# Patient Record
Sex: FEMALE | Race: WHITE | Employment: OTHER | URBAN - NONMETROPOLITAN AREA
[De-identification: names, ages, dates, MRNs, and addresses within clinical notes are randomized per-mention and may not be internally consistent; named-entity substitution may affect disease eponyms.]

---

## 2017-01-25 DIAGNOSIS — I10 ESSENTIAL HYPERTENSION: ICD-10-CM

## 2017-01-25 RX ORDER — METOPROLOL SUCCINATE 100 MG/1
TABLET, EXTENDED RELEASE ORAL
Qty: 90 TABLET | Refills: 3 | Status: ON HOLD | OUTPATIENT
Start: 2017-01-25 | End: 2019-05-28 | Stop reason: HOSPADM

## 2017-02-07 ENCOUNTER — TELEPHONE (OUTPATIENT)
Dept: NEUROLOGY | Age: 82
End: 2017-02-07

## 2017-02-09 ENCOUNTER — OFFICE VISIT (OUTPATIENT)
Dept: GASTROENTEROLOGY | Facility: CLINIC | Age: 82
End: 2017-02-09

## 2017-02-09 VITALS
DIASTOLIC BLOOD PRESSURE: 62 MMHG | OXYGEN SATURATION: 99 % | WEIGHT: 92.6 LBS | SYSTOLIC BLOOD PRESSURE: 122 MMHG | BODY MASS INDEX: 19.44 KG/M2 | HEART RATE: 84 BPM | HEIGHT: 58 IN | TEMPERATURE: 96.1 F

## 2017-02-09 DIAGNOSIS — K63.5 COLON POLYPS: ICD-10-CM

## 2017-02-09 DIAGNOSIS — D64.9 ANEMIA, UNSPECIFIED TYPE: Primary | ICD-10-CM

## 2017-02-09 PROCEDURE — 99212 OFFICE O/P EST SF 10 MIN: CPT | Performed by: NURSE PRACTITIONER

## 2017-02-09 RX ORDER — LEVOTHYROXINE SODIUM 0.1 MG/1
100 TABLET ORAL DAILY
COMMUNITY
Start: 2017-01-18

## 2017-02-09 RX ORDER — AMLODIPINE BESYLATE 10 MG/1
10 TABLET ORAL DAILY
COMMUNITY
Start: 2016-11-29

## 2017-02-09 RX ORDER — DONEPEZIL HYDROCHLORIDE 5 MG/1
5 TABLET, FILM COATED ORAL NIGHTLY
COMMUNITY
Start: 2017-01-29

## 2017-02-09 RX ORDER — METOPROLOL SUCCINATE 100 MG/1
100 TABLET, EXTENDED RELEASE ORAL DAILY
COMMUNITY
Start: 2017-01-25

## 2017-02-09 RX ORDER — CLOPIDOGREL BISULFATE 75 MG/1
75 TABLET ORAL DAILY
COMMUNITY
Start: 2016-11-17

## 2017-02-09 RX ORDER — LEVALBUTEROL TARTRATE 45 MCG
1 HFA AEROSOL WITH ADAPTER (GRAM) INHALATION EVERY 4 HOURS PRN
COMMUNITY
Start: 2017-01-25 | End: 2018-11-30

## 2017-02-09 RX ORDER — LOSARTAN POTASSIUM 50 MG/1
50 TABLET ORAL DAILY
COMMUNITY
Start: 2016-11-29

## 2017-02-09 RX ORDER — TIOTROPIUM BROMIDE 18 UG/1
1 CAPSULE ORAL; RESPIRATORY (INHALATION)
COMMUNITY
Start: 2016-11-17 | End: 2018-11-30

## 2017-02-09 RX ORDER — ATORVASTATIN CALCIUM 10 MG/1
10 TABLET, FILM COATED ORAL DAILY
COMMUNITY
Start: 2016-12-21

## 2017-02-09 NOTE — PROGRESS NOTES
Chief Complaint:   Chief Complaint   Patient presents with   • Anemia     Patients last colonoscopy was in 2012.  discussed colonoscopy with patients son as her memory had worsened 3 months and he was concerned that some of the cause could be blood loss.          Patient ID: Kate Carpenter is a 81 y.o. female     History of Present Illness:    This is a very pleasant 81-year-old female who was referred to our office by Dr. Holland with findings of anemia and memory difficulty.  After reviewing Dr. Sanchez notes and discussing with the patient's son the patient and have memory problems approximately 3 months ago.  She was seen in Dr. Sanchez's office on 12/ 5/16 and he did blood work up that revealed a globin of 8.71 red blood cells 3.5 and hematocrit 28.1 the patient was placed on iron and she is to have a follow-up with lab repeat within this month.  The patient's last colonoscopy was noted 10/16/12 with findings of diverticulosis.    Since son states that Dr. Sanchez felt that some of her memory problems could be due to being anemic.  He has referred her to our office for further evaluation.  I have discussed with the patient and her son for colonoscopy.  The patient states that she does not one to have one unless absolutely necessary and would like to wait at this time. The patient and her son both agree that her memory has gotten some better.    The patient denies any nausea, vomiting, epigastric pain or hematemesis.  She denies any bright red blood per rectum or black tarry stools.  She denies any diarrhea or constipation.  She denies any fever or chills.  She denies any unintentional weight loss or loss of appetite.    Past Medical History   Diagnosis Date   • Colon polyp    • Coronary artery disease    • CVD (cardiovascular disease)    • GERD (gastroesophageal reflux disease)    • MS (multiple sclerosis)    • Spastic colon        Past Surgical History   Procedure Laterality Date   • Thyroidectomy     •  "Colonoscopy  10/16/2012         Current Outpatient Prescriptions:   •  amLODIPine (NORVASC) 10 MG tablet, Take 10 mg by mouth Daily., Disp: , Rfl:   •  atorvastatin (LIPITOR) 10 MG tablet, Take 10 mg by mouth Daily., Disp: , Rfl:   •  clopidogrel (PLAVIX) 75 MG tablet, Take 75 mg by mouth Daily., Disp: , Rfl:   •  donepezil (ARICEPT) 5 MG tablet, Take 5 mg by mouth Every Night., Disp: , Rfl:   •  levothyroxine (SYNTHROID, LEVOTHROID) 100 MCG tablet, Take 100 mcg by mouth Daily., Disp: , Rfl:   •  losartan (COZAAR) 50 MG tablet, Take 50 mg by mouth Daily., Disp: , Rfl:   •  LYRICA 75 MG capsule, Take 75 mg by mouth 2 (Two) Times a Day., Disp: , Rfl:   •  metoprolol succinate XL (TOPROL-XL) 100 MG 24 hr tablet, Take 100 mg by mouth Daily., Disp: , Rfl:   •  NEXIUM 40 MG capsule, Take 40 mg by mouth Every Morning Before Breakfast., Disp: , Rfl:   •  SPIRIVA HANDIHALER 18 MCG per inhalation capsule, Place 1 capsule into inhaler and inhale Daily., Disp: , Rfl:   •  XOPENEX HFA 45 MCG/ACT inhaler, Inhale 1 puff Every 4 (Four) Hours As Needed., Disp: , Rfl:     No Known Allergies    Social History     Social History   • Marital status: Unknown     Spouse name: N/A   • Number of children: N/A   • Years of education: N/A     Occupational History   • Not on file.     Social History Main Topics   • Smoking status: Never Smoker   • Smokeless tobacco: Not on file   • Alcohol use No   • Drug use: Not on file   • Sexual activity: Not on file     Other Topics Concern   • Not on file     Social History Narrative       History reviewed. No pertinent family history.    Vitals:    02/09/17 1408   BP: 122/62   Pulse: 84   Temp: 96.1 °F (35.6 °C)   SpO2: 99%   Weight: 92 lb 9.6 oz (42 kg)   Height: 58\" (147.3 cm)       Review of Systems:    General:    Present -feeling well   Skin:    Not Present-Rash   HEENT:     Not Present-Acute visual changes or Acute hearing changes   Neck :    Not Present- swollen glands   Genitourinary:      Not " Present- burning, frequency, urgency hematuria, dysuria,   Cardiovascular:   Not Present-chest pain, palpitations, or pressure   Respiratory:   Not Present- shortness of breath or cough   Gastrointestinal:  Musculoskeletal:  Neurological:  Psychiatric:   Present as mentioned in the HP    Not Present. Recent gait disturbances.    Not Present-Seizures and weakness in extremities.    Not Present- Anxiety or Depression.       Physical Exam:    General Appearance:    Alert, cooperative, in no acute distress   Psych:    Mood appropriate    Eyes:          conjunctivae and sclerae normal, no   icterus, no pallor   ENMT:    Ears appear intact with no abnormalities noted oral mucosa moist   Neck:   No adenopathy, supple, trachea midline, no thyromegaly, no   carotid bruit, no JVD    Cardiovascular:    Regular rhythm and normal rate, normal S1 and S2, no            murmur, no gallop, no rub, no click   Gastrointestinal:     Inspection normal.  Normal bowel sounds, no masses, no organomegaly, soft round non-tender, non-distended, no guarding, no rebound or tenderness. No hepatosplenomegaly.   Skin:   No bleeding, bruising or rash   Neurologic:   nonfocal       No results found for: WBC, HGB, HCT, PLT     No results found for: NA, K, CL, CO2, BUN, CREATININE, BILITOT, ALKPHOS, ALT, AST, GLUCOSE    No results found for: INR    Assessment and Plan:    Kate was seen today for anemia.    Diagnoses and all orders for this visit:    Anemia, unspecified type    Colon polyps        There are no Patient Instructions on file for this visit.    Next follow-up appointment      The risks, benefits, and alternatives of colonoscopy were reviewed with the patient today.  Risks including perforation of the colon possibly requiring surgery or colostomy.  Additional risks include risk of bleeding from biopsies or removal of colon tissue.  There is also the risk of a drug reaction or problems with anesthesia.  This will be discussed with the  further by the anesthesia team on the day of the procedure.  Lastly there is a possibility of missing a colon polyp or cancer.  The benefits include the diagnosis and management of disease of the colon and rectum.  Alternatives to colonoscopy include barium enema, laboratory testing, radiographic evaluation, or no intervention.  At this time the patient states that she does not want to have a colonoscopy.      EMR Dragon/Transcription disclaimer:  Much of this encounter note is an electronic transcription/translation of spoken language to printed text. The electronic translation of spoken language may permit erroneous, or at times, nonsensical words or phrases to be inadvertently transcribed; although I have reviewed the note for such errors, some may still exist.

## 2017-03-13 ENCOUNTER — OFFICE VISIT (OUTPATIENT)
Dept: CARDIOLOGY | Age: 82
End: 2017-03-13
Payer: MEDICARE

## 2017-03-13 VITALS
WEIGHT: 91 LBS | DIASTOLIC BLOOD PRESSURE: 72 MMHG | SYSTOLIC BLOOD PRESSURE: 120 MMHG | BODY MASS INDEX: 15.16 KG/M2 | HEIGHT: 65 IN | HEART RATE: 76 BPM

## 2017-03-13 DIAGNOSIS — F17.200 TOBACCO USE DISORDER: ICD-10-CM

## 2017-03-13 DIAGNOSIS — I10 ESSENTIAL HYPERTENSION: ICD-10-CM

## 2017-03-13 DIAGNOSIS — I25.10 ASHD (ARTERIOSCLEROTIC HEART DISEASE): Primary | ICD-10-CM

## 2017-03-13 PROCEDURE — 1090F PRES/ABSN URINE INCON ASSESS: CPT | Performed by: INTERNAL MEDICINE

## 2017-03-13 PROCEDURE — G8484 FLU IMMUNIZE NO ADMIN: HCPCS | Performed by: INTERNAL MEDICINE

## 2017-03-13 PROCEDURE — G8400 PT W/DXA NO RESULTS DOC: HCPCS | Performed by: INTERNAL MEDICINE

## 2017-03-13 PROCEDURE — 4040F PNEUMOC VAC/ADMIN/RCVD: CPT | Performed by: INTERNAL MEDICINE

## 2017-03-13 PROCEDURE — G8419 CALC BMI OUT NRM PARAM NOF/U: HCPCS | Performed by: INTERNAL MEDICINE

## 2017-03-13 PROCEDURE — 99213 OFFICE O/P EST LOW 20 MIN: CPT | Performed by: INTERNAL MEDICINE

## 2017-03-13 PROCEDURE — 4004F PT TOBACCO SCREEN RCVD TLK: CPT | Performed by: INTERNAL MEDICINE

## 2017-03-13 PROCEDURE — G8427 DOCREV CUR MEDS BY ELIG CLIN: HCPCS | Performed by: INTERNAL MEDICINE

## 2017-03-13 PROCEDURE — G8598 ASA/ANTIPLAT THER USED: HCPCS | Performed by: INTERNAL MEDICINE

## 2017-03-13 PROCEDURE — 1123F ACP DISCUSS/DSCN MKR DOCD: CPT | Performed by: INTERNAL MEDICINE

## 2017-03-16 ENCOUNTER — OFFICE VISIT (OUTPATIENT)
Dept: NEUROLOGY | Age: 82
End: 2017-03-16
Payer: MEDICARE

## 2017-03-16 VITALS
DIASTOLIC BLOOD PRESSURE: 69 MMHG | SYSTOLIC BLOOD PRESSURE: 133 MMHG | RESPIRATION RATE: 16 BRPM | BODY MASS INDEX: 18.06 KG/M2 | WEIGHT: 92 LBS | HEIGHT: 60 IN | HEART RATE: 63 BPM

## 2017-03-16 DIAGNOSIS — G35 MULTIPLE SCLEROSIS (HCC): Primary | ICD-10-CM

## 2017-03-16 PROCEDURE — 4040F PNEUMOC VAC/ADMIN/RCVD: CPT | Performed by: PSYCHIATRY & NEUROLOGY

## 2017-03-16 PROCEDURE — 4004F PT TOBACCO SCREEN RCVD TLK: CPT | Performed by: PSYCHIATRY & NEUROLOGY

## 2017-03-16 PROCEDURE — 1090F PRES/ABSN URINE INCON ASSESS: CPT | Performed by: PSYCHIATRY & NEUROLOGY

## 2017-03-16 PROCEDURE — 99213 OFFICE O/P EST LOW 20 MIN: CPT | Performed by: PSYCHIATRY & NEUROLOGY

## 2017-03-16 PROCEDURE — G8419 CALC BMI OUT NRM PARAM NOF/U: HCPCS | Performed by: PSYCHIATRY & NEUROLOGY

## 2017-03-16 PROCEDURE — G8598 ASA/ANTIPLAT THER USED: HCPCS | Performed by: PSYCHIATRY & NEUROLOGY

## 2017-03-16 PROCEDURE — G8427 DOCREV CUR MEDS BY ELIG CLIN: HCPCS | Performed by: PSYCHIATRY & NEUROLOGY

## 2017-03-16 PROCEDURE — G8484 FLU IMMUNIZE NO ADMIN: HCPCS | Performed by: PSYCHIATRY & NEUROLOGY

## 2017-03-16 PROCEDURE — 1123F ACP DISCUSS/DSCN MKR DOCD: CPT | Performed by: PSYCHIATRY & NEUROLOGY

## 2017-03-16 PROCEDURE — G8400 PT W/DXA NO RESULTS DOC: HCPCS | Performed by: PSYCHIATRY & NEUROLOGY

## 2017-03-16 RX ORDER — LEVALBUTEROL TARTRATE 45 UG/1
1-2 AEROSOL, METERED ORAL EVERY 4 HOURS PRN
Status: ON HOLD | COMMUNITY
End: 2019-08-26 | Stop reason: HOSPADM

## 2017-03-16 RX ORDER — FEXOFENADINE HCL 180 MG/1
180 TABLET ORAL DAILY
COMMUNITY
End: 2017-09-13 | Stop reason: ALTCHOICE

## 2017-03-16 RX ORDER — DONEPEZIL HYDROCHLORIDE 5 MG/1
5 TABLET, FILM COATED ORAL NIGHTLY
COMMUNITY
End: 2018-04-05 | Stop reason: DRUGHIGH

## 2017-03-23 ENCOUNTER — TELEPHONE (OUTPATIENT)
Dept: NEUROLOGY | Age: 82
End: 2017-03-23

## 2017-04-04 ENCOUNTER — HOSPITAL ENCOUNTER (OUTPATIENT)
Dept: MRI IMAGING | Age: 82
Discharge: HOME | End: 2017-04-04
Payer: MEDICARE

## 2017-04-04 DIAGNOSIS — G35 MULTIPLE SCLEROSIS (HCC): ICD-10-CM

## 2017-04-04 PROCEDURE — 70553 MRI BRAIN STEM W/O & W/DYE: CPT

## 2017-04-04 PROCEDURE — 6360000004 HC RX CONTRAST MEDICATION: Performed by: PSYCHIATRY & NEUROLOGY

## 2017-04-04 PROCEDURE — A9579 GAD-BASE MR CONTRAST NOS,1ML: HCPCS | Performed by: PSYCHIATRY & NEUROLOGY

## 2017-04-04 RX ADMIN — GADOPENTETATE DIMEGLUMINE 10 ML: 469.01 INJECTION INTRAVENOUS at 12:44

## 2017-04-12 ENCOUNTER — TELEPHONE (OUTPATIENT)
Dept: NEUROLOGY | Age: 82
End: 2017-04-12

## 2017-04-13 ENCOUNTER — TELEPHONE (OUTPATIENT)
Dept: NEUROLOGY | Age: 82
End: 2017-04-13

## 2017-05-26 DIAGNOSIS — I10 ESSENTIAL HYPERTENSION: ICD-10-CM

## 2017-05-26 RX ORDER — LOSARTAN POTASSIUM 50 MG/1
TABLET ORAL
Qty: 90 TABLET | Refills: 1 | Status: ON HOLD | OUTPATIENT
Start: 2017-05-26 | End: 2019-08-17

## 2017-05-26 RX ORDER — AMLODIPINE BESYLATE 10 MG/1
TABLET ORAL
Qty: 90 TABLET | Refills: 1 | Status: ON HOLD | OUTPATIENT
Start: 2017-05-26 | End: 2019-05-28 | Stop reason: SDUPTHER

## 2017-09-13 ENCOUNTER — OFFICE VISIT (OUTPATIENT)
Dept: CARDIOLOGY | Age: 82
End: 2017-09-13
Payer: MEDICARE

## 2017-09-13 VITALS
WEIGHT: 98 LBS | HEART RATE: 56 BPM | DIASTOLIC BLOOD PRESSURE: 80 MMHG | HEIGHT: 64 IN | SYSTOLIC BLOOD PRESSURE: 146 MMHG | BODY MASS INDEX: 16.73 KG/M2

## 2017-09-13 DIAGNOSIS — Z95.5 HISTORY OF CORONARY ARTERY STENT PLACEMENT: ICD-10-CM

## 2017-09-13 DIAGNOSIS — I25.10 CORONARY ARTERY DISEASE INVOLVING NATIVE CORONARY ARTERY OF NATIVE HEART WITHOUT ANGINA PECTORIS: Primary | ICD-10-CM

## 2017-09-13 DIAGNOSIS — I10 ESSENTIAL HYPERTENSION: ICD-10-CM

## 2017-09-13 DIAGNOSIS — E78.2 MIXED HYPERLIPIDEMIA: ICD-10-CM

## 2017-09-13 PROCEDURE — G8418 CALC BMI BLW LOW PARAM F/U: HCPCS | Performed by: NURSE PRACTITIONER

## 2017-09-13 PROCEDURE — G8427 DOCREV CUR MEDS BY ELIG CLIN: HCPCS | Performed by: NURSE PRACTITIONER

## 2017-09-13 PROCEDURE — 93000 ELECTROCARDIOGRAM COMPLETE: CPT | Performed by: NURSE PRACTITIONER

## 2017-09-13 PROCEDURE — 1123F ACP DISCUSS/DSCN MKR DOCD: CPT | Performed by: NURSE PRACTITIONER

## 2017-09-13 PROCEDURE — 1090F PRES/ABSN URINE INCON ASSESS: CPT | Performed by: NURSE PRACTITIONER

## 2017-09-13 PROCEDURE — 4004F PT TOBACCO SCREEN RCVD TLK: CPT | Performed by: NURSE PRACTITIONER

## 2017-09-13 PROCEDURE — G8598 ASA/ANTIPLAT THER USED: HCPCS | Performed by: NURSE PRACTITIONER

## 2017-09-13 PROCEDURE — 4040F PNEUMOC VAC/ADMIN/RCVD: CPT | Performed by: NURSE PRACTITIONER

## 2017-09-13 PROCEDURE — 99213 OFFICE O/P EST LOW 20 MIN: CPT | Performed by: NURSE PRACTITIONER

## 2017-09-13 PROCEDURE — G8400 PT W/DXA NO RESULTS DOC: HCPCS | Performed by: NURSE PRACTITIONER

## 2017-09-21 ENCOUNTER — OFFICE VISIT (OUTPATIENT)
Dept: NEUROLOGY | Age: 82
End: 2017-09-21
Payer: MEDICARE

## 2017-09-21 VITALS
BODY MASS INDEX: 19.24 KG/M2 | HEIGHT: 60 IN | HEART RATE: 58 BPM | DIASTOLIC BLOOD PRESSURE: 55 MMHG | WEIGHT: 98 LBS | SYSTOLIC BLOOD PRESSURE: 114 MMHG | OXYGEN SATURATION: 96 %

## 2017-09-21 DIAGNOSIS — G35 MULTIPLE SCLEROSIS (HCC): Primary | ICD-10-CM

## 2017-09-21 PROCEDURE — G8420 CALC BMI NORM PARAMETERS: HCPCS | Performed by: PSYCHIATRY & NEUROLOGY

## 2017-09-21 PROCEDURE — G8598 ASA/ANTIPLAT THER USED: HCPCS | Performed by: PSYCHIATRY & NEUROLOGY

## 2017-09-21 PROCEDURE — G8427 DOCREV CUR MEDS BY ELIG CLIN: HCPCS | Performed by: PSYCHIATRY & NEUROLOGY

## 2017-09-21 PROCEDURE — G8400 PT W/DXA NO RESULTS DOC: HCPCS | Performed by: PSYCHIATRY & NEUROLOGY

## 2017-09-21 PROCEDURE — 4040F PNEUMOC VAC/ADMIN/RCVD: CPT | Performed by: PSYCHIATRY & NEUROLOGY

## 2017-09-21 PROCEDURE — 4004F PT TOBACCO SCREEN RCVD TLK: CPT | Performed by: PSYCHIATRY & NEUROLOGY

## 2017-09-21 PROCEDURE — 1123F ACP DISCUSS/DSCN MKR DOCD: CPT | Performed by: PSYCHIATRY & NEUROLOGY

## 2017-09-21 PROCEDURE — 99213 OFFICE O/P EST LOW 20 MIN: CPT | Performed by: PSYCHIATRY & NEUROLOGY

## 2017-09-21 PROCEDURE — 1090F PRES/ABSN URINE INCON ASSESS: CPT | Performed by: PSYCHIATRY & NEUROLOGY

## 2017-09-21 RX ORDER — IBUPROFEN 200 MG
200 TABLET ORAL PRN
COMMUNITY
End: 2018-04-05 | Stop reason: DRUGHIGH

## 2017-09-21 NOTE — PROGRESS NOTES
[facility] Neurology Follow Up Encounter  2017 3:46 PM    Information:   Patient Name: Michaelle White  :   1935  Age:   80 y.o. MRN:   [id number]  Account #:  [de-identified]  Today:  17    Provider: Kimmy Coffey M.D. Chief Complaint:   Chief Complaint   Patient presents with    Multiple Sclerosis     Pt is having swelling in her feet        Subjective:   Michaelle White is a 80 y.o. woman with a history of multiple scerosis who is following up. She feels she is gradually worsening. She has some balance issues and ambulates with a cane. She has had no falls. She tolerates the [place]. She has had no stroke symptoms. She feels generally weak. Her balance seems worse. She has had no outright weakness or numbness. Her eyesight is good. She denies bladder symptoms. She is fatigued. She sleeps well and denies drowsiness. She has been clinically stable. Objective:     Past Medical History:  Past Medical History:   Diagnosis Date    AAA (abdominal aortic aneurysm) without rupture ([address] 3/25/2013    Arteriosclerotic heart disease     Coronary artery disease with LAD x2 drug-eluting stents.      Arthritis     Atherosclerosis of native arteries of the extremities with intermittent claudication     Atherosclerosis of native arteries of the extremities with intermittent claudication     CAD (coronary artery disease) 2009    Carotid artery occlusion 2009    Cerebrovascular disease     Heart attack ([address] 2015    History of blood transfusion     Hyperlipidemia 2009    Cholesterol management per Burgess Antonio BEDOLLA   Ángela March Hypertension 2009    Hypothyroidism     Multiple sclerosis ([address]     Seizure disorder ([address]     Possible    Thyroid disease 2009    Tobacco abuse, continuous 2009       Past Surgical History:   Procedure Laterality Date    ABDOMINAL AORTIC ANEURYSM REPAIR  TJR/09    5.2 CM AAA; 14MM INTERPOSITIONAL DACRON GRAFT    APPENDECTOMY      CARDIAC CATHETERIZATION PO) Take by mouth      losartan (COZAAR) 50 MG tablet TAKE 1 TABLET DAILY 90 tablet 1    amLODIPine (NORVASC) 10 MG tablet TAKE 1 TABLET DAILY 90 tablet 1    levalbuterol (XOPENEX HFA) 45 MCG/ACT inhaler Inhale 1-2 puffs into the lungs every 4 hours as needed for Wheezing      tiotropium (SPIRIVA) 18 MCG inhalation capsule Inhale 18 mcg into the lungs daily      donepezil (ARICEPT) 5 MG tablet Take 5 mg by mouth nightly      metoprolol succinate (TOPROL XL) 100 MG extended release tablet TAKE 1 TABLET DAILY 90 tablet 3    clopidogrel (PLAVIX) 75 MG tablet Take 1 tablet by mouth daily Indications: Cadmium Poisoning 30 tablet 5    furosemide (LASIX) 20 MG tablet Take 1 tablet by mouth daily Indications: Edema 30 tablet 5    nitroGLYCERIN (NITROSTAT) 0.4 MG SL tablet Place 1 tablet under the tongue every 5 minutes as needed for Chest pain. 25 tablet 3    esomeprazole (NEXIUM) 40 MG capsule Take 40 mg by mouth every morning (before breakfast).  atorvastatin (LIPITOR) 10 MG tablet Take 10 mg by mouth daily.  Levothyroxine Sodium (SYNTHROID PO) Take 0.1 mcg by mouth.  pregabalin (LYRICA) 75 MG capsule Take 75 mg by mouth nightly. No current facility-administered medications for this visit. Allergies: Allergies as of 09/21/2017 - Review Complete 09/21/2017   Allergen Reaction Noted    Morphine  05/04/2011       Examination:  Vitals:  BP (!) 114/55  Pulse 58  Ht 5' (1.524 m)  Wt 98 lb (44.5 kg)  SpO2 96%  BMI 19.14 kg/m2  General appearance: alert and cooperative with exam  HEENT: Sclera clear, anicteric, Oropharynx clear, no lesions, Neck supple with midline trachea, Thyroid without masses and Trachea midline  Heart[de-identified] RRR with systolic murmer that radiates to the left greater than right carotid. Lungs: clear to auscultation bilaterally  Extremities: extremities normal, atraumatic, no cyanosis or edema  Neurologic: Extraocular movements are intact without nystagmus.  Visual

## 2017-09-21 NOTE — MR AVS SNAPSHOT
After Visit Summary             Papa Bryan   2017 2:45 PM   Office Visit    Description:  Female : 1935   Provider:  Marianne Roa MD   Department:  [facility] Neuro & Sleep              Your Follow-Up and Future Appointments         Below is a list of your follow-up and future appointments. This may not be a complete list as you may have made appointments directly with providers that we are not aware of or your providers may have made some for you. Please call your providers to confirm appointments. It is important to keep your appointments. Please bring your current insurance card, photo ID, co-pay, and all medication bottles to your appointment. If self-pay, payment is expected at the time of service. Your To-Do List     Future Appointments Provider Department Dept Phone    3/19/2018 9:45 AM PARVEZ Barillas MD [facility] [phone]    Please arrive 15 minutes prior to appointment time, bring insurance card and photo ID.     3/26/2018 10:45 AM Marianne Roa MD [facility] [phone]    Please arrive 15 minutes prior to appointment, bring photo ID and insurance card. Follow-Up    Return in about 6 months (around 3/21/2018). Information from Your Visit        Department     Name Address Phone Fax    [address] [place] [address]  [address] [phone]      You Were Seen for:         Comments    Multiple sclerosis ([address]   [340. ICD-9-CM]         Vital Signs     Blood Pressure Pulse Height Weight Oxygen Saturation Body Mass Index    114/55 58 5' (1.524 m) 98 lb (44.5 kg) 96% 19.14 kg/m2    Smoking Status                   Current Every Day Smoker              Medications and Orders      Your Current Medications Are              ibuprofen (ADVIL;MOTRIN) 200 MG tablet Take 200 mg by mouth as needed for Pain    IRON PO Take by mouth CAD (coronary artery disease)    Hyperlipidemia    Hypertension    Hypothyroidism    Atherosclerosis of native artery of extremity with intermittent claudication [facility])      Preventive Care        Date Due    Tetanus Combination Vaccine (1 - Tdap) 4/30/1954    Zoster Vaccine 4/30/1995    Osteoporosis screening or a bone density scan (Dexa) is recommended once at age 72. Based upon the results and risk factors for bone loss, your provider will recommend whether this needs to be repeated. 4/30/2000    Pneumococcal Vaccines (two) for all adults aged 72 and over (1 of 2 - PCV13) 4/30/2000    Yearly Flu Vaccine (1) 9/1/2017            "[employer]hart Signup           Our records indicate that you have an active Ad[employer] account. You can view your After Visit Summary by going to [email or web address] and logging in with your Adyen username and password. If you don't have a Adyen username and password but a parent or guardian has access to your record, the parent or guardian should login with their own Adyen username and password and access your record to view the After Visit Summary. Additional Information  If you have questions, please contact the physician practice where you receive care. Remember, [employer]en is NOT to be used for urgent needs. For medical emergencies, dial 911. For questions regarding your Adyen account call [phone]. If you have a clinical question, please call your doctor's office.

## 2017-11-09 ENCOUNTER — TELEPHONE (OUTPATIENT)
Dept: GASTROENTEROLOGY | Facility: CLINIC | Age: 82
End: 2017-11-09

## 2017-11-09 NOTE — TELEPHONE ENCOUNTER
SPOKE WITH PT'S SON ABOUT PT BEING DUE FOR A REPEAT SCOPE. HE SAID THAT HER PCP, DR MCDANIEL, DOES NOT RECOMMEND THAT SHE HAVE A SCOPE AND THE PT DOES NOT WISH TO HAVE ONE. HE WILL TALK TO HIS MOTHER AND IF THINGS SHOULD CHANGE THEY WILL CALL TO MAKE AN APPT.

## 2018-03-26 ENCOUNTER — TELEPHONE (OUTPATIENT)
Dept: NEUROLOGY | Age: 83
End: 2018-03-26

## 2018-04-05 ENCOUNTER — OFFICE VISIT (OUTPATIENT)
Dept: CARDIOLOGY | Age: 83
End: 2018-04-05
Payer: MEDICARE

## 2018-04-05 VITALS
BODY MASS INDEX: 21.01 KG/M2 | HEART RATE: 56 BPM | DIASTOLIC BLOOD PRESSURE: 58 MMHG | SYSTOLIC BLOOD PRESSURE: 122 MMHG | WEIGHT: 107 LBS | HEIGHT: 60 IN

## 2018-04-05 DIAGNOSIS — R09.89 RIGHT CAROTID BRUIT: Primary | ICD-10-CM

## 2018-04-05 DIAGNOSIS — Z95.5 HISTORY OF CORONARY ARTERY STENT PLACEMENT: ICD-10-CM

## 2018-04-05 DIAGNOSIS — I25.10 CORONARY ARTERY DISEASE INVOLVING NATIVE CORONARY ARTERY OF NATIVE HEART WITHOUT ANGINA PECTORIS: ICD-10-CM

## 2018-04-05 DIAGNOSIS — E78.2 MIXED HYPERLIPIDEMIA: ICD-10-CM

## 2018-04-05 DIAGNOSIS — I10 ESSENTIAL HYPERTENSION: ICD-10-CM

## 2018-04-05 PROCEDURE — G8420 CALC BMI NORM PARAMETERS: HCPCS | Performed by: NURSE PRACTITIONER

## 2018-04-05 PROCEDURE — 4004F PT TOBACCO SCREEN RCVD TLK: CPT | Performed by: NURSE PRACTITIONER

## 2018-04-05 PROCEDURE — G8400 PT W/DXA NO RESULTS DOC: HCPCS | Performed by: NURSE PRACTITIONER

## 2018-04-05 PROCEDURE — 1090F PRES/ABSN URINE INCON ASSESS: CPT | Performed by: NURSE PRACTITIONER

## 2018-04-05 PROCEDURE — 99213 OFFICE O/P EST LOW 20 MIN: CPT | Performed by: NURSE PRACTITIONER

## 2018-04-05 PROCEDURE — G8427 DOCREV CUR MEDS BY ELIG CLIN: HCPCS | Performed by: NURSE PRACTITIONER

## 2018-04-05 PROCEDURE — 1123F ACP DISCUSS/DSCN MKR DOCD: CPT | Performed by: NURSE PRACTITIONER

## 2018-04-05 PROCEDURE — G8599 NO ASA/ANTIPLAT THER USE RNG: HCPCS | Performed by: NURSE PRACTITIONER

## 2018-04-05 PROCEDURE — 4040F PNEUMOC VAC/ADMIN/RCVD: CPT | Performed by: NURSE PRACTITIONER

## 2018-04-05 RX ORDER — DONEPEZIL HYDROCHLORIDE 10 MG/1
10 TABLET, ORALLY DISINTEGRATING ORAL NIGHTLY
COMMUNITY

## 2018-04-05 RX ORDER — IBUPROFEN 600 MG/1
1200 TABLET ORAL 2 TIMES DAILY
Status: ON HOLD | COMMUNITY
End: 2018-06-25

## 2018-04-05 RX ORDER — FUROSEMIDE 40 MG/1
40 TABLET ORAL DAILY
COMMUNITY

## 2018-04-11 ENCOUNTER — HOSPITAL ENCOUNTER (OUTPATIENT)
Dept: VASCULAR LAB | Age: 83
Discharge: HOME | End: 2018-04-11
Payer: MEDICARE

## 2018-04-11 DIAGNOSIS — R09.89 RIGHT CAROTID BRUIT: ICD-10-CM

## 2018-04-11 PROCEDURE — 93880 EXTRACRANIAL BILAT STUDY: CPT

## 2018-05-14 ENCOUNTER — OFFICE VISIT (OUTPATIENT)
Dept: NEUROLOGY | Age: 83
End: 2018-05-14
Payer: MEDICARE

## 2018-05-14 VITALS
HEART RATE: 52 BPM | WEIGHT: 107 LBS | HEIGHT: 59 IN | BODY MASS INDEX: 21.57 KG/M2 | DIASTOLIC BLOOD PRESSURE: 54 MMHG | SYSTOLIC BLOOD PRESSURE: 103 MMHG

## 2018-05-14 DIAGNOSIS — G35 MULTIPLE SCLEROSIS (HCC): Primary | ICD-10-CM

## 2018-05-14 PROCEDURE — 1123F ACP DISCUSS/DSCN MKR DOCD: CPT | Performed by: PSYCHIATRY & NEUROLOGY

## 2018-05-14 PROCEDURE — G8599 NO ASA/ANTIPLAT THER USE RNG: HCPCS | Performed by: PSYCHIATRY & NEUROLOGY

## 2018-05-14 PROCEDURE — 1090F PRES/ABSN URINE INCON ASSESS: CPT | Performed by: PSYCHIATRY & NEUROLOGY

## 2018-05-14 PROCEDURE — G8400 PT W/DXA NO RESULTS DOC: HCPCS | Performed by: PSYCHIATRY & NEUROLOGY

## 2018-05-14 PROCEDURE — 4040F PNEUMOC VAC/ADMIN/RCVD: CPT | Performed by: PSYCHIATRY & NEUROLOGY

## 2018-05-14 PROCEDURE — 99213 OFFICE O/P EST LOW 20 MIN: CPT | Performed by: PSYCHIATRY & NEUROLOGY

## 2018-05-14 PROCEDURE — 4004F PT TOBACCO SCREEN RCVD TLK: CPT | Performed by: PSYCHIATRY & NEUROLOGY

## 2018-05-14 PROCEDURE — G8427 DOCREV CUR MEDS BY ELIG CLIN: HCPCS | Performed by: PSYCHIATRY & NEUROLOGY

## 2018-05-14 PROCEDURE — G8420 CALC BMI NORM PARAMETERS: HCPCS | Performed by: PSYCHIATRY & NEUROLOGY

## 2018-06-22 ENCOUNTER — HOSPITAL ENCOUNTER (INPATIENT)
Age: 83
LOS: 2 days | Discharge: HOME HEALTH | DRG: 690 | End: 2018-06-25
Attending: EMERGENCY MEDICINE | Admitting: FAMILY MEDICINE
Payer: MEDICARE

## 2018-06-22 ENCOUNTER — APPOINTMENT (OUTPATIENT)
Dept: CT IMAGING | Age: 83
DRG: 690 | End: 2018-06-22
Payer: MEDICARE

## 2018-06-22 ENCOUNTER — APPOINTMENT (OUTPATIENT)
Dept: GENERAL RADIOLOGY | Age: 83
DRG: 690 | End: 2018-06-22
Payer: MEDICARE

## 2018-06-22 DIAGNOSIS — R53.83 FATIGUE, UNSPECIFIED TYPE: ICD-10-CM

## 2018-06-22 DIAGNOSIS — R25.1 TREMOR: ICD-10-CM

## 2018-06-22 DIAGNOSIS — R53.1 GENERAL WEAKNESS: ICD-10-CM

## 2018-06-22 DIAGNOSIS — N30.01 ACUTE CYSTITIS WITH HEMATURIA: Primary | ICD-10-CM

## 2018-06-22 PROBLEM — N39.0 UTI (URINARY TRACT INFECTION): Status: ACTIVE | Noted: 2018-06-22

## 2018-06-22 LAB
ALBUMIN SERPL-MCNC: 3.3 G/DL (ref 3.5–5.2)
ALP BLD-CCNC: 76 U/L (ref 35–104)
ALT SERPL-CCNC: 8 U/L (ref 5–33)
ANION GAP SERPL CALCULATED.3IONS-SCNC: 13 MMOL/L (ref 7–19)
APTT: 30.4 SEC (ref 26–36.2)
AST SERPL-CCNC: 14 U/L (ref 5–32)
BACTERIA: ABNORMAL /HPF
BASOPHILS ABSOLUTE: 0.1 K/UL (ref 0–0.2)
BASOPHILS RELATIVE PERCENT: 0.3 % (ref 0–1)
BILIRUB SERPL-MCNC: 0.3 MG/DL (ref 0.2–1.2)
BILIRUBIN URINE: NEGATIVE
BLOOD, URINE: ABNORMAL
BUN BLDV-MCNC: 24 MG/DL (ref 8–23)
CALCIUM SERPL-MCNC: 8.6 MG/DL (ref 8.8–10.2)
CHLORIDE BLD-SCNC: 97 MMOL/L (ref 98–111)
CLARITY: ABNORMAL
CO2: 26 MMOL/L (ref 22–29)
COLOR: YELLOW
CREAT SERPL-MCNC: 1.1 MG/DL (ref 0.5–0.9)
EOSINOPHILS ABSOLUTE: 0 K/UL (ref 0–0.6)
EOSINOPHILS RELATIVE PERCENT: 0 % (ref 0–5)
EPITHELIAL CELLS, UA: 0 /HPF (ref 0–5)
GFR NON-AFRICAN AMERICAN: 47
GLUCOSE BLD-MCNC: 138 MG/DL (ref 74–109)
GLUCOSE URINE: NEGATIVE MG/DL
HCT VFR BLD CALC: 35.1 % (ref 37–47)
HEMOGLOBIN: 11.5 G/DL (ref 12–16)
HYALINE CASTS: 2 /HPF (ref 0–8)
INR BLD: 1.27 (ref 0.88–1.18)
KETONES, URINE: NEGATIVE MG/DL
LEUKOCYTE ESTERASE, URINE: ABNORMAL
LYMPHOCYTES ABSOLUTE: 0.9 K/UL (ref 1.1–4.5)
LYMPHOCYTES RELATIVE PERCENT: 4.3 % (ref 20–40)
MCH RBC QN AUTO: 29.9 PG (ref 27–31)
MCHC RBC AUTO-ENTMCNC: 32.8 G/DL (ref 33–37)
MCV RBC AUTO: 91.2 FL (ref 81–99)
MONOCYTES ABSOLUTE: 1.5 K/UL (ref 0–0.9)
MONOCYTES RELATIVE PERCENT: 7.2 % (ref 0–10)
NEUTROPHILS ABSOLUTE: 18.4 K/UL (ref 1.5–7.5)
NEUTROPHILS RELATIVE PERCENT: 86.1 % (ref 50–65)
NITRITE, URINE: NEGATIVE
PDW BLD-RTO: 15.2 % (ref 11.5–14.5)
PH UA: 6
PLATELET # BLD: 184 K/UL (ref 130–400)
PMV BLD AUTO: 10.6 FL (ref 9.4–12.3)
POTASSIUM SERPL-SCNC: 4.2 MMOL/L (ref 3.5–5)
PROTEIN UA: 30 MG/DL
PROTHROMBIN TIME: 15.8 SEC (ref 12–14.6)
RBC # BLD: 3.85 M/UL (ref 4.2–5.4)
RBC UA: 5 /HPF (ref 0–4)
SODIUM BLD-SCNC: 136 MMOL/L (ref 136–145)
SPECIFIC GRAVITY UA: 1.01
TOTAL PROTEIN: 6.5 G/DL (ref 6.6–8.7)
TROPONIN: <0.01 NG/ML (ref 0–0.03)
URINE REFLEX TO CULTURE: YES
UROBILINOGEN, URINE: 0.2 E.U./DL
WBC # BLD: 21.3 K/UL (ref 4.8–10.8)
WBC UA: 298 /HPF (ref 0–5)

## 2018-06-22 PROCEDURE — 96375 TX/PRO/DX INJ NEW DRUG ADDON: CPT

## 2018-06-22 PROCEDURE — 96374 THER/PROPH/DIAG INJ IV PUSH: CPT

## 2018-06-22 PROCEDURE — 93005 ELECTROCARDIOGRAM TRACING: CPT

## 2018-06-22 PROCEDURE — G0378 HOSPITAL OBSERVATION PER HR: HCPCS

## 2018-06-22 PROCEDURE — 81001 URINALYSIS AUTO W/SCOPE: CPT

## 2018-06-22 PROCEDURE — 2580000003 HC RX 258: Performed by: EMERGENCY MEDICINE

## 2018-06-22 PROCEDURE — 99285 EMERGENCY DEPT VISIT HI MDM: CPT | Performed by: EMERGENCY MEDICINE

## 2018-06-22 PROCEDURE — 71045 X-RAY EXAM CHEST 1 VIEW: CPT

## 2018-06-22 PROCEDURE — 87040 BLOOD CULTURE FOR BACTERIA: CPT

## 2018-06-22 PROCEDURE — 84484 ASSAY OF TROPONIN QUANT: CPT

## 2018-06-22 PROCEDURE — 99285 EMERGENCY DEPT VISIT HI MDM: CPT

## 2018-06-22 PROCEDURE — 87186 SC STD MICRODIL/AGAR DIL: CPT

## 2018-06-22 PROCEDURE — 80053 COMPREHEN METABOLIC PANEL: CPT

## 2018-06-22 PROCEDURE — 85025 COMPLETE CBC W/AUTO DIFF WBC: CPT

## 2018-06-22 PROCEDURE — 85730 THROMBOPLASTIN TIME PARTIAL: CPT

## 2018-06-22 PROCEDURE — 70450 CT HEAD/BRAIN W/O DYE: CPT

## 2018-06-22 PROCEDURE — 6360000002 HC RX W HCPCS: Performed by: EMERGENCY MEDICINE

## 2018-06-22 PROCEDURE — 85610 PROTHROMBIN TIME: CPT

## 2018-06-22 PROCEDURE — 36415 COLL VENOUS BLD VENIPUNCTURE: CPT

## 2018-06-22 PROCEDURE — 87086 URINE CULTURE/COLONY COUNT: CPT

## 2018-06-22 RX ORDER — ACETAMINOPHEN 325 MG/1
650 TABLET ORAL EVERY 4 HOURS PRN
Status: DISCONTINUED | OUTPATIENT
Start: 2018-06-22 | End: 2018-06-25 | Stop reason: HOSPADM

## 2018-06-22 RX ORDER — 0.9 % SODIUM CHLORIDE 0.9 %
1000 INTRAVENOUS SOLUTION INTRAVENOUS ONCE
Status: DISCONTINUED | OUTPATIENT
Start: 2018-06-22 | End: 2018-06-22

## 2018-06-22 RX ORDER — FENTANYL CITRATE 50 UG/ML
25 INJECTION, SOLUTION INTRAMUSCULAR; INTRAVENOUS
Status: DISCONTINUED | OUTPATIENT
Start: 2018-06-22 | End: 2018-06-25 | Stop reason: HOSPADM

## 2018-06-22 RX ORDER — SODIUM CHLORIDE 9 MG/ML
INJECTION, SOLUTION INTRAVENOUS ONCE
Status: COMPLETED | OUTPATIENT
Start: 2018-06-22 | End: 2018-06-22

## 2018-06-22 RX ORDER — SODIUM CHLORIDE 0.9 % (FLUSH) 0.9 %
10 SYRINGE (ML) INJECTION EVERY 12 HOURS SCHEDULED
Status: DISCONTINUED | OUTPATIENT
Start: 2018-06-22 | End: 2018-06-25 | Stop reason: HOSPADM

## 2018-06-22 RX ORDER — SODIUM CHLORIDE 0.9 % (FLUSH) 0.9 %
10 SYRINGE (ML) INJECTION PRN
Status: DISCONTINUED | OUTPATIENT
Start: 2018-06-22 | End: 2018-06-25 | Stop reason: HOSPADM

## 2018-06-22 RX ORDER — LORAZEPAM 2 MG/ML
1 INJECTION INTRAMUSCULAR ONCE
Status: COMPLETED | OUTPATIENT
Start: 2018-06-22 | End: 2018-06-22

## 2018-06-22 RX ORDER — 0.9 % SODIUM CHLORIDE 0.9 %
1000 INTRAVENOUS SOLUTION INTRAVENOUS ONCE
Status: COMPLETED | OUTPATIENT
Start: 2018-06-22 | End: 2018-06-22

## 2018-06-22 RX ORDER — CEFUROXIME AXETIL 250 MG/1
500 TABLET ORAL ONCE
Status: DISCONTINUED | OUTPATIENT
Start: 2018-06-22 | End: 2018-06-22

## 2018-06-22 RX ORDER — METHYLPREDNISOLONE SODIUM SUCCINATE 125 MG/2ML
125 INJECTION, POWDER, LYOPHILIZED, FOR SOLUTION INTRAMUSCULAR; INTRAVENOUS ONCE
Status: COMPLETED | OUTPATIENT
Start: 2018-06-22 | End: 2018-06-22

## 2018-06-22 RX ADMIN — LORAZEPAM 1 MG: 2 INJECTION INTRAMUSCULAR; INTRAVENOUS at 18:41

## 2018-06-22 RX ADMIN — METHYLPREDNISOLONE SODIUM SUCCINATE 125 MG: 125 INJECTION, POWDER, FOR SOLUTION INTRAMUSCULAR; INTRAVENOUS at 18:41

## 2018-06-22 RX ADMIN — WATER 1 G: 1 INJECTION INTRAMUSCULAR; INTRAVENOUS; SUBCUTANEOUS at 20:35

## 2018-06-22 RX ADMIN — SODIUM CHLORIDE 999 ML/HR: 9 INJECTION, SOLUTION INTRAVENOUS at 20:37

## 2018-06-22 RX ADMIN — SODIUM CHLORIDE 1000 ML: 9 INJECTION, SOLUTION INTRAVENOUS at 18:40

## 2018-06-22 ASSESSMENT — ENCOUNTER SYMPTOMS
ABDOMINAL PAIN: 0
PHOTOPHOBIA: 0
RHINORRHEA: 0
VOMITING: 0
NAUSEA: 0
BACK PAIN: 0
SHORTNESS OF BREATH: 0
SORE THROAT: 0
COUGH: 0
CHEST TIGHTNESS: 0
DIARRHEA: 0
EYE PAIN: 0
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
## 2018-06-23 PROBLEM — R41.82 ALTERED MENTAL STATE: Status: ACTIVE | Noted: 2018-06-23

## 2018-06-23 PROBLEM — R25.1 TREMOR: Status: ACTIVE | Noted: 2018-06-23

## 2018-06-23 PROCEDURE — 2580000003 HC RX 258: Performed by: FAMILY MEDICINE

## 2018-06-23 PROCEDURE — 6360000002 HC RX W HCPCS: Performed by: FAMILY MEDICINE

## 2018-06-23 PROCEDURE — 99223 1ST HOSP IP/OBS HIGH 75: CPT | Performed by: PSYCHIATRY & NEUROLOGY

## 2018-06-23 PROCEDURE — 1210000000 HC MED SURG R&B

## 2018-06-23 PROCEDURE — 6370000000 HC RX 637 (ALT 250 FOR IP): Performed by: FAMILY MEDICINE

## 2018-06-23 RX ORDER — SODIUM CHLORIDE 9 MG/ML
INJECTION, SOLUTION INTRAVENOUS CONTINUOUS
Status: DISCONTINUED | OUTPATIENT
Start: 2018-06-23 | End: 2018-06-25 | Stop reason: HOSPADM

## 2018-06-23 RX ORDER — LEVALBUTEROL TARTRATE 45 UG/1
2 AEROSOL, METERED ORAL EVERY 4 HOURS PRN
Status: DISCONTINUED | OUTPATIENT
Start: 2018-06-23 | End: 2018-06-25 | Stop reason: HOSPADM

## 2018-06-23 RX ORDER — METOPROLOL SUCCINATE 50 MG/1
100 TABLET, EXTENDED RELEASE ORAL DAILY
Status: DISCONTINUED | OUTPATIENT
Start: 2018-06-23 | End: 2018-06-25 | Stop reason: HOSPADM

## 2018-06-23 RX ORDER — LEVOTHYROXINE SODIUM 0.1 MG/1
100 TABLET ORAL DAILY
Status: DISCONTINUED | OUTPATIENT
Start: 2018-06-23 | End: 2018-06-25 | Stop reason: HOSPADM

## 2018-06-23 RX ORDER — PREGABALIN 75 MG/1
75 CAPSULE ORAL NIGHTLY
Status: DISCONTINUED | OUTPATIENT
Start: 2018-06-23 | End: 2018-06-25 | Stop reason: HOSPADM

## 2018-06-23 RX ORDER — NITROGLYCERIN 0.4 MG/1
0.4 TABLET SUBLINGUAL EVERY 5 MIN PRN
Status: DISCONTINUED | OUTPATIENT
Start: 2018-06-23 | End: 2018-06-25 | Stop reason: HOSPADM

## 2018-06-23 RX ORDER — VITAMIN C
1 TAB ORAL DAILY
Status: DISCONTINUED | OUTPATIENT
Start: 2018-06-23 | End: 2018-06-25 | Stop reason: HOSPADM

## 2018-06-23 RX ORDER — IBUPROFEN 600 MG/1
600 TABLET ORAL
Status: DISCONTINUED | OUTPATIENT
Start: 2018-06-23 | End: 2018-06-25 | Stop reason: HOSPADM

## 2018-06-23 RX ORDER — AMLODIPINE BESYLATE 10 MG/1
10 TABLET ORAL DAILY
Status: DISCONTINUED | OUTPATIENT
Start: 2018-06-23 | End: 2018-06-25 | Stop reason: HOSPADM

## 2018-06-23 RX ORDER — PANTOPRAZOLE SODIUM 40 MG/1
40 TABLET, DELAYED RELEASE ORAL
Status: DISCONTINUED | OUTPATIENT
Start: 2018-06-24 | End: 2018-06-25 | Stop reason: HOSPADM

## 2018-06-23 RX ORDER — DONEPEZIL HYDROCHLORIDE 10 MG/1
10 TABLET, ORALLY DISINTEGRATING ORAL NIGHTLY
Status: DISCONTINUED | OUTPATIENT
Start: 2018-06-23 | End: 2018-06-25 | Stop reason: HOSPADM

## 2018-06-23 RX ORDER — LOSARTAN POTASSIUM 50 MG/1
50 TABLET ORAL DAILY
Status: DISCONTINUED | OUTPATIENT
Start: 2018-06-23 | End: 2018-06-25 | Stop reason: HOSPADM

## 2018-06-23 RX ORDER — CLOPIDOGREL BISULFATE 75 MG/1
75 TABLET ORAL DAILY
Status: DISCONTINUED | OUTPATIENT
Start: 2018-06-23 | End: 2018-06-25 | Stop reason: HOSPADM

## 2018-06-23 RX ORDER — ATORVASTATIN CALCIUM 10 MG/1
10 TABLET, FILM COATED ORAL NIGHTLY
Status: DISCONTINUED | OUTPATIENT
Start: 2018-06-23 | End: 2018-06-25 | Stop reason: HOSPADM

## 2018-06-23 RX ADMIN — CLOPIDOGREL BISULFATE 75 MG: 75 TABLET ORAL at 09:47

## 2018-06-23 RX ADMIN — LEVOTHYROXINE SODIUM 100 MCG: 100 TABLET ORAL at 09:48

## 2018-06-23 RX ADMIN — ATORVASTATIN CALCIUM 10 MG: 10 TABLET, FILM COATED ORAL at 20:45

## 2018-06-23 RX ADMIN — ENOXAPARIN SODIUM 40 MG: 40 INJECTION SUBCUTANEOUS at 09:47

## 2018-06-23 RX ADMIN — Medication 10 ML: at 09:48

## 2018-06-23 RX ADMIN — VITAMIN C 1 TABLET: TAB at 09:48

## 2018-06-23 RX ADMIN — METOPROLOL SUCCINATE 100 MG: 50 TABLET, EXTENDED RELEASE ORAL at 09:48

## 2018-06-23 RX ADMIN — PREGABALIN 75 MG: 75 CAPSULE ORAL at 20:45

## 2018-06-23 RX ADMIN — AMLODIPINE BESYLATE 10 MG: 10 TABLET ORAL at 09:47

## 2018-06-23 RX ADMIN — LOSARTAN POTASSIUM 50 MG: 50 TABLET, FILM COATED ORAL at 09:47

## 2018-06-23 RX ADMIN — DONEPEZIL HYDROCHLORIDE 10 MG: 10 TABLET, ORALLY DISINTEGRATING ORAL at 20:45

## 2018-06-23 RX ADMIN — Medication 10 ML: at 20:44

## 2018-06-23 RX ADMIN — SODIUM CHLORIDE: 9 INJECTION, SOLUTION INTRAVENOUS at 09:53

## 2018-06-23 RX ADMIN — IBUPROFEN 600 MG: 600 TABLET, FILM COATED ORAL at 18:36

## 2018-06-23 RX ADMIN — Medication 1 G: at 20:44

## 2018-06-23 ASSESSMENT — PAIN SCALES - GENERAL: PAINLEVEL_OUTOF10: 0

## 2018-06-24 LAB
ANION GAP SERPL CALCULATED.3IONS-SCNC: 11 MMOL/L (ref 7–19)
ANISOCYTOSIS: ABNORMAL
BANDED NEUTROPHILS RELATIVE PERCENT: 7 % (ref 0–5)
BASOPHILS ABSOLUTE: 0 K/UL (ref 0–0.2)
BASOPHILS MANUAL: 0 %
BASOPHILS RELATIVE PERCENT: 0 % (ref 0–1)
BUN BLDV-MCNC: 23 MG/DL (ref 8–23)
BURR CELLS: ABNORMAL
CALCIUM SERPL-MCNC: 8.3 MG/DL (ref 8.8–10.2)
CHLORIDE BLD-SCNC: 106 MMOL/L (ref 98–111)
CO2: 23 MMOL/L (ref 22–29)
CREAT SERPL-MCNC: 0.6 MG/DL (ref 0.5–0.9)
EOSINOPHILS ABSOLUTE: 0 K/UL (ref 0–0.6)
EOSINOPHILS RELATIVE PERCENT: 0 % (ref 0–5)
GFR NON-AFRICAN AMERICAN: >60
GLUCOSE BLD-MCNC: 124 MG/DL (ref 74–109)
HCT VFR BLD CALC: 31.7 % (ref 37–47)
HEMOGLOBIN: 9.7 G/DL (ref 12–16)
LYMPHOCYTES ABSOLUTE: 0.9 K/UL (ref 1.1–4.5)
LYMPHOCYTES RELATIVE PERCENT: 4 % (ref 20–40)
MCH RBC QN AUTO: 29.4 PG (ref 27–31)
MCHC RBC AUTO-ENTMCNC: 30.6 G/DL (ref 33–37)
MCV RBC AUTO: 96.1 FL (ref 81–99)
MONOCYTES ABSOLUTE: 0.2 K/UL (ref 0–0.9)
MONOCYTES RELATIVE PERCENT: 1 % (ref 0–10)
NEUTROPHILS ABSOLUTE: 21.9 K/UL (ref 1.5–7.5)
NEUTROPHILS MANUAL: 88 %
NEUTROPHILS RELATIVE PERCENT: 88 % (ref 50–65)
ORGANISM: ABNORMAL
OVALOCYTES: ABNORMAL
PDW BLD-RTO: 15.6 % (ref 11.5–14.5)
PLATELET # BLD: 144 K/UL (ref 130–400)
PLATELET SLIDE REVIEW: ADEQUATE
PMV BLD AUTO: 11.3 FL (ref 9.4–12.3)
POTASSIUM SERPL-SCNC: 3.6 MMOL/L (ref 3.5–5)
RBC # BLD: 3.3 M/UL (ref 4.2–5.4)
SODIUM BLD-SCNC: 140 MMOL/L (ref 136–145)
URINE CULTURE, ROUTINE: ABNORMAL
URINE CULTURE, ROUTINE: ABNORMAL
WBC # BLD: 23 K/UL (ref 4.8–10.8)

## 2018-06-24 PROCEDURE — 99233 SBSQ HOSP IP/OBS HIGH 50: CPT | Performed by: PSYCHIATRY & NEUROLOGY

## 2018-06-24 PROCEDURE — 80048 BASIC METABOLIC PNL TOTAL CA: CPT

## 2018-06-24 PROCEDURE — 85025 COMPLETE CBC W/AUTO DIFF WBC: CPT

## 2018-06-24 PROCEDURE — 6370000000 HC RX 637 (ALT 250 FOR IP): Performed by: FAMILY MEDICINE

## 2018-06-24 PROCEDURE — 1210000000 HC MED SURG R&B

## 2018-06-24 PROCEDURE — 36415 COLL VENOUS BLD VENIPUNCTURE: CPT

## 2018-06-24 PROCEDURE — 6360000002 HC RX W HCPCS: Performed by: FAMILY MEDICINE

## 2018-06-24 PROCEDURE — 2580000003 HC RX 258: Performed by: FAMILY MEDICINE

## 2018-06-24 RX ADMIN — IBUPROFEN 600 MG: 600 TABLET, FILM COATED ORAL at 09:12

## 2018-06-24 RX ADMIN — VITAMIN C 1 TABLET: TAB at 09:14

## 2018-06-24 RX ADMIN — DONEPEZIL HYDROCHLORIDE 10 MG: 10 TABLET, ORALLY DISINTEGRATING ORAL at 20:32

## 2018-06-24 RX ADMIN — Medication 10 ML: at 20:32

## 2018-06-24 RX ADMIN — METOPROLOL SUCCINATE 100 MG: 50 TABLET, EXTENDED RELEASE ORAL at 09:12

## 2018-06-24 RX ADMIN — AMLODIPINE BESYLATE 10 MG: 10 TABLET ORAL at 09:12

## 2018-06-24 RX ADMIN — PREGABALIN 75 MG: 75 CAPSULE ORAL at 20:32

## 2018-06-24 RX ADMIN — TIOTROPIUM BROMIDE 18 MCG: 18 CAPSULE ORAL; RESPIRATORY (INHALATION) at 09:12

## 2018-06-24 RX ADMIN — ATORVASTATIN CALCIUM 10 MG: 10 TABLET, FILM COATED ORAL at 20:32

## 2018-06-24 RX ADMIN — PANTOPRAZOLE SODIUM 40 MG: 40 TABLET, DELAYED RELEASE ORAL at 06:12

## 2018-06-24 RX ADMIN — CLOPIDOGREL BISULFATE 75 MG: 75 TABLET ORAL at 09:12

## 2018-06-24 RX ADMIN — LOSARTAN POTASSIUM 50 MG: 50 TABLET, FILM COATED ORAL at 09:12

## 2018-06-24 RX ADMIN — LEVOTHYROXINE SODIUM 100 MCG: 100 TABLET ORAL at 06:12

## 2018-06-24 RX ADMIN — IBUPROFEN 600 MG: 600 TABLET, FILM COATED ORAL at 12:13

## 2018-06-24 RX ADMIN — Medication 1 G: at 20:31

## 2018-06-24 ASSESSMENT — PAIN SCALES - GENERAL
PAINLEVEL_OUTOF10: 5
PAINLEVEL_OUTOF10: 1

## 2018-06-25 VITALS
SYSTOLIC BLOOD PRESSURE: 135 MMHG | BODY MASS INDEX: 21.03 KG/M2 | TEMPERATURE: 97.3 F | WEIGHT: 104.1 LBS | HEART RATE: 79 BPM | RESPIRATION RATE: 18 BRPM | OXYGEN SATURATION: 91 % | DIASTOLIC BLOOD PRESSURE: 63 MMHG

## 2018-06-25 LAB
ANION GAP SERPL CALCULATED.3IONS-SCNC: 16 MMOL/L (ref 7–19)
BASOPHILS ABSOLUTE: 0.1 K/UL (ref 0–0.2)
BASOPHILS RELATIVE PERCENT: 0.3 % (ref 0–1)
BUN BLDV-MCNC: 15 MG/DL (ref 8–23)
CALCIUM SERPL-MCNC: 8.5 MG/DL (ref 8.8–10.2)
CHLORIDE BLD-SCNC: 105 MMOL/L (ref 98–111)
CO2: 22 MMOL/L (ref 22–29)
CREAT SERPL-MCNC: 0.6 MG/DL (ref 0.5–0.9)
EOSINOPHILS ABSOLUTE: 0.1 K/UL (ref 0–0.6)
EOSINOPHILS RELATIVE PERCENT: 0.5 % (ref 0–5)
GFR NON-AFRICAN AMERICAN: >60
GLUCOSE BLD-MCNC: 93 MG/DL (ref 74–109)
HCT VFR BLD CALC: 34.3 % (ref 37–47)
HEMOGLOBIN: 10.7 G/DL (ref 12–16)
LYMPHOCYTES ABSOLUTE: 0.8 K/UL (ref 1.1–4.5)
LYMPHOCYTES RELATIVE PERCENT: 4.3 % (ref 20–40)
MCH RBC QN AUTO: 29.1 PG (ref 27–31)
MCHC RBC AUTO-ENTMCNC: 31.2 G/DL (ref 33–37)
MCV RBC AUTO: 93.2 FL (ref 81–99)
MONOCYTES ABSOLUTE: 1.2 K/UL (ref 0–0.9)
MONOCYTES RELATIVE PERCENT: 6.7 % (ref 0–10)
NEUTROPHILS ABSOLUTE: 15.3 K/UL (ref 1.5–7.5)
NEUTROPHILS RELATIVE PERCENT: 83.7 % (ref 50–65)
PDW BLD-RTO: 15.7 % (ref 11.5–14.5)
PLATELET # BLD: 143 K/UL (ref 130–400)
PMV BLD AUTO: 10.9 FL (ref 9.4–12.3)
POTASSIUM SERPL-SCNC: 2.9 MMOL/L (ref 3.5–5)
RBC # BLD: 3.68 M/UL (ref 4.2–5.4)
SODIUM BLD-SCNC: 143 MMOL/L (ref 136–145)
WBC # BLD: 18.3 K/UL (ref 4.8–10.8)

## 2018-06-25 PROCEDURE — 6370000000 HC RX 637 (ALT 250 FOR IP): Performed by: FAMILY MEDICINE

## 2018-06-25 PROCEDURE — 36415 COLL VENOUS BLD VENIPUNCTURE: CPT

## 2018-06-25 PROCEDURE — 85025 COMPLETE CBC W/AUTO DIFF WBC: CPT

## 2018-06-25 PROCEDURE — 80048 BASIC METABOLIC PNL TOTAL CA: CPT

## 2018-06-25 PROCEDURE — 99232 SBSQ HOSP IP/OBS MODERATE 35: CPT | Performed by: PSYCHIATRY & NEUROLOGY

## 2018-06-25 RX ORDER — POTASSIUM CHLORIDE 20 MEQ/1
20 TABLET, EXTENDED RELEASE ORAL 3 TIMES DAILY
Status: DISCONTINUED | OUTPATIENT
Start: 2018-06-25 | End: 2018-06-25 | Stop reason: HOSPADM

## 2018-06-25 RX ORDER — IBUPROFEN 600 MG/1
600 TABLET ORAL EVERY 8 HOURS PRN
Qty: 90 TABLET | Refills: 3 | Status: ON HOLD | OUTPATIENT
Start: 2018-06-25 | End: 2019-05-28 | Stop reason: HOSPADM

## 2018-06-25 RX ORDER — POTASSIUM CHLORIDE 1500 MG/1
20 TABLET, FILM COATED, EXTENDED RELEASE ORAL DAILY
Qty: 30 TABLET | Refills: 5 | Status: SHIPPED | OUTPATIENT
Start: 2018-06-25

## 2018-06-25 RX ORDER — CEPHALEXIN 500 MG/1
500 CAPSULE ORAL 3 TIMES DAILY
Qty: 21 CAPSULE | Refills: 0 | Status: SHIPPED | OUTPATIENT
Start: 2018-06-25 | End: 2018-07-02

## 2018-06-25 RX ORDER — CEPHALEXIN 500 MG/1
500 CAPSULE ORAL EVERY 8 HOURS SCHEDULED
Status: DISCONTINUED | OUTPATIENT
Start: 2018-06-25 | End: 2018-06-25 | Stop reason: HOSPADM

## 2018-06-25 RX ADMIN — PANTOPRAZOLE SODIUM 40 MG: 40 TABLET, DELAYED RELEASE ORAL at 06:28

## 2018-06-25 RX ADMIN — IBUPROFEN 600 MG: 600 TABLET, FILM COATED ORAL at 06:28

## 2018-06-25 RX ADMIN — CLOPIDOGREL BISULFATE 75 MG: 75 TABLET ORAL at 09:31

## 2018-06-25 RX ADMIN — METOPROLOL SUCCINATE 100 MG: 50 TABLET, EXTENDED RELEASE ORAL at 09:31

## 2018-06-25 RX ADMIN — VITAMIN C 1 TABLET: TAB at 09:31

## 2018-06-25 RX ADMIN — POTASSIUM CHLORIDE 20 MEQ: 20 TABLET, EXTENDED RELEASE ORAL at 09:31

## 2018-06-25 RX ADMIN — LOSARTAN POTASSIUM 50 MG: 50 TABLET, FILM COATED ORAL at 09:31

## 2018-06-25 RX ADMIN — TIOTROPIUM BROMIDE 18 MCG: 18 CAPSULE ORAL; RESPIRATORY (INHALATION) at 09:32

## 2018-06-25 RX ADMIN — AMLODIPINE BESYLATE 10 MG: 10 TABLET ORAL at 09:30

## 2018-06-25 RX ADMIN — LEVOTHYROXINE SODIUM 100 MCG: 100 TABLET ORAL at 06:28

## 2018-06-25 RX ADMIN — CEPHALEXIN 500 MG: 500 CAPSULE ORAL at 09:35

## 2018-06-25 ASSESSMENT — PAIN SCALES - GENERAL: PAINLEVEL_OUTOF10: 5

## 2018-06-26 LAB
ANION GAP SERPL CALCULATED.3IONS-SCNC: 17 MMOL/L (ref 7–19)
ANISOCYTOSIS: ABNORMAL
BANDED NEUTROPHILS RELATIVE PERCENT: 5 % (ref 0–5)
BASOPHILS ABSOLUTE: 0 K/UL (ref 0–0.2)
BASOPHILS MANUAL: 0 %
BASOPHILS RELATIVE PERCENT: 0 % (ref 0–1)
BUN BLDV-MCNC: 13 MG/DL (ref 8–23)
CALCIUM SERPL-MCNC: 8.7 MG/DL (ref 8.8–10.2)
CHLORIDE BLD-SCNC: 102 MMOL/L (ref 98–111)
CO2: 24 MMOL/L (ref 22–29)
CREAT SERPL-MCNC: 0.7 MG/DL (ref 0.5–0.9)
EOSINOPHILS ABSOLUTE: 0.33 K/UL (ref 0–0.6)
EOSINOPHILS RELATIVE PERCENT: 3 % (ref 0–5)
GFR NON-AFRICAN AMERICAN: >60
GLUCOSE BLD-MCNC: 83 MG/DL (ref 74–109)
HCT VFR BLD CALC: 38.7 % (ref 37–47)
HEMOGLOBIN: 12.1 G/DL (ref 12–16)
LYMPHOCYTES ABSOLUTE: 1.1 K/UL (ref 1.1–4.5)
LYMPHOCYTES RELATIVE PERCENT: 10 % (ref 20–40)
MCH RBC QN AUTO: 29 PG (ref 27–31)
MCHC RBC AUTO-ENTMCNC: 31.3 G/DL (ref 33–37)
MCV RBC AUTO: 92.8 FL (ref 81–99)
METAMYELOCYTES RELATIVE PERCENT: 2 %
MONOCYTES ABSOLUTE: 0.8 K/UL (ref 0–0.9)
MONOCYTES RELATIVE PERCENT: 7 % (ref 0–10)
MYELOCYTE PERCENT: 1 %
NEUTROPHILS ABSOLUTE: 8.7 K/UL (ref 1.5–7.5)
NEUTROPHILS MANUAL: 72 %
NEUTROPHILS RELATIVE PERCENT: 72 % (ref 50–65)
PDW BLD-RTO: 15.7 % (ref 11.5–14.5)
PLATELET # BLD: 161 K/UL (ref 130–400)
PLATELET SLIDE REVIEW: ADEQUATE
PMV BLD AUTO: 11.5 FL (ref 9.4–12.3)
POTASSIUM SERPL-SCNC: 3.5 MMOL/L (ref 3.5–5)
RBC # BLD: 4.17 M/UL (ref 4.2–5.4)
SODIUM BLD-SCNC: 143 MMOL/L (ref 136–145)
WBC # BLD: 10.9 K/UL (ref 4.8–10.8)

## 2018-06-28 LAB
BLOOD CULTURE, ROUTINE: NORMAL
CULTURE, BLOOD 2: NORMAL

## 2018-06-29 ENCOUNTER — HOSPITAL ENCOUNTER (OUTPATIENT)
Dept: GENERAL RADIOLOGY | Age: 83
Discharge: HOME | End: 2018-06-29
Payer: MEDICARE

## 2018-06-29 DIAGNOSIS — M25.552 LEFT HIP PAIN: ICD-10-CM

## 2018-06-29 PROCEDURE — 73502 X-RAY EXAM HIP UNI 2-3 VIEWS: CPT

## 2018-07-22 PROBLEM — N39.0 UTI (URINARY TRACT INFECTION): Status: RESOLVED | Noted: 2018-06-22 | Resolved: 2018-07-22
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
## 2018-09-26 ENCOUNTER — TELEPHONE (OUTPATIENT)
Dept: NEUROLOGY | Age: 83
End: 2018-09-26

## 2018-11-14 LAB
EKG P AXIS: 53 DEGREES
EKG P-R INTERVAL: 206 MS
EKG Q-T INTERVAL: 414 MS
EKG QRS DURATION: 118 MS
EKG QTC CALCULATION (BAZETT): 431 MS
EKG T AXIS: 111 DEGREES

## 2018-11-29 ENCOUNTER — OFFICE VISIT (OUTPATIENT)
Dept: CARDIOLOGY | Age: 83
End: 2018-11-29
Payer: MEDICARE

## 2018-11-29 VITALS
SYSTOLIC BLOOD PRESSURE: 118 MMHG | WEIGHT: 96 LBS | HEART RATE: 68 BPM | BODY MASS INDEX: 19.35 KG/M2 | DIASTOLIC BLOOD PRESSURE: 66 MMHG | HEIGHT: 59 IN

## 2018-11-29 DIAGNOSIS — I25.10 CORONARY ARTERY DISEASE INVOLVING NATIVE CORONARY ARTERY OF NATIVE HEART WITHOUT ANGINA PECTORIS: Primary | ICD-10-CM

## 2018-11-29 DIAGNOSIS — I10 ESSENTIAL HYPERTENSION: ICD-10-CM

## 2018-11-29 DIAGNOSIS — Z95.5 STATUS POST INSERTION OF DRUG-ELUTING STENT INTO LEFT ANTERIOR DESCENDING ARTERY: ICD-10-CM

## 2018-11-29 DIAGNOSIS — E78.2 MIXED HYPERLIPIDEMIA: ICD-10-CM

## 2018-11-29 PROCEDURE — 99214 OFFICE O/P EST MOD 30 MIN: CPT | Performed by: NURSE PRACTITIONER

## 2018-11-29 PROCEDURE — G8484 FLU IMMUNIZE NO ADMIN: HCPCS | Performed by: NURSE PRACTITIONER

## 2018-11-29 PROCEDURE — 1090F PRES/ABSN URINE INCON ASSESS: CPT | Performed by: NURSE PRACTITIONER

## 2018-11-29 PROCEDURE — 1101F PT FALLS ASSESS-DOCD LE1/YR: CPT | Performed by: NURSE PRACTITIONER

## 2018-11-29 PROCEDURE — G8420 CALC BMI NORM PARAMETERS: HCPCS | Performed by: NURSE PRACTITIONER

## 2018-11-29 PROCEDURE — G8427 DOCREV CUR MEDS BY ELIG CLIN: HCPCS | Performed by: NURSE PRACTITIONER

## 2018-11-29 NOTE — PROGRESS NOTES
[facility] [address] [place] [address]. [facility] [address] [facility]  [phone] office  [phone] fax      OFFICE VISIT:  2018    Arlene Armenta - : 1935    Reason For Visit:  Amarilis Blanchard is a 80 y.o. female who is here for 6 Month Follow-Up (Patient presents for 6 month cardiology follow up doing well.); Coronary Artery Disease; Hypertension; and Hyperlipidemia    The patient presents today for cardiology follow up. Overall, the patient is doing well from a cardiac standpoint without symptoms to suggest myocardial ischemia. BP is well controlled on current regimen. The patient's PCP monitors cholesterol. Subjective  Sondra denies exertional chest pain, shortness of breath, orthopnea, paroxysmal nocturnal dyspnea, syncope, presyncope, sustained arrythmia, edema and fatigue. The patient denies numbness or weakness to suggest cerebrovascular accident or transient ischemic attack.       Arlene Armenta has the following history as recorded in [facility]:    Patient Active Problem List   Diagnosis Code    Hyperlipidemia E78.5    Hypertension I10    Hypothyroidism E03.9    Atherosclerosis of native artery of extremity with intermittent claudication ([address] I70.219    Multiple sclerosis ([address] G35    Seizure disorder ([address] G40.909    Cerebrovascular disease I67.9    Carotid artery stenosis I65.29    AAA (abdominal aortic aneurysm) without rupture ([facility] I71.4    Right carotid bruit R09.89    Smoker F17.200    Chronic respiratory failure ([facility]) J96.10    Acute ST elevation myocardial infarction (STEMI) involving left anterior descending (LAD) coronary artery ([facility]) I21.02    Stenosis of right carotid artery I65.21    Vascular disease I99.9    2 KELY to LAD Z95.5    Coronary artery disease involving native coronary artery of native heart without angina pectoris I25.10    Altered mental state R41.82    Tremor R25.1    Fatigue R53.83 Past Medical History:   Diagnosis Date    AAA (abdominal aortic aneurysm) without rupture ([facility] 3/25/2013    Arteriosclerotic heart disease     Coronary artery disease with LAD x2 drug-eluting stents. 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   [facility] Hypertension 2009    Hypothyroidism     Multiple sclerosis ([facility]     Seizure disorder ([facility]     Possible    Thyroid disease 2009    Tobacco abuse, continuous 2009     Past Surgical History:   Procedure Laterality Date    ABDOMINAL AORTIC ANEURYSM REPAIR  TJR/12.14.09    5.2 CM AAA; 14MM INTERPOSITIONAL DACRON GRAFT    APPENDECTOMY      CARDIAC CATHETERIZATION  06/11/2002    EF60%     CARDIAC CATHETERIZATION  6/17/15  [address]    EF 70%    CARDIAC CATHETERIZATION  12/11/2015    EF 60%, x2 drug-eluting stents LAD.  CARDIAC VALVE SURGERY  TJR/12.11.09    & R/O    CAROTID ENDARTERECTOMY Right 1/11/2016    CAROTID ENDARTERECTOMY WITH EEG  performed by Minh Galvez MD at [address] [place] [address]      DIAGNOSTIC CARDIAC CATH LAB PROCEDURE  6/2/03    EF over 60% Normal LV function and hemodynamics, Mild nonocclusive CAD, w/o hemodynamically significant  lesions  identified     DIAGNOSTIC CARDIAC CATH LAB PROCEDURE  2/24/12    2 stents    ENDOSCOPY, COLON, DIAGNOSTIC      EYE SURGERY Bilateral     cataracts    FOOT SURGERY Right     exc. lesion/bx.     FRACTURE SURGERY Right     elbow    OVARIAN CYST SURGERY      THYROID SURGERY      VASCULAR SURGERY  05- TJR    Percutaneous Transluminal Arterial Angioplasty of left superficial femoral artery using a 6*2 cutting MG tablet Take 10 mg by mouth daily.  Levothyroxine Sodium (SYNTHROID PO) Take 0.1 mcg by mouth.  pregabalin (LYRICA) 75 MG capsule Take 75 mg by mouth nightly. No current facility-administered medications for this visit. Allergies: Morphine    Review of Systems  Constitutional - no appetite change, or unexpected weight change. No fever, chills or diaphoresis. No significant change in activity level or new onset of fatigue. HEENT - no significant rhinorrhea or epistaxis. No tinnitus or significant hearing loss. Eyes - no sudden vision change or amaurosis. No corneal arcus, xantholasma, subconjunctival hemorrhage or discharge. Respiratory - no significant wheezing, stridor, apnea or cough. No dyspnea on exertion or shortness of air. Cardiovascular - no exertional chest pain to suggest myocardial ischemia. No orthopnea or PND. No sensation of sustained arrythmia. No occurrence of slow heart rate. No palpitations. No claudication. No leg edema. Gastrointestinal - no abdominal swelling or pain. No blood in stool. No severe constipation, diarrhea, nausea, or vomiting. Genitourinary - no dysuria, frequency, or urgency. No flank pain or hematuria. Musculoskeletal - no back pain or myalgia. Transported via wheelchair. Extremities - no clubbing, cyanosis or edema. Skin - no color change or rash. No pallor. No new surgical incision. Neurologic - no speech difficulty, facial asymmetry or lateralizing weakness. No seizures, presyncope or syncope. No significant dizziness. Hematologic - no easy bruising or excessive bleeding. Psychiatric - no severe anxiety or insomnia. No confusion. All other review of systems are negative. Objective  Vital Signs - /66   Pulse 68   Ht 4' 11\" (1.499 m)   Wt 96 lb (43.5 kg)   BMI 19.39 kg/m²   General - Sondra is alert, cooperative, and pleasant. Well groomed. No acute distress.     Body habitus - Body mass index is 19.39 instructions:  Coronary artery disease risk factors you can control: Smoking, high blood pressure, high cholesterol, diabetes, being overweight, lack of exercise and stress. Continue heart healthy diet. Take medications as directed. Exercise as tolerated. Strive for 15 minutes of exercise most days of the week. If asked to keep a blood pressure log, do so for 2 weeks. Call the office to report readings at [phone]. Blood pressure goal  is less than 120/70. Elevated blood pressure at 120-129/80 or less. High blood pressure at 130-139/80-89. If you are taking cholesterol lowering medications, it is recommended that lab work be checked annually. Always keep a current medication list. Bring your medications to every office visit. Life simple 7  1) Manage blood pressure - high blood pressure is a major risk factor for heart disease and stroke. Keeping blood pressure in health range reduces strain on your heart, arteries and kidneys. 2) Control cholesterol - contributes to plaque, which can clog arteries and lead to heart disease and stroke. When you control your cholesterol you are giving your arteries their best chance to remain clear. 3) Reduce blood sugar - most of the food we eat is turning into glucose or blood sugar that our body uses for energy. Over time, high levels of blood sugar can damage your heart, kidneys, eyes and nerves. 4) Get active - living an active life is one of the most rewarding gifts you can give yourself and those you love. Simply put, daily physical activity increases your length and quality of life. 5)  Eat better - A healthy diet is one of your best weapons for fighting cardiovascular disease. When you eat a heart healthy diet, you improve your chances for feeling good and staying healthy for life. 6)  Lose weight - when you shed extra fat an unnecessary pounds, you reduce the burden on your hear, lungs, blood vessels and skeleton.   You give yourself the gift of active living, you lower your blood pressure and help yourself feel better. 7) Stop smoking - cigarette smokers have a higher risk of developing cardiovascular disease. If  You smoke, quitting is the best thing you can do for your health. Check American Heart Association on line for more information on Life's Simple 7 and tips for healthy living.      ALLEN Odonnell

## 2018-11-30 ENCOUNTER — OFFICE VISIT (OUTPATIENT)
Dept: PULMONOLOGY | Facility: CLINIC | Age: 83
End: 2018-11-30

## 2018-11-30 VITALS
HEIGHT: 65 IN | HEART RATE: 58 BPM | SYSTOLIC BLOOD PRESSURE: 108 MMHG | WEIGHT: 96 LBS | BODY MASS INDEX: 15.99 KG/M2 | DIASTOLIC BLOOD PRESSURE: 62 MMHG | OXYGEN SATURATION: 96 %

## 2018-11-30 DIAGNOSIS — J44.9 STAGE 3 SEVERE COPD BY GOLD CLASSIFICATION (HCC): Primary | ICD-10-CM

## 2018-11-30 DIAGNOSIS — F17.210 CIGARETTE NICOTINE DEPENDENCE WITHOUT COMPLICATION: ICD-10-CM

## 2018-11-30 DIAGNOSIS — R63.6 UNDERWEIGHT: ICD-10-CM

## 2018-11-30 DIAGNOSIS — Z87.891 PERSONAL HISTORY OF NICOTINE DEPENDENCE: ICD-10-CM

## 2018-11-30 PROCEDURE — 99214 OFFICE O/P EST MOD 30 MIN: CPT | Performed by: NURSE PRACTITIONER

## 2018-11-30 RX ORDER — FUROSEMIDE 40 MG/1
40 TABLET ORAL
COMMUNITY

## 2018-11-30 RX ORDER — POTASSIUM CHLORIDE 750 MG/1
CAPSULE, EXTENDED RELEASE ORAL
COMMUNITY
Start: 2018-11-20

## 2018-11-30 RX ORDER — TIOTROPIUM BROMIDE AND OLODATEROL 3.124; 2.736 UG/1; UG/1
SPRAY, METERED RESPIRATORY (INHALATION)
COMMUNITY
Start: 2018-09-27

## 2018-11-30 RX ORDER — IBUPROFEN 600 MG/1
TABLET ORAL
COMMUNITY
Start: 2018-10-08

## 2018-11-30 RX ORDER — FUROSEMIDE 40 MG/1
TABLET ORAL
COMMUNITY
Start: 2018-10-08 | End: 2019-05-31

## 2018-11-30 RX ORDER — FEXOFENADINE HCL 180 MG/1
TABLET ORAL
COMMUNITY
Start: 2018-11-03

## 2018-11-30 NOTE — PROGRESS NOTES
PAOLO Shannon  [facility]   Respiratory Disease Clinic  [address] [place] [address]  Phone: [phone]  Fax: [phone]     Kate Carpenter is a 83 y.o. female.   CC:   Chief Complaint   Patient presents with   • Follow-up      HPI: Kate Carpenter is a pleasant 83 y.o. female. The patient is here today for follow up of COPD.  The patient is doing well from a pulmonary standpoint.  She is accompanied by her granddaughter.  No increasing shortness of breath, no fever, no chills, no cough with purulent sputum. She thinks she is using stiolto.  I did show the patient and granddaughter samples of Spiriva and stiolto today from the office and they both indicated that the patient is using stiolto.  The patient was given samples of stiolto today from the office.  Review of records shows that she was hospitalized in June 2018 for urinary tract infection.  The patient does not remember this hospitalization.  The patient continues is a current smoker.  She has no intentions of stopping smoking.  The patient's PCP is Toño Holland MD.  The patient is current with flu vaccine.  The patient is current with pneumonia vaccine.      The following portions of the patient's history were reviewed and updated as appropriate: allergies, current medications, past family history, past medical history, past social history, past surgical history and problem list.  Past Medical History:   Diagnosis Date   • Colon polyp    • Coronary artery disease    • CVD (cardiovascular disease)    • GERD (gastroesophageal reflux disease)    • MS (multiple sclerosis) (CMS/[facility])    • Spastic colon      No family history on file.  Social History     Socioeconomic History   • Marital status: Unknown     Spouse name: Not on file   • Number of children: Not on file   • Years of education: Not on file   • Highest education level: Not on file   Social Needs   • Financial resource strain: Not on file   • Food insecurity -  worry: Not on file   • Food insecurity - inability: Not on file   • Transportation needs - medical: Not on file   • Transportation needs - non-medical: Not on file   Occupational History   • Not on file   Tobacco Use   • Smoking status: Current Every Day Smoker     Packs/day: 3.00     Types: Cigarettes   • Smokeless tobacco: Never Used   Substance and Sexual Activity   • Alcohol use: No   • Drug use: No   • Sexual activity: No   Other Topics Concern   • Not on file   Social History Narrative   • Not on file     Review of Systems   Constitutional: Negative for chills and fever.   HENT: Negative for congestion.    Eyes: Negative for blurred vision.   Respiratory: Negative for cough and shortness of breath.    Cardiovascular: Negative for chest pain.   Gastrointestinal: Negative for diarrhea, nausea and vomiting.   Endocrine: Negative for cold intolerance and heat intolerance.   Genitourinary: Negative for dysuria.   Musculoskeletal: Negative for arthralgias.   Skin: Negative for rash.   Neurological: Negative for dizziness, weakness and light-headedness.   Hematological: Does not bruise/bleed easily.   Psychiatric/Behavioral: Negative for agitation. The patient is not nervous/anxious.      Vitals:    11/30/18 0954   BP: 108/62   Pulse: 58   SpO2: 96%     Physical Exam   Constitutional: She is oriented to person, place, and time. She appears well-developed and well-nourished. No distress.   Frail and elderly   HENT:   Head: Normocephalic and atraumatic.   Eyes: Conjunctivae and EOM are normal. Pupils are equal, round, and reactive to light. No scleral icterus.   Neck: Normal range of motion. Neck supple.   Cardiovascular: Normal rate, regular rhythm and normal heart sounds. Exam reveals no friction rub.   No murmur heard.  Pulmonary/Chest: Effort normal and breath sounds normal. No respiratory distress. She has no wheezes. She has no rales.   Abdominal: Soft. Bowel sounds are normal. She exhibits no distension. There  is no tenderness.   Musculoskeletal: Normal range of motion. She exhibits no edema.   Ambulates with a cane   Neurological: She is alert and oriented to person, place, and time.   Skin: Skin is warm and dry.   Psychiatric: She has a normal mood and affect. Her behavior is normal. Judgment and thought content normal.   Nursing note and vitals reviewed.    Pulmonary Functions Testing Results:  No results found for: FEV1, FVC, XBY0YNO, TLC, DLCO  My PFT Interpretation: None today  Imaging: None today    Assessment and Plan:   Kate was seen today for follow-up.    Diagnoses and all orders for this visit:    Stage 3 severe COPD by GOLD classification (CMS/[facility])  -     tiotropium bromide-olodaterol (STIOLTO RESPIMAT) 2.5-2.5 MCG/ACT aerosol solution inhaler; Inhale 2 puffs Daily for 30 days.  The patient could not remember if she was using Spiriva or stiolto.  After showing the patient and her granddaughter samples of both Spiriva and stiolto they determined it was stiolto that the patient is using.  I did give the patient samples of stiolto today from the office.    Underweight  Diet education provided    Personal history of nicotine dependence   The patient continues is a smoker.     Cigarette nicotine dependence without complication  Written smoking cessation information provided.  The patient has no intentions of stopping smoking.    Patient's Body mass index is 15.98 kg/m². BMI is below normal parameters. Recommendations include: Diet education provided.    Follow up: 6 months with flow volume loop plus diffusion  PAOLO Shannon  11/30/2018  2:28 PM

## 2018-11-30 NOTE — PATIENT INSTRUCTIONS
Smoking Tobacco Information  Smoking tobacco will very likely harm your health. Tobacco contains a poisonous (toxic), colorless chemical called nicotine. Nicotine affects the brain and makes tobacco addictive. This change in your brain can make it hard to stop smoking. Tobacco also has other toxic chemicals that can hurt your body and raise your risk of many cancers.  How can smoking tobacco affect me?  Smoking tobacco can increase your chances of having serious health conditions, such as:  · Cancer. Smoking is most commonly associated with lung cancer, but can lead to cancer in other parts of the body.  · Chronic obstructive pulmonary disease (COPD). This is a long-term lung condition that makes it hard to breathe. It also gets worse over time.  · High blood pressure (hypertension), heart disease, stroke, or heart attack.  · Lung infections, such as pneumonia.  · Cataracts. This is when the lenses in the eyes become clouded.  · Digestive problems. This may include peptic ulcers, heartburn, and gastroesophageal reflux disease (GERD).  · Oral health problems, such as gum disease and tooth loss.  · Loss of taste and smell.    Smoking can affect your appearance by causing:  · Wrinkles.  · Yellow or stained teeth, fingers, and fingernails.    Smoking tobacco can also affect your social life.  · Many workplaces, restaurants, hotels, and public places are tobacco-free. This means that you may experience challenges in finding places to smoke when away from home.  · The cost of a smoking habit can be expensive. Expenses for someone who smokes come in two ways:  ? You spend money on a regular basis to buy tobacco.  ? Your health care costs in the long-term are higher if you smoke.  · Tobacco smoke can also affect the health of those around you. Children of smokers have greater chances of:  ? Sudden infant death syndrome (SIDS).  ? Ear infections.  ? Lung infections.    What lifestyle changes can be made?  · Do not start  smoking. Quit if you already do.  · To quit smoking:  ? Make a plan to quit smoking and commit yourself to it. Look for programs to help you and ask your health care provider for recommendations and ideas.  ? Talk with your health care provider about using nicotine replacement medicines to help you quit. Medicine replacement medicines include gum, lozenges, patches, sprays, or pills.  ? Do not replace cigarette smoking with electronic cigarettes, which are commonly called e-cigarettes. The safety of e-cigarettes is not known, and some may contain harmful chemicals.  ? Avoid places, people, or situations that tempt you to smoke.  ? If you try to quit but return to smoking, don't give up hope. It is very common for people to try a number of times before they fully succeed. When you feel ready again, give it another try.  · Quitting smoking might affect the way you eat as well as your weight. Be prepared to monitor your eating habits. Get support in planning and following a healthy diet.  · Ask your health care provider about having regular tests (screenings) to check for cancer. This may include blood tests, imaging tests, and other tests.  · Exercise regularly. Consider taking walks, joining a gym, or doing yoga or exercise classes.  · Develop skills to manage your stress. These skills include meditation.  What are the benefits of quitting smoking?  By quitting smoking, you may:  · Lower your risk of getting cancer and other diseases caused by smoking.  · Live longer.  · Breathe better.  · Lower your blood pressure and heart rate.  · Stop your addiction to tobacco.  · Stop creating secondhand smoke that hurts other people.  · Improve your sense of taste and smell.  · Look better over time, due to having fewer wrinkles and less staining.    What can happen if changes are not made?  If you do not stop smoking, you may:  · Get cancer and other diseases.  · Develop COPD or other long-term (chronic) lung  conditions.  · Develop serious problems with your heart and blood vessels (cardiovascular system).  · Need more tests to screen for problems caused by smoking.  · Have higher, long-term healthcare costs from medicines or treatments related to smoking.  · Continue to have worsening changes in your lungs, mouth, and nose.    Where to find support:  To get support to quit smoking, consider:  · Asking your health care provider for more information and resources.  · Taking classes to learn more about quitting smoking.  · Looking for local organizations that offer resources about quitting smoking.  · Joining a support group for people who want to quit smoking in your local community.    Where to find more information:  You may find more information about quitting smoking from:  · HelpGuide.org: www.helpguide.org/articles/addictions/how-to-quit-smoking.htm  · Smokefree.gov: smokefree.gov  · American Lung Association: www.lung.org    Contact a health care provider if:  · You have problems breathing.  · Your lips, nose, or fingers turn blue.  · You have chest pain.  · You are coughing up blood.  · You feel faint or you pass out.  · You have other noticeable changes that cause you to worry.  Summary  · Smoking tobacco can negatively affect your health, the health of those around you, your finances, and your social life.  · Do not start smoking. Quit if you already do. If you need help quitting, ask your health care provider.  · Think about joining a support group for people who want to quit smoking in your local community. There are many effective programs that will help you to quit this behavior.  This information is not intended to replace advice given to you by your health care provider. Make sure you discuss any questions you have with your health care provider.  Document Released: 01/02/2018 Document Revised: 01/02/2018 Document Reviewed: 01/02/2018  Elsevier Interactive Patient Education © 2018 [employer]  High-Protein and High-Calorie Diet  Eating high-protein and high-calorie foods can help you to gain weight, heal after an injury, and recover after an illness or surgery.  What is my plan?  The specific amount of daily protein and calories you need depends on:  · Your body weight.  · The reason this diet is recommended for you.    Generally, a high-protein, high-calorie diet involves:  · Eating 250-500 extra calories each day.  · Making sure that 10-35% of your daily calories come from protein.    Talk to your health care provider about how much protein and how many calories you need each day. Follow the diet as directed by your health care provider.  What do I need to know about this diet?  · Ask your health care provider if you should take a nutritional supplement.  · Try to eat six small meals each day instead of three large meals.  · Eat a balanced diet, including one food that is high in protein at each meal.  · Keep nutritious snacks handy, such as nuts, trail mixes, dried fruit, and yogurt.  · If you have kidney disease or diabetes, eating too much protein may put extra stress on your kidneys. Talk to your health care provider if you have either of those conditions.  What are some high-protein foods?  Grains  Quinoa. Bulgur wheat.  Vegetables  Soybeans. Peas.  Meats and Other Protein Sources  Beef, pork, and poultry. Fish and seafood. Eggs. Tofu. Textured vegetable protein (TVP). Peanut butter. Nuts and seeds. Dried beans. Protein powders.  Dairy  Whole milk. Whole-milk yogurt. Powdered milk. Cheese. Cottage Cheese. Eggnog.  Beverages  High-protein supplement drinks. Soy milk.  Other  Protein bars.  The items listed above may not be a complete list of recommended foods or beverages. Contact your dietitian for more options.  What are some high-calorie foods?  Grains  Pasta. Quick breads. Muffins. Pancakes. Ready-to-eat cereal.  Vegetables  Vegetables cooked in oil or butter. Fried  potatoes.  Fruits  Dried fruit. Fruit leather. Canned fruit in syrup. Fruit juice. Avocados.  Meats and Other Protein Sources  Peanut butter. Nuts and seeds.  Dairy  Heavy cream. Whipped cream. Cream cheese. Sour cream. Ice cream. Custard. Pudding.  Beverages  Meal-replacement beverages. Nutrition shakes. Fruit juice. Sugar-sweetened soft drinks.  Condiments  Salad dressing. Mayonnaise. Roger sauce. Fruit preserves or jelly. Honey. Syrup.  Sweets/Desserts  Cake. Cookies. Pie. Pastries. Candy bars. Chocolate.  Fats and Oils  Butter or margarine. Oil. Gravy.  Other  Meal-replacement bars.  The items listed above may not be a complete list of recommended foods or beverages. Contact your dietitian for more options.  What are some tips for including high-protein and high-calorie foods in my diet?  · Add whole milk, half-and-half, or heavy cream to cereal, pudding, soup, or hot cocoa.  · Add whole milk to instant breakfast drinks.  · Add peanut butter to oatmeal or smoothies.  · Add powdered milk to baked goods, smoothies, or milkshakes.  · Add powdered milk, cream, or butter to mashed potatoes.  · Add cheese to cooked vegetables.  · Make whole-milk yogurt parfaits. Top them with granola, fruit, or nuts.  · Add cottage cheese to your fruit.  · Add avocados, cheese, or both to sandwiches or salads.  · Add meat, poultry, or seafood to rice, pasta, casseroles, salads, and soups.  · Use mayonnaise when making egg salad, chicken salad, or tuna salad.  · Use peanut butter as a topping for pretzels, celery, or crackers.  · Add beans to casseroles, dips, and spreads.  · Add pureed beans to sauces and soups.  · Replace calorie-free drinks with calorie-containing drinks, such as milk and fruit juice.  This information is not intended to replace advice given to you by your health care provider. Make sure you discuss any questions you have with your health care provider.  Document Released: 12/18/2006 Document Revised: 05/25/2017  Document Reviewed: 06/02/2015  [employer] Interactive Patient Education © 2018 Elsevier Inc.

## 2019-01-10 ENCOUNTER — TELEPHONE (OUTPATIENT)
Dept: NEUROLOGY | Age: 84
End: 2019-01-10

## 2019-01-31 ENCOUNTER — TELEPHONE (OUTPATIENT)
Dept: NEUROLOGY | Age: 84
End: 2019-01-31

## 2019-02-22 ENCOUNTER — OFFICE VISIT (OUTPATIENT)
Dept: NEUROLOGY | Age: 84
End: 2019-02-22
Payer: MEDICARE

## 2019-02-22 VITALS
HEART RATE: 53 BPM | HEIGHT: 64 IN | SYSTOLIC BLOOD PRESSURE: 99 MMHG | DIASTOLIC BLOOD PRESSURE: 63 MMHG | OXYGEN SATURATION: 98 % | BODY MASS INDEX: 16.9 KG/M2 | WEIGHT: 99 LBS

## 2019-02-22 DIAGNOSIS — R09.89 RIGHT CAROTID BRUIT: ICD-10-CM

## 2019-02-22 DIAGNOSIS — Z86.79 H/O CARDIAC MURMUR: ICD-10-CM

## 2019-02-22 DIAGNOSIS — G35 MULTIPLE SCLEROSIS (HCC): Primary | ICD-10-CM

## 2019-02-22 DIAGNOSIS — Z79.899 MEDICATION MANAGEMENT: ICD-10-CM

## 2019-02-22 PROCEDURE — 1123F ACP DISCUSS/DSCN MKR DOCD: CPT | Performed by: PHYSICIAN ASSISTANT

## 2019-02-22 PROCEDURE — 1090F PRES/ABSN URINE INCON ASSESS: CPT | Performed by: PHYSICIAN ASSISTANT

## 2019-02-22 PROCEDURE — 4004F PT TOBACCO SCREEN RCVD TLK: CPT | Performed by: PHYSICIAN ASSISTANT

## 2019-02-22 PROCEDURE — G8427 DOCREV CUR MEDS BY ELIG CLIN: HCPCS | Performed by: PHYSICIAN ASSISTANT

## 2019-02-22 PROCEDURE — G8400 PT W/DXA NO RESULTS DOC: HCPCS | Performed by: PHYSICIAN ASSISTANT

## 2019-02-22 PROCEDURE — G8419 CALC BMI OUT NRM PARAM NOF/U: HCPCS | Performed by: PHYSICIAN ASSISTANT

## 2019-02-22 PROCEDURE — G8599 NO ASA/ANTIPLAT THER USE RNG: HCPCS | Performed by: PHYSICIAN ASSISTANT

## 2019-02-22 PROCEDURE — 1101F PT FALLS ASSESS-DOCD LE1/YR: CPT | Performed by: PHYSICIAN ASSISTANT

## 2019-02-22 PROCEDURE — 99214 OFFICE O/P EST MOD 30 MIN: CPT | Performed by: PHYSICIAN ASSISTANT

## 2019-02-22 PROCEDURE — 4040F PNEUMOC VAC/ADMIN/RCVD: CPT | Performed by: PHYSICIAN ASSISTANT

## 2019-02-22 PROCEDURE — G8484 FLU IMMUNIZE NO ADMIN: HCPCS | Performed by: PHYSICIAN ASSISTANT

## 2019-03-13 ENCOUNTER — HOSPITAL ENCOUNTER (OUTPATIENT)
Dept: MRI IMAGING | Age: 84
Discharge: HOME | End: 2019-03-13
Payer: MEDICARE

## 2019-03-13 DIAGNOSIS — G35 MULTIPLE SCLEROSIS (HCC): ICD-10-CM

## 2019-03-13 LAB
GFR NON-AFRICAN AMERICAN: >60
PERFORMED ON: NORMAL
POC CREATININE: 0.8 MG/DL (ref 0.3–1.3)
POC SAMPLE TYPE: NORMAL

## 2019-03-13 PROCEDURE — A9577 INJ MULTIHANCE: HCPCS | Performed by: PHYSICIAN ASSISTANT

## 2019-03-13 PROCEDURE — 6360000004 HC RX CONTRAST MEDICATION: Performed by: PHYSICIAN ASSISTANT

## 2019-03-13 PROCEDURE — 70553 MRI BRAIN STEM W/O & W/DYE: CPT

## 2019-03-13 PROCEDURE — 82565 ASSAY OF CREATININE: CPT

## 2019-03-13 RX ADMIN — GADOBENATE DIMEGLUMINE 9 ML: 529 INJECTION, SOLUTION INTRAVENOUS at 15:57

## 2019-03-20 ENCOUNTER — TELEPHONE (OUTPATIENT)
Dept: NEUROLOGY | Age: 84
End: 2019-03-20

## 2019-04-04 ENCOUNTER — TELEPHONE (OUTPATIENT)
Dept: PODIATRY | Facility: CLINIC | Age: 84
End: 2019-04-04

## 2019-04-04 NOTE — TELEPHONE ENCOUNTER
Called and reminded patient's emergency contact of appointment with Dr. Enoc Mccormick on April 5, 2019 at 10:00 am. Patient gave verbal confirmation of appointment at this time.

## 2019-04-05 ENCOUNTER — OFFICE VISIT (OUTPATIENT)
Dept: PODIATRY | Facility: CLINIC | Age: 84
End: 2019-04-05

## 2019-04-05 VITALS
OXYGEN SATURATION: 96 % | DIASTOLIC BLOOD PRESSURE: 70 MMHG | HEIGHT: 65 IN | BODY MASS INDEX: 15.99 KG/M2 | HEART RATE: 54 BPM | WEIGHT: 96 LBS | SYSTOLIC BLOOD PRESSURE: 128 MMHG

## 2019-04-05 DIAGNOSIS — Z72.0 TOBACCO ABUSE: ICD-10-CM

## 2019-04-05 DIAGNOSIS — B35.1 ONYCHOMYCOSIS: Primary | ICD-10-CM

## 2019-04-05 DIAGNOSIS — M79.672 FOOT PAIN, BILATERAL: ICD-10-CM

## 2019-04-05 DIAGNOSIS — M79.671 FOOT PAIN, BILATERAL: ICD-10-CM

## 2019-04-05 DIAGNOSIS — I73.9 PAD (PERIPHERAL ARTERY DISEASE) (HCC): ICD-10-CM

## 2019-04-05 DIAGNOSIS — Z79.02 ANTIPLATELET OR ANTITHROMBOTIC LONG-TERM USE: ICD-10-CM

## 2019-04-05 PROCEDURE — 99203 OFFICE O/P NEW LOW 30 MIN: CPT | Performed by: PODIATRIST

## 2019-04-05 PROCEDURE — 11721 DEBRIDE NAIL 6 OR MORE: CPT | Performed by: PODIATRIST

## 2019-04-05 NOTE — PATIENT INSTRUCTIONS
Ankle-Brachial Index Test  Why am I having this test?  The ankle-brachial index (JC) test is used to diagnose peripheral vascular disease (PVD). PVD is also known as peripheral arterial disease (PAD). PVD is the blocking or hardening of the arteries anywhere within the circulatory system beyond the heart. PVD is caused by:  · Cholesterol deposits in your blood vessels (atherosclerosis). This is the most common cause of this condition.  · Irritation and swelling (inflammation) in the blood vessels.  · Blood clots in the vessels.    Cholesterol deposits cause arteries to narrow. Normal delivery of oxygen to your tissues is affected, causing muscle pain and fatigue. This is called claudication.  PVD means that there may also be a buildup of cholesterol:  · In your heart. This increases the risk of heart attacks.  · In your brain. This increases the risk of strokes.    What is being tested?  The ankle-brachial index test measures the blood flow in your arms and legs. The blood flow will show if blood vessels in your legs have been narrowed by cholesterol deposits.  How do I prepare for this test?  · Wear loose clothing.  · Do not use any tobacco products, including cigarettes, chewing tobacco, or e-cigarettes, for at least 30 minutes before the test.  What happens during the test?  1. You will lie down in a resting position.  2. Your health care provider will use a blood pressure machine and a small ultrasound device (Doppler) to measure the systolic pressures on your upper arms and ankles. Systolic pressure is the pressure inside your arteries when your heart pumps.  3. Systolic pressure measurements will be taken several times, and at several points, on both the ankle and the arm.  4. Your health care provider will divide the highest systolic pressure of the ankle by the highest systolic pressure of the arm. The result is the ankle-brachial pressure ratio, or JC.  Sometimes this test will be repeated after you have  exercised on a treadmill for 5 minutes. You may have leg pain during the exercise portion of the test if you suffer from PVD. If the index number drops after exercise, this may show that PVD is present.  How are the results reported?  Your test results will be reported as a value that shows the ratio of your ankle pressure to your arm pressure (JC ratio). Your health care provider will compare your results to normal ranges that were established after testing a large group of people (reference ranges). Reference ranges may vary among labs and hospitals. For this test, a common reference range is:  · JC ratio of 0.9 to 1.3.    What do the results mean?  An JC ratio that is below the reference range is considered abnormal and may indicate PVD in the legs.  Talk with your health care provider about what your results mean.  Questions to ask your health care provider  Ask your health care provider, or the department that is doing the test:  · When will my results be ready?  · How will I get my results?  · What are my treatment options?  · What other tests do I need?  · What are my next steps?    Summary  · The ankle-brachial index (JC) test is used to diagnose peripheral vascular disease (PVD). PVD is also known as peripheral arterial disease (PAD).  · The ankle-brachial index test measures the blood flow in your arms and legs.  · The highest systolic pressure of the ankle is divided by the highest systolic pressure of the arm. The result is the JC ratio.  · An JC ratio that is below 0.9 is considered abnormal and may indicate PVD in the legs.  This information is not intended to replace advice given to you by your health care provider. Make sure you discuss any questions you have with your health care provider.  Document Released: 12/22/2005 Document Revised: 09/11/2018 Document Reviewed: 09/11/2018  Elsevier Interactive Patient Education © 2019 Else[employer] Inc.    Peripheral Vascular Disease  Peripheral vascular  disease (PVD) is a disease of the blood vessels that are not part of your heart and brain. A simple term for PVD is poor circulation. In most cases, PVD narrows the blood vessels that carry blood from your heart to the rest of your body. This can reduce the supply of blood to your arms, legs, and internal organs, like your stomach or kidneys. However, PVD most often affects a person’s lower legs and feet. Without treatment, PVD tends to get worse.  PVD can also lead to acute ischemic limb. This is when an arm or leg suddenly cannot get enough blood. This is a medical emergency.  Follow these instructions at home:  Lifestyle  · Do not use any products that contain nicotine or tobacco, such as cigarettes and e-cigarettes. If you need help quitting, ask your doctor.  · Lose weight if you are overweight. Or, stay at a healthy weight as told by your doctor.  · Eat a diet that is low in fat and cholesterol. If you need help, ask your doctor.  · Exercise regularly. Ask your doctor for activities that are right for you.  General instructions  · Take over-the-counter and prescription medicines only as told by your doctor.  · Take good care of your feet:  ? Wear comfortable shoes that fit well.  ? Check your feet often for any cuts or sores.  · Keep all follow-up visits as told by your doctor This is important.  Contact a doctor if:  · You have cramps in your legs when you walk.  · You have leg pain when you are at rest.  · You have coldness in a leg or foot.  · Your skin changes.  · You are unable to get or have an erection (erectile dysfunction).  · You have cuts or sores on your feet that do not heal.  Get help right away if:  · Your arm or leg turns cold, numb, and blue.  · Your arms or legs become red, warm, swollen, painful, or numb.  · You have chest pain.  · You have trouble breathing.  · You suddenly have weakness in your face, arm, or leg.  · You become very confused or you cannot speak.  · You suddenly have a very  bad headache.  · You suddenly cannot see.  Summary  · Peripheral vascular disease (PVD) is a disease of the blood vessels.  · A simple term for PVD is poor circulation. Without treatment, PVD tends to get worse.  · Treatment may include exercise, low fat and low cholesterol diet, and quitting smoking.  This information is not intended to replace advice given to you by your health care provider. Make sure you discuss any questions you have with your health care provider.  Document Released: 03/14/2011 Document Revised: 01/25/2018 Document Reviewed: 01/25/2018  [employer] Interactive Patient Education © 2019 [employer]

## 2019-04-23 ENCOUNTER — OFFICE VISIT (OUTPATIENT)
Dept: VASCULAR SURGERY | Age: 84
End: 2019-04-23
Payer: MEDICARE

## 2019-04-23 VITALS
OXYGEN SATURATION: 95 % | DIASTOLIC BLOOD PRESSURE: 64 MMHG | RESPIRATION RATE: 18 BRPM | HEART RATE: 52 BPM | SYSTOLIC BLOOD PRESSURE: 106 MMHG

## 2019-04-23 DIAGNOSIS — I73.9 PVD (PERIPHERAL VASCULAR DISEASE) (HCC): ICD-10-CM

## 2019-04-23 DIAGNOSIS — I70.219 ATHEROSCLEROSIS OF NATIVE ARTERY OF EXTREMITY WITH INTERMITTENT CLAUDICATION, UNSPECIFIED EXTREMITY (HCC): Primary | ICD-10-CM

## 2019-04-23 PROCEDURE — 1090F PRES/ABSN URINE INCON ASSESS: CPT | Performed by: PHYSICIAN ASSISTANT

## 2019-04-23 PROCEDURE — 4040F PNEUMOC VAC/ADMIN/RCVD: CPT | Performed by: PHYSICIAN ASSISTANT

## 2019-04-23 PROCEDURE — G8427 DOCREV CUR MEDS BY ELIG CLIN: HCPCS | Performed by: PHYSICIAN ASSISTANT

## 2019-04-23 PROCEDURE — G8599 NO ASA/ANTIPLAT THER USE RNG: HCPCS | Performed by: PHYSICIAN ASSISTANT

## 2019-04-23 PROCEDURE — 99213 OFFICE O/P EST LOW 20 MIN: CPT | Performed by: PHYSICIAN ASSISTANT

## 2019-04-23 PROCEDURE — 1123F ACP DISCUSS/DSCN MKR DOCD: CPT | Performed by: PHYSICIAN ASSISTANT

## 2019-04-23 PROCEDURE — G8400 PT W/DXA NO RESULTS DOC: HCPCS | Performed by: PHYSICIAN ASSISTANT

## 2019-04-23 PROCEDURE — 4004F PT TOBACCO SCREEN RCVD TLK: CPT | Performed by: PHYSICIAN ASSISTANT

## 2019-04-23 PROCEDURE — G8419 CALC BMI OUT NRM PARAM NOF/U: HCPCS | Performed by: PHYSICIAN ASSISTANT

## 2019-04-23 NOTE — PROGRESS NOTES
Patient Care Team:  Hal Echeverria MD as PCP - General (Family Medicine)  PARVEZ London MD (Cardiology)  Taina Hunter MD as Neurologist (Neurology)  CHRIS Reagan as Neurologist (Physician Assistant Medical)      History and Physical    Mrs. Kip Sewell is a 81 yo female who has a has prior history of peripheral vascular, carotid artery stenosis and a AAA. Current medication include Plavix and a statin daily. She was last ssen in our office on 4/27/16. She comes in today for follow up. She reports that she has been seeing Dr. Cyrus Joe for Podiatry care. She has a callus scabbed area on the left great toe that has been present approximately 2 weeks. Old records have been obtained, reviewed, and summarized.     Jim Craft is a 80 y.o. female with the following history reviewed and recorded in [facility]:  Patient Active Problem List    Diagnosis Date Noted    PVD (peripheral vascular disease) ([facility] [address] 04/23/2019    H/O cardiac murmur 02/22/2019    Medication management 02/22/2019    Status post insertion of drug-eluting stent into left anterior descending artery 11/29/2018     (2) KELY to LAD  12/22/15      Altered mental state 06/23/2018    Tremor 06/23/2018    Fatigue     Coronary artery disease involving native coronary artery of native heart without angina pectoris 09/13/2017    Vascular disease 09/22/2016    2 KELY to LAD 09/22/2016    Stenosis of right carotid artery     Acute ST elevation myocardial infarction (STEMI) involving left anterior descending (LAD) coronary artery (HCC)     Chronic respiratory failure ([facility] [address] 06/04/2015    Right carotid bruit 10/13/2014     Followed by Vascular      Smoker 10/13/2014    Carotid artery stenosis 03/25/2013    AAA (abdominal aortic aneurysm) without rupture (HCC) 03/25/2013    Multiple sclerosis (HCC)     Seizure disorder (HCC)     Cerebrovascular disease     Hyperlipidemia     Hypertension     Hypothyroidism     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)      Current Outpatient Medications   Medication Sig Dispense Refill    potassium chloride (KLOR-CON M) 20 MEQ TBCR extended release tablet Take 1 tablet by mouth daily 30 tablet 5    ibuprofen (ADVIL;MOTRIN) 600 MG tablet Take 1 tablet by mouth every 8 hours as needed for Pain 90 tablet 3    donepezil (ARICEPT ODT) 10 MG disintegrating tablet Take 10 mg by mouth nightly      furosemide (LASIX) 40 MG tablet Take 40 mg by mouth daily      IRON PO Take by mouth      B Complex Vitamins (VITAMIN B COMPLEX PO) Take by mouth      losartan (COZAAR) 50 MG tablet TAKE 1 TABLET DAILY 90 tablet 1    amLODIPine (NORVASC) 10 MG tablet TAKE 1 TABLET DAILY 90 tablet 1    levalbuterol (XOPENEX HFA) 45 MCG/ACT inhaler Inhale 1-2 puffs into the lungs every 4 hours as needed for Wheezing      tiotropium (SPIRIVA) 18 MCG inhalation capsule Inhale 18 mcg into the lungs daily      metoprolol succinate (TOPROL XL) 100 MG extended release tablet TAKE 1 TABLET DAILY 90 tablet 3    clopidogrel (PLAVIX) 75 MG tablet Take 1 tablet by mouth daily Indications: Cadmium Poisoning 30 tablet 5    nitroGLYCERIN (NITROSTAT) 0.4 MG SL tablet Place 1 tablet under the tongue every 5 minutes as needed for Chest pain. 25 tablet 3    esomeprazole (NEXIUM) 40 MG capsule Take 40 mg by mouth every morning (before breakfast).  atorvastatin (LIPITOR) 10 MG tablet Take 10 mg by mouth daily.  Levothyroxine Sodium (SYNTHROID PO) Take 0.1 mcg by mouth.  pregabalin (LYRICA) 75 MG capsule Take 75 mg by mouth nightly. No current facility-administered medications for this visit. Allergies: Morphine  Past Medical History:   Diagnosis Date    AAA (abdominal aortic aneurysm) without rupture ([facility] [address] 3/25/2013    Arteriosclerotic heart disease     Coronary artery disease with LAD x2 drug-eluting stents.      Arthritis     Atherosclerosis of native arteries of the extremities with intermittent claudication     Atherosclerosis of native arteries of the extremities with intermittent claudication     CAD (coronary artery disease) 2009    Carotid artery occlusion 2009    Cerebrovascular disease     Heart attack ([address] 12/11/2015    History of blood transfusion     History of cardiac murmur     Hyperlipidemia 2009    Cholesterol management per Livier Cortez Hash Hypertension 2009    Hypothyroidism     Multiple sclerosis ([facility] [address]     Seizure disorder ([facility] [address]     Possible    Thyroid disease 2009    Tobacco abuse, continuous 2009     Past Surgical History:   Procedure Laterality Date    ABDOMINAL AORTIC ANEURYSM REPAIR  [facility]/12.14.09    5.2 CM AAA; 14MM INTERPOSITIONAL DACRON GRAFT    APPENDECTOMY      CARDIAC CATHETERIZATION  06/11/2002    EF60%     CARDIAC CATHETERIZATION  6/17/15  [address]    EF 70%    CARDIAC CATHETERIZATION  12/11/2015    EF 60%, x2 drug-eluting stents LAD.  CARDIAC VALVE SURGERY  [facility]/12.11.09    & R/O    CAROTID ENDARTERECTOMY Right 1/11/2016    CAROTID ENDARTERECTOMY WITH EEG  performed by Scott Galeas MD at [address] [place] [address]      DIAGNOSTIC CARDIAC CATH LAB PROCEDURE  6/2/03    EF over 60% Normal LV function and hemodynamics, Mild nonocclusive CAD, w/o hemodynamically significant  lesions  identified     DIAGNOSTIC CARDIAC CATH LAB PROCEDURE  2/24/12    2 stents    ENDOSCOPY, COLON, DIAGNOSTIC      EYE SURGERY Bilateral     cataracts    FOOT SURGERY Right     exc. lesion/bx.     FRACTURE SURGERY Right     elbow    OVARIAN CYST SURGERY      THYROID SURGERY      VASCULAR SURGERY  05- [facility]    Percutaneous Transluminal Arterial Angioplasty of left superficial femoral artery using a 6*2 cutting balloon reducing a 90% stenosis to 0% stenosis    VASCULAR SURGERY  12/11/2015 [facility]    Procedure: aborted induction for carotis endarterectomy    VASCULAR SURGERY  1/11/2015 [facility]    Procedure: right CEA, eversion technique     Family History   Problem Relation Age of Onset    Heart Attack Mother     Heart Attack Father     Coronary Art Dis Father     Other Other         AAA/2009    Other Sister         AAA/2009     Social History     Tobacco Use    Smoking status: Current Every Day Smoker     Packs/day: 2.00     Years: 50.00     Pack years: 100.00    Smokeless tobacco: Never Used   Substance Use Topics    Alcohol use: No       Review of Systems    Constitutional - no significant activity change, appetite change, or unexpected weight change. No fever or chills. No diaphoresis or significant fatigue. HENT - no significant rhinorrhea or epistaxis. No tinnitus or significant hearing loss. Eyes - no sudden vision change or amaurosis. Respiratory - no significant shortness of breath, wheezing, or stridor. No apnea, cough, or chest tightness associated with shortness of breath. Cardiovascular - no chest pain, syncope, or significant dizziness. No palpitations or significant leg swelling. Patient reports no claudication. Gastrointestinal - no abdominal swelling or pain. No blood in stool. No severe constipation, diarrhea, nausea, or vomiting. Genitourinary - No difficulty urinating, dysuria, frequency, or urgency. No flank pain or hematuria. Musculoskeletal - no back pain, gait disturbance, or myalgia. Skin - no color change, rash, pallor. Callus/scabbed area left great toe (dorsal aspect) She has a hammer toe  Neurologic - no dizziness, facial asymmetry, or light headedness. No seizures. No speech difficulty or lateralizing weakness. Hematologic - no easy bruising or excessive bleeding. Psychiatric - no severe anxiety or nervousness. No confusion. All other review of systems are negative. Physical Exam    /64 (Site: Left Upper Arm, Position: Sitting, Cuff Size: Medium Adult)   Pulse 52   Resp 18   SpO2 95%     Constitutional - well developed, well nourished. No diaphoresis or acute distress.   HENT - head normocephalic. Right external ear canal appears normal.  Left external ear canal appears normal.  Septum appears midline. Eyes - conjunctiva normal.  EOMS normal.  No exudate. No icterus. Neck- ROM appears normal, no tracheal deviation. Cardiovascular - Regular rate and rhythm. Heart sounds are normal.  No murmur, rub, or gallop. Carotid pulses are 2+ to palpation bilaterally without bruit. Extremities - Radial and brachial pulses are 2+ to palpation bilaterally. Femoral pulses 2+. DP and PT pulses are not palpable. She has a callus/scabbed area on the left great toe, she has hammer toes. She has thick hyperkeratotic yellow discolored toenails. No cyanosis, clubbing, or significant edema. No signs atheroembolic event. Pulmonary - effort appears normal.  No respiratory distress. Lungs - Breath sounds normal. No wheezes or rales. GI - Abdomen - soft, non tender, bowel sounds X 4 quadrants. No guarding or rebound tenderness. No distension or palpable mass. Genitourinary - deferred. Musculoskeletal - ROM appears normal.  No significant edema. Neurologic - alert and oriented X 3. Physiologic. Skin - warm, dry, and intact. No rash, erythema, or pallor. Psychiatric - mood, affect, and behavior appear normal.  Judgment and thought processes appear normal.    Risk factors for atherosclerosis of all vascular beds have been reviewed with the patient including:  Family history, tobacco abuse in all forms, elevated cholesterol, hyperlipidemia, and diabetes. Assessment    1.  PVD (peripheral vascular disease) ([address]          Plan      Recommend continue Plavix 75 mg daily  Strongly encourage she continue statin therapy  Good skin care/checks daily  Recommend no smoking   Recommend she continue Podiatry care with Dr. Ashia Daigle  We will obtain a LELIA and call her son raymond with the results and further recommendation

## 2019-04-24 ENCOUNTER — NURSE TRIAGE (OUTPATIENT)
Dept: CALL CENTER | Facility: HOSPITAL | Age: 84
End: 2019-04-24

## 2019-04-24 ENCOUNTER — HOSPITAL ENCOUNTER (EMERGENCY)
Age: 84
Discharge: HOME | End: 2019-04-24
Payer: MEDICARE

## 2019-04-24 VITALS
WEIGHT: 102 LBS | HEART RATE: 58 BPM | HEIGHT: 64 IN | TEMPERATURE: 98.3 F | DIASTOLIC BLOOD PRESSURE: 51 MMHG | SYSTOLIC BLOOD PRESSURE: 134 MMHG | BODY MASS INDEX: 17.42 KG/M2 | OXYGEN SATURATION: 92 % | RESPIRATION RATE: 14 BRPM

## 2019-04-24 DIAGNOSIS — L30.4 INTERTRIGO: Primary | ICD-10-CM

## 2019-04-24 LAB
ALBUMIN SERPL-MCNC: 3.7 G/DL (ref 3.5–5.2)
ALP BLD-CCNC: 80 U/L (ref 35–104)
ALT SERPL-CCNC: 5 U/L (ref 5–33)
ANION GAP SERPL CALCULATED.3IONS-SCNC: 11 MMOL/L (ref 7–19)
AST SERPL-CCNC: 12 U/L (ref 5–32)
BACTERIA: NEGATIVE /HPF
BASOPHILS ABSOLUTE: 0.1 K/UL (ref 0–0.2)
BASOPHILS RELATIVE PERCENT: 0.5 % (ref 0–1)
BILIRUB SERPL-MCNC: <0.2 MG/DL (ref 0.2–1.2)
BILIRUBIN URINE: NEGATIVE
BLOOD, URINE: NEGATIVE
BUN BLDV-MCNC: 13 MG/DL (ref 8–23)
CALCIUM SERPL-MCNC: 8.8 MG/DL (ref 8.8–10.2)
CHLORIDE BLD-SCNC: 101 MMOL/L (ref 98–111)
CLARITY: CLEAR
CO2: 25 MMOL/L (ref 22–29)
COLOR: YELLOW
CREAT SERPL-MCNC: 0.5 MG/DL (ref 0.5–0.9)
EOSINOPHILS ABSOLUTE: 0.3 K/UL (ref 0–0.6)
EOSINOPHILS RELATIVE PERCENT: 2.5 % (ref 0–5)
EPITHELIAL CELLS, UA: 1 /HPF (ref 0–5)
GFR NON-AFRICAN AMERICAN: >60
GLUCOSE BLD-MCNC: 110 MG/DL (ref 74–109)
GLUCOSE URINE: NEGATIVE MG/DL
HCT VFR BLD CALC: 35.2 % (ref 37–47)
HEMOGLOBIN: 11.1 G/DL (ref 12–16)
HYALINE CASTS: 0 /HPF (ref 0–8)
KETONES, URINE: NEGATIVE MG/DL
LEUKOCYTE ESTERASE, URINE: NEGATIVE
LYMPHOCYTES ABSOLUTE: 1.2 K/UL (ref 1.1–4.5)
LYMPHOCYTES RELATIVE PERCENT: 11.2 % (ref 20–40)
MCH RBC QN AUTO: 31 PG (ref 27–31)
MCHC RBC AUTO-ENTMCNC: 31.5 G/DL (ref 33–37)
MCV RBC AUTO: 98.3 FL (ref 81–99)
MONOCYTES ABSOLUTE: 1.1 K/UL (ref 0–0.9)
MONOCYTES RELATIVE PERCENT: 11 % (ref 0–10)
NEUTROPHILS ABSOLUTE: 7.5 K/UL (ref 1.5–7.5)
NEUTROPHILS RELATIVE PERCENT: 73.4 % (ref 50–65)
NITRITE, URINE: NEGATIVE
PDW BLD-RTO: 13.9 % (ref 11.5–14.5)
PH UA: 6.5 (ref 5–8)
PLATELET # BLD: 212 K/UL (ref 130–400)
PMV BLD AUTO: 9.8 FL (ref 9.4–12.3)
POTASSIUM SERPL-SCNC: 3.7 MMOL/L (ref 3.5–5)
PROTEIN UA: 30 MG/DL
RBC # BLD: 3.58 M/UL (ref 4.2–5.4)
RBC UA: 4 /HPF (ref 0–4)
SODIUM BLD-SCNC: 137 MMOL/L (ref 136–145)
SPECIFIC GRAVITY UA: 1.02 (ref 1–1.03)
TOTAL PROTEIN: 6.7 G/DL (ref 6.6–8.7)
URINE REFLEX TO CULTURE: ABNORMAL
UROBILINOGEN, URINE: 0.2 E.U./DL
WBC # BLD: 10.2 K/UL (ref 4.8–10.8)
WBC UA: 0 /HPF (ref 0–5)

## 2019-04-24 PROCEDURE — 85025 COMPLETE CBC W/AUTO DIFF WBC: CPT

## 2019-04-24 PROCEDURE — 80053 COMPREHEN METABOLIC PANEL: CPT

## 2019-04-24 PROCEDURE — 99283 EMERGENCY DEPT VISIT LOW MDM: CPT | Performed by: NURSE PRACTITIONER

## 2019-04-24 PROCEDURE — 99282 EMERGENCY DEPT VISIT SF MDM: CPT

## 2019-04-24 PROCEDURE — 81001 URINALYSIS AUTO W/SCOPE: CPT

## 2019-04-24 PROCEDURE — 36415 COLL VENOUS BLD VENIPUNCTURE: CPT

## 2019-04-24 RX ORDER — NYSTATIN 100000 [USP'U]/G
POWDER TOPICAL
Qty: 60 G | Refills: 0 | Status: SHIPPED | OUTPATIENT
Start: 2019-04-24

## 2019-04-24 ASSESSMENT — ENCOUNTER SYMPTOMS
RHINORRHEA: 0
SHORTNESS OF BREATH: 0
TROUBLE SWALLOWING: 0
NAUSEA: 0
ABDOMINAL PAIN: 0
DIARRHEA: 0
CONSTIPATION: 0
SORE THROAT: 0
SINUS PRESSURE: 0
VOMITING: 0
COUGH: 0

## 2019-04-24 NOTE — TELEPHONE ENCOUNTER
Has red rash with pustles on thighs  And vaginal area, started yesterday, has foul smelling urine. Granddaughter thinks may be yeast. Coming to ED of choice    Reason for Disposition  • SEVERE itching (i.e., interferes with sleep, normal activities or school)    Additional Information  • Negative: Chickenpox suspected  • Negative: Sunburn suspected  • Negative: Swimmer's Itch suspected  • Negative: Insect bites suspected  • Negative: Hives suspected  • Negative: Poison ivy or oak or sumac suspected  • Negative: Ringworm suspected  • Negative: Impetigo suspected  • Negative: Athlete's Foot suspected  • Negative: Jock Itch suspected  • Negative: Wound infection suspected  • Negative: Rash of penis or scrotum  • Negative: Small spot, skin growth, or mole  • Negative: [1] Widespread rash AND [2] began after starting a medicine OR within 3 days of stopping it  • Negative: [1] Localized rash AND [2] cause unknown  • [1] Widespread rash AND [2] cause unknown  • Negative: [1] Life-threatening reaction (anaphylaxis) in the past to similar substance (e.g., food, insect bite/sting, chemical, etc.) AND [2] < 2 hours since exposure  • Negative: [1] Sudden onset of rash (within last 2 hours) AND [2] difficulty with breathing or swallowing  • Negative: Shock suspected (e.g., cold/pale/clammy skin, too weak to stand, low BP, rapid pulse)  • Negative: Difficult to awaken or acting confused (e.g., disoriented, slurred speech)  • Negative: [1] Purple or blood-colored spots or dots AND [2] fever  • Negative: Sounds like a life-threatening emergency to the triager  • Negative: Insect bites suspected  • Negative: Swimmer's Itch suspected  • Negative: Sunburn suspected  • Negative: Hives suspected  • Negative: [1] Chickenpox suspected AND [2] known exposure to chickenpox in past 3 weeks  • Negative: [1] Drug rash suspected AND [2] started taking new medicine within last 2 weeks(Exception: antihistamine, eye drops, ear drops, decongestant  "or other OTC cough/cold medicines)  • Negative: [1] Widespread rash AND [2] bright red, sunburn-like AND [3] current tampon use or nasal packing  • Negative: [1] Widespread rash AND [2] bright red, sunburn-like AND [3] wound infection or recent surgery  • Negative: [1] Bright red skin AND [2] peels off in sheets  • Negative: Stiff neck (unable to touch chin to chest)  • Negative: Fever  • Negative: Joint pain or swelling  • Negative: Rash looks like large or small blisters (i.e., fluid filled bubbles or sacs on the skin)  • Negative: Patient sounds very sick or weak to the triager  • Negative: [1] Purple or blood-colored rash (spots or dots) AND [2] no fever AND [3] sounds well to triager  • Negative: Sores in mouth  • Negative: Face becomes swollen  • Negative: [1] Headache AND [2] no fever  • Negative: Sore throat  • Negative: Ring-like appearance of rash (or ask: does it look like a  \"target\" or \"bulls- eye\")  • Negative: Pregnant    Answer Assessment - Initial Assessment Questions  1.) CALLER DIAGNOSIS: \"What do you think is causing the rash?\" (e.g., Chickenpox, Hives, Impetigo, Athlete's Foot, etc.)   2.) LOCATION:  \"Is it widespread or localized?\"   3.) NEW MEDICATIONS: \"Are you taking any new medicine?\"  Caller thinks may be yeast, thighs, vaginal area, rash is red, with pustles. Started yesterday    Answer Assessment - Initial Assessment Questions  1. APPEARANCE of RASH: \"Describe the rash.\" (e.g., spots, blisters, raised areas, skin peeling, scaly)      red  2. SIZE: \"How big are the spots?\" (e.g., tip of pen, eraser, coin; inches, centimeters)      Thighs and vaginal area  3. LOCATION: \"Where is the rash located?\"      Thighs and vaginal  4. COLOR: \"What color is the rash?\" (Note: It is difficult to assess rash color in people with darker-colored skin. When this situation occurs, simply ask the caller to describe what they see.)      Red with pustles  5. ONSET: \"When did the rash begin?\"      yesterday  6. " "FEVER: \"Do you have a fever?\" If so, ask: \"What is your temperature, how was it measured, and when did it start?\"      no  7. ITCHING: \"Does the rash itch?\" If so, ask: \"How bad is the itch?\" (Scale 1-10; or mild, moderate, severe)      mild  8. CAUSE: \"What do you think is causing the rash?\"      Yeast and has foul smelling urine  9. MEDICATION FACTORS: \"Have you started any new medications within the last 2 weeks?\" (e.g., antibiotics) oint      oint  10. OTHER SYMPTOMS: \"Do you have any other symptoms?\" (e.g., dizziness, headache, sore throat, joint pain)   none        11. PREGNANCY: \"Is there any chance you are pregnant?\" \"When was your last menstrual period?\"  na    Protocols used: RASH OR REDNESS - WIDESPREAD-ADULT-AH, RASH - GUIDELINE SELECTION-ADULT-AH      "

## 2019-04-24 NOTE — ED PROVIDER NOTES
above in the HPI. PAST MEDICAL HISTORY     Past Medical History:   Diagnosis Date    AAA (abdominal aortic aneurysm) without rupture ([facility] [address] 3/25/2013    Arteriosclerotic heart disease     Coronary artery disease with LAD x2 drug-eluting stents.  Arthritis     Atherosclerosis of native arteries of the extremities with intermittent claudication     Atherosclerosis of native arteries of the extremities with intermittent claudication     CAD (coronary artery disease) 2009    Carotid artery occlusion 2009    Cerebrovascular disease     Heart attack ([facility] [address] 12/11/2015    History of blood transfusion     History of cardiac murmur     Hyperlipidemia 2009    Cholesterol management per Ginger Porter Hypertension 2009    Hypothyroidism     Multiple sclerosis ([facility] [address]     Seizure disorder ([facility] [address]     Possible    Thyroid disease 2009    Tobacco abuse, continuous 2009         SURGICAL HISTORY       Past Surgical History:   Procedure Laterality Date    ABDOMINAL AORTIC ANEURYSM REPAIR  [facility]/12.14.09    5.2 CM AAA; 14MM INTERPOSITIONAL DACRON GRAFT    APPENDECTOMY      CARDIAC CATHETERIZATION  06/11/2002    EF60%     CARDIAC [address]HETERIZATION  6/17/15  [address]    EF 70%    CARDIAC CATHETERIZATION  12/11/2015    EF 60%, x2 drug-eluting stents LAD.  CARDIAC VALVE SURGERY  [facility]/12.11.09    & R/O    CAROTID ENDARTERECTOMY Right 1/11/2016    CAROTID ENDARTERECTOMY WITH EEG  performed by Andre Cameron MD at [address] [place] [address]      DIAGNOSTIC CARDIAC CATH LAB PROCEDURE  6/2/03    EF over 60% Normal LV function and hemodynamics, Mild nonocclusive CAD, w/o hemodynamically significant  lesions  identified     DIAGNOSTIC CARDIAC CATH LAB PROCEDURE  2/24/12    2 stents    ENDOSCOPY, COLON, DIAGNOSTIC      EYE SURGERY Bilateral     cataracts    FOOT SURGERY Right     exc. lesion/bx.     FRACTURE SURGERY Right     elbow    OVARIAN CYST SURGERY      THYROID SURGERY      VASCULAR SURGERY  05- [facility] Percutaneous Transluminal Arterial Angioplasty of left superficial femoral artery using a 6*2 cutting balloon reducing a 90% stenosis to 0% stenosis    VASCULAR SURGERY  12/11/2015 [facility]    Procedure: aborted induction for carotis endarterectomy    VASCULAR SURGERY  1/11/2015 [facility]    Procedure: right CEA, eversion technique         CURRENT MEDICATIONS       Discharge Medication List as of 4/24/2019  7:38 PM      CONTINUE these medications which have NOT CHANGED    Details   potassium chloride (KLOR-CON M) 20 MEQ TBCR extended release tablet Take 1 tablet by mouth daily, Disp-30 tablet, R-5Normal      ibuprofen (ADVIL;MOTRIN) 600 MG tablet Take 1 tablet by mouth every 8 hours as needed for Pain, Disp-90 tablet, R-3Normal      donepezil (ARICEPT ODT) 10 MG disintegrating tablet Take 10 mg by mouth nightlyHistorical Med      furosemide (LASIX) 40 MG tablet Take 40 mg by mouth dailyHistorical Med      IRON PO Take by mouthHistorical Med      B Complex Vitamins (VITAMIN B COMPLEX PO) Take by mouthHistorical Med      losartan (COZAAR) 50 MG tablet TAKE 1 TABLET DAILY, Disp-90 tablet, R-1Normal      amLODIPine (NORVASC) 10 MG tablet TAKE 1 TABLET DAILY, Disp-90 tablet, R-1Normal      levalbuterol (XOPENEX HFA) 45 MCG/ACT inhaler Inhale 1-2 puffs into the lungs every 4 hours as needed for WheezingHistorical Med      tiotropium (SPIRIVA) 18 MCG inhalation capsule Inhale 18 mcg into the lungs dailyHistorical Med      metoprolol succinate (TOPROL XL) 100 MG extended release tablet TAKE 1 TABLET DAILY, Disp-90 tablet, R-3Normal      clopidogrel (PLAVIX) 75 MG tablet Take 1 tablet by mouth daily Indications: Cadmium Poisoning, Disp-30 tablet, R-5Normal      nitroGLYCERIN (NITROSTAT) 0.4 MG SL tablet Place 1 tablet under the tongue every 5 minutes as needed for Chest pain., Disp-25 tablet, R-3      esomeprazole (NEXIUM) 40 MG capsule Take 40 mg by mouth every morning (before breakfast).         atorvastatin (LIPITOR) 10 MG tablet Take 10 mg by mouth daily. Levothyroxine Sodium (SYNTHROID PO) Take 0.1 mcg by mouth.      pregabalin (LYRICA) 75 MG capsule Take 75 mg by mouth nightly. ALLERGIES     Morphine    FAMILY HISTORY       Family History   Problem Relation Age of Onset    Heart Attack Mother     Heart Attack Father     Coronary Art Dis Father     Other Other         AAA/2009    Other Sister         AAA/2009          SOCIAL HISTORY       Social History     Socioeconomic History    Marital status:       Spouse name: Not on file    Number of children: 3    Years of education: Not on file    Highest education level: Not on file   Occupational History     Employer: RETIRED   Social Needs    Financial resource strain: Not on file    Food insecurity:     Worry: Not on file     Inability: Not on file    Transportation needs:     Medical: Not on file     Non-medical: Not on file   Tobacco Use    Smoking status: Current Every Day Smoker     Packs/day: 2.00     Years: 50.00     Pack years: 100.00    Smokeless tobacco: Never Used   Substance and Sexual Activity    Alcohol use: No    Drug use: No    Sexual activity: Not on file   Lifestyle    Physical activity:     Days per week: Not on file     Minutes per session: Not on file    Stress: Not on file   Relationships    Social connections:     Talks on phone: Not on file     Gets together: Not on file     Attends Yarsanism service: Not on file     Active member of club or organization: Not on file     Attends meetings of clubs or organizations: Not on file     Relationship status: Not on file    Intimate partner violence:     Fear of current or ex partner: Not on file     Emotionally abused: Not on file     Physically abused: Not on file     Forced sexual activity: Not on file   Other Topics Concern    Not on file   Social History Narrative    Not on file       SCREENINGS    Magan Coma Scale  Eye Opening: Spontaneous  Best Verbal Response: (*)     All other components within normal limits   COMPREHENSIVE METABOLIC PANEL - Abnormal; Notable for the following components:    Glucose 110 (*)     All other components within normal limits   URINE RT REFLEX TO CULTURE - Abnormal; Notable for the following components:    Protein, UA 30 (*)     All other components within normal limits   MICROSCOPIC URINALYSIS       All other labs were within normal range or not returned as of this dictation. EMERGENCY DEPARTMENT COURSE and DIFFERENTIALDIAGNOSIS/MDM:   Vitals:    Vitals:    04/24/19 1620 04/24/19 1814 04/24/19 1848 04/24/19 1955   BP: (!) 108/55 (!) 139/51 (!) 131/50 (!) 134/51   Pulse: 57 53 55 58   Resp: 18 18 15 14   Temp: 98.8 °F (37.1 °C) 98.3 °F (36.8 °C)     TempSrc: Temporal Oral     SpO2: 94% 95% 93% 92%   Weight: 102 lb (46.3 kg)      Height: 5' 4\" (1.626 m)          MDM  Pt is medically stable and going to be discharge home. Family is agreeable with plan. CONSULTS:  None    PROCEDURES:  Unless otherwise notedbelow, none     Procedures    FINAL IMPRESSION     1. Intertrigo          DISPOSITION/PLAN   DISPOSITION Decision To Discharge 04/24/2019 07:36:44 PM      PATIENT REFERRED TO:  @FUP@    DISCHARGE MEDICATIONS:  Discharge Medication List as of 4/24/2019  7:38 PM      START taking these medications    Details   nystatin (MYCOSTATIN) 264617 UNIT/GM powder Apply topically 4 times daily. , Disp-60 g, R-0, Print                (Please note that portions of this note were completed with a voice recognition program.  Efforts were made to edit the dictations butoccasionally words are mis-transcribed.)    ALLEN Mcbride (electronically signed)  [address] [facility], [place]  04/24/19 2009

## 2019-04-25 NOTE — ED NOTES
Report given and care transferred to [facility], [facility], RN  04/24/19 [id number]

## 2019-04-25 NOTE — ED NOTES
José Miguel Miller at bedside talking with pt and family about discharge instructions.       Mis Reid RN  04/24/19 [id number]

## 2019-05-03 ENCOUNTER — HOSPITAL ENCOUNTER (OUTPATIENT)
Dept: VASCULAR LAB | Age: 84
Discharge: HOME | End: 2019-05-03
Payer: MEDICARE

## 2019-05-03 DIAGNOSIS — I73.9 PVD (PERIPHERAL VASCULAR DISEASE) (HCC): ICD-10-CM

## 2019-05-03 DIAGNOSIS — I70.219 ATHEROSCLEROSIS OF NATIVE ARTERY OF EXTREMITY WITH INTERMITTENT CLAUDICATION, UNSPECIFIED EXTREMITY (HCC): ICD-10-CM

## 2019-05-03 PROCEDURE — 93923 UPR/LXTR ART STDY 3+ LVLS: CPT

## 2019-05-06 ENCOUNTER — TELEPHONE (OUTPATIENT)
Dept: VASCULAR SURGERY | Age: 84
End: 2019-05-06

## 2019-05-06 NOTE — TELEPHONE ENCOUNTER
----- Message from Arin Cavazos PA-C sent at 5/6/2019  9:16 AM CDT -----  Please call patient's sontoni andrade and let him know I would like for them to come back in the to the office on a [facility] clinic day to re evaluate the wound on her left great toe and go over the arterial study.

## 2019-05-06 NOTE — TELEPHONE ENCOUNTER
Called and spoke with Osman Lopez to let him know the following information that Fernando Deng would like for them to come back in the to the office on a [facility] clinic day to re evaluate the wound on her left great toe and go over the arterial study. Osman Lopez patients son voiced understanding and said the appointment for May 15 at 10:00 would be great.

## 2019-05-14 ENCOUNTER — OFFICE VISIT (OUTPATIENT)
Dept: VASCULAR SURGERY | Age: 84
End: 2019-05-14
Payer: MEDICARE

## 2019-05-14 VITALS
RESPIRATION RATE: 18 BRPM | DIASTOLIC BLOOD PRESSURE: 53 MMHG | TEMPERATURE: 97.8 F | OXYGEN SATURATION: 95 % | SYSTOLIC BLOOD PRESSURE: 95 MMHG | HEART RATE: 69 BPM

## 2019-05-14 DIAGNOSIS — I70.244 ATHEROSCLEROSIS OF NATIVE ARTERY OF LEFT LOWER EXTREMITY WITH ULCERATION OF MIDFOOT (HCC): ICD-10-CM

## 2019-05-14 PROCEDURE — 1090F PRES/ABSN URINE INCON ASSESS: CPT | Performed by: PHYSICIAN ASSISTANT

## 2019-05-14 PROCEDURE — 4040F PNEUMOC VAC/ADMIN/RCVD: CPT | Performed by: PHYSICIAN ASSISTANT

## 2019-05-14 PROCEDURE — 1123F ACP DISCUSS/DSCN MKR DOCD: CPT | Performed by: PHYSICIAN ASSISTANT

## 2019-05-14 PROCEDURE — 99213 OFFICE O/P EST LOW 20 MIN: CPT | Performed by: PHYSICIAN ASSISTANT

## 2019-05-14 PROCEDURE — G8400 PT W/DXA NO RESULTS DOC: HCPCS | Performed by: PHYSICIAN ASSISTANT

## 2019-05-14 PROCEDURE — 4004F PT TOBACCO SCREEN RCVD TLK: CPT | Performed by: PHYSICIAN ASSISTANT

## 2019-05-14 PROCEDURE — G8419 CALC BMI OUT NRM PARAM NOF/U: HCPCS | Performed by: PHYSICIAN ASSISTANT

## 2019-05-14 PROCEDURE — G8427 DOCREV CUR MEDS BY ELIG CLIN: HCPCS | Performed by: PHYSICIAN ASSISTANT

## 2019-05-14 PROCEDURE — G8599 NO ASA/ANTIPLAT THER USE RNG: HCPCS | Performed by: PHYSICIAN ASSISTANT

## 2019-05-17 NOTE — PROGRESS NOTES
Patient Care Team:  Leo Amaral MD as PCP - General (Family Medicine)  PARVEZ Vazquez MD (Cardiology)  Shai Perez MD as Neurologist (Neurology)  CHRIS Ramos as Neurologist (Physician Assistant Medical)      History and Physical    Mrs. Luis Fernando Colvin is a 81 yo female who has a has prior history of peripheral vascular, carotid artery stenosis and a AAA. Current medication include Plavix and a statin daily. She was last ssen in our office on 4/27/16. She comes in today for follow up of wound and to be evaluated by Sandy Humphries. She reports that she has been seeing Dr. Ashia Daigle for Podiatry care. She has a callus scabbed area on the left great toe that has been present approximately 4 weeks. She reports at times she has pain in hr left foot that wakes her up and she usually has to get up and walk for a few minutes to get the pain to go away.      Sriram Zuñiga is a 80 y.o. 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 ([address] 05/14/2019    PVD (peripheral vascular disease) ([address] 04/23/2019    H/O cardiac murmur 02/22/2019    Medication management 02/22/2019    Status post insertion of drug-eluting stent into left anterior descending artery 11/29/2018     (2) KELY to LAD  12/22/15      Altered mental state 06/23/2018    Tremor 06/23/2018    Fatigue     Coronary artery disease involving native coronary artery of native heart without angina pectoris 09/13/2017    Vascular disease 09/22/2016    2 KELY to LAD 09/22/2016    Stenosis of right carotid artery     Acute ST elevation myocardial infarction (STEMI) involving left anterior descending (LAD) coronary artery (HCC)     Chronic respiratory failure ([address] 06/04/2015    Right carotid bruit 10/13/2014     Followed by Vascular      Smoker 10/13/2014    Carotid artery stenosis 03/25/2013  AAA [facility]abdominal aortic aneurysm) without rupture ([facility] 03/25/2013    Multiple sclerosis (HCC)     Seizure disorder ([facility]     Cerebrovascular disease     Hyperlipidemia     Hypertension     Hypothyroidism     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)      Current Outpatient Medications   Medication Sig Dispense Refill    nystatin (MYCOSTATIN) 174723 UNIT/GM powder Apply topically 4 times daily. 60 g 0    potassium chloride (KLOR-CON M) 20 MEQ TBCR extended release tablet Take 1 tablet by mouth daily 30 tablet 5    ibuprofen (ADVIL;MOTRIN) 600 MG tablet Take 1 tablet by mouth every 8 hours as needed for Pain 90 tablet 3    donepezil (ARICEPT ODT) 10 MG disintegrating tablet Take 10 mg by mouth nightly      furosemide (LASIX) 40 MG tablet Take 40 mg by mouth daily      IRON PO Take by mouth      B Complex Vitamins (VITAMIN B COMPLEX PO) Take by mouth      losartan (COZAAR) 50 MG tablet TAKE 1 TABLET DAILY 90 tablet 1    amLODIPine (NORVASC) 10 MG tablet TAKE 1 TABLET DAILY 90 tablet 1    levalbuterol (XOPENEX HFA) 45 MCG/ACT inhaler Inhale 1-2 puffs into the lungs every 4 hours as needed for Wheezing      tiotropium (SPIRIVA) 18 MCG inhalation capsule Inhale 18 mcg into the lungs daily      metoprolol succinate (TOPROL XL) 100 MG extended release tablet TAKE 1 TABLET DAILY 90 tablet 3    clopidogrel (PLAVIX) 75 MG tablet Take 1 tablet by mouth daily Indications: Cadmium Poisoning 30 tablet 5    nitroGLYCERIN (NITROSTAT) 0.4 MG SL tablet Place 1 tablet under the tongue every 5 minutes as needed for Chest pain. 25 tablet 3    esomeprazole (NEXIUM) 40 MG capsule Take 40 mg by mouth every morning (before breakfast).  atorvastatin (LIPITOR) 10 MG tablet Take 10 mg by mouth daily.  Levothyroxine Sodium (SYNTHROID PO) Take 0.1 mcg by mouth.  pregabalin (LYRICA) 75 MG capsule Take 75 mg by mouth nightly.        No current facility-administered Transluminal Arterial Angioplasty of left superficial femoral artery using a 6*2 cutting balloon reducing a 90% stenosis to 0% stenosis    VASCULAR SURGERY  12/11/2015 [facility]    Procedure: aborted induction for carotis endarterectomy    VASCULAR SURGERY  1/11/2015 [facility]    Procedure: right CEA, eversion technique     Family History   Problem Relation Age of Onset    Heart Attack Mother     Heart Attack Father     Coronary Art Dis Father     Other Other         AAA/2009    Other Sister         AAA/2009     Social History     Tobacco Use    Smoking status: Current Every Day Smoker     Packs/day: 2.00     Years: 50.00     Pack years: 100.00    Smokeless tobacco: Never Used   Substance Use Topics    Alcohol use: No       Review of Systems    Constitutional - no significant activity change, appetite change, or unexpected weight change. No fever or chills. No diaphoresis or significant fatigue. HENT - no significant rhinorrhea or epistaxis. No tinnitus or significant hearing loss. Eyes - no sudden vision change or amaurosis. Respiratory - no significant shortness of breath, wheezing, or stridor. No apnea, cough, or chest tightness associated with shortness of breath. Cardiovascular - no chest pain, syncope, or significant dizziness. No palpitations or significant leg swelling. Patient reports no claudication. Gastrointestinal - no abdominal swelling or pain. No blood in stool. No severe constipation, diarrhea, nausea, or vomiting. Genitourinary - No difficulty urinating, dysuria, frequency, or urgency. No flank pain or hematuria. Musculoskeletal - no back pain, gait disturbance, or myalgia. Skin - no color change, rash, pallor. Large Corn left great toe (dorsal aspect) She has a hammer toe  Neurologic - no dizziness, facial asymmetry, or light headedness. No seizures. No speech difficulty or lateralizing weakness. Hematologic - no easy bruising or excessive bleeding.   Psychiatric - no severe anxiety or nervousness. No confusion. All other review of systems are negative. Physical Exam    BP (!) 95/53 Comment: right  Pulse 69   Temp 97.8 °F (36.6 °C)   Resp 18   SpO2 95%     Constitutional - well developed, well nourished. No diaphoresis or acute distress. HENT - head normocephalic. Right external ear canal appears normal.  Left external ear canal appears normal.  Septum appears midline. Eyes - conjunctiva normal.  EOMS normal.  No exudate. No icterus. Neck- ROM appears normal, no tracheal deviation. Cardiovascular - Regular rate and rhythm. Heart sounds are normal.  No murmur, rub, or gallop. Carotid pulses are 2+ to palpation bilaterally without bruit. Extremities - Radial and brachial pulses are 2+ to palpation bilaterally. Femoral pulses 2+. DP and PT pulses are not palpable. She has a large corn on the left great toe with surrounding mild erythema. She has hammer toes. She has thick hyperkeratotic yellow discolored toenails. No cyanosis, clubbing, or significant edema. No signs atheroembolic event. Pulmonary - effort appears normal.  No respiratory distress. Lungs - Breath sounds normal. No wheezes or rales. GI - Abdomen - soft, non tender, bowel sounds X 4 quadrants. No guarding or rebound tenderness. No distension or palpable mass. Genitourinary - deferred. Musculoskeletal - ROM appears normal.  No significant edema. Neurologic - alert and oriented X 3. Physiologic. Skin - warm, dry, and intact. No rash, erythema, or pallor. Psychiatric - mood, affect, and behavior appear normal.  Judgment and thought processes appear normal.    Risk factors for atherosclerosis of all vascular beds have been reviewed with the patient including:  Family history, tobacco abuse in all forms, elevated cholesterol, hyperlipidemia, and diabetes.     Lower extremity arterial study:    Impression        The patient has moderately diminished ankle-brachial indices bilateral    lower extremity(ies) which would be consistent with claudication level    symptoms.    Based on segmental pressures and doppler waveforms, the patient likely has    disease in the bilateral superficial femoral, arterial segments.    Note: low toe pressures may indicate foot level occlusive disease and    severe decrease in flow to the forefeet.         Right CESAR 0.60, Left CESAR 0.63. Individual films reviewed: Yes. These results were reviewed with the patient. Assessment    1. 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 Kan Delong  Patient evaluate by Dr. Clarita Armstrong. Options were discussed with the patient including continued medical management versus proceeding with angiography and potential intervention for PVD with IRP. Patient has opted to proceed with angiography. Risks have been discussed with the patient including but not limited to MI, death, CVA, bleed, nerve injury, infection, contrast nephropathy, possible need for dialysis, and need for further surgery.       Proceed with aortogram with runoff possible angioplasty/atherectomy/stent

## 2019-05-25 ENCOUNTER — APPOINTMENT (OUTPATIENT)
Dept: CT IMAGING | Age: 84
DRG: 194 | End: 2019-05-25
Payer: MEDICARE

## 2019-05-25 ENCOUNTER — HOSPITAL ENCOUNTER (INPATIENT)
Age: 84
LOS: 3 days | Discharge: HOME | DRG: 194 | End: 2019-05-28
Attending: EMERGENCY MEDICINE | Admitting: FAMILY MEDICINE
Payer: MEDICARE

## 2019-05-25 ENCOUNTER — APPOINTMENT (OUTPATIENT)
Dept: GENERAL RADIOLOGY | Age: 84
DRG: 194 | End: 2019-05-25
Payer: MEDICARE

## 2019-05-25 DIAGNOSIS — J18.9 PNEUMONIA DUE TO ORGANISM: Primary | ICD-10-CM

## 2019-05-25 DIAGNOSIS — R91.8 LUNG INFILTRATE: ICD-10-CM

## 2019-05-25 DIAGNOSIS — R41.82 ALTERED MENTAL STATUS, UNSPECIFIED ALTERED MENTAL STATUS TYPE: ICD-10-CM

## 2019-05-25 DIAGNOSIS — I10 ESSENTIAL HYPERTENSION: ICD-10-CM

## 2019-05-25 LAB
ALBUMIN SERPL-MCNC: 3.1 G/DL (ref 3.5–5.2)
ALP BLD-CCNC: 87 U/L (ref 35–104)
ALT SERPL-CCNC: 6 U/L (ref 5–33)
ANION GAP SERPL CALCULATED.3IONS-SCNC: 12 MMOL/L (ref 7–19)
APTT: 27.6 SEC (ref 26–36.2)
AST SERPL-CCNC: 14 U/L (ref 5–32)
BACTERIA: NEGATIVE /HPF
BASOPHILS ABSOLUTE: 0 K/UL (ref 0–0.2)
BASOPHILS RELATIVE PERCENT: 0.4 % (ref 0–1)
BILIRUB SERPL-MCNC: <0.2 MG/DL (ref 0.2–1.2)
BILIRUBIN URINE: NEGATIVE
BLOOD, URINE: NEGATIVE
BUN BLDV-MCNC: 16 MG/DL (ref 8–23)
CALCIUM SERPL-MCNC: 8.5 MG/DL (ref 8.8–10.2)
CHLORIDE BLD-SCNC: 107 MMOL/L (ref 98–111)
CLARITY: CLEAR
CO2: 19 MMOL/L (ref 22–29)
COLOR: YELLOW
CREAT SERPL-MCNC: 0.7 MG/DL (ref 0.5–0.9)
EOSINOPHILS ABSOLUTE: 0.2 K/UL (ref 0–0.6)
EOSINOPHILS RELATIVE PERCENT: 1.8 % (ref 0–5)
EPITHELIAL CELLS, UA: 3 /HPF (ref 0–5)
GFR NON-AFRICAN AMERICAN: >60
GLUCOSE BLD-MCNC: 109 MG/DL (ref 74–109)
GLUCOSE URINE: NEGATIVE MG/DL
HCT VFR BLD CALC: 34.2 % (ref 37–47)
HEMOGLOBIN: 10.4 G/DL (ref 12–16)
HYALINE CASTS: 6 /HPF (ref 0–8)
INR BLD: 1.18 (ref 0.88–1.18)
KETONES, URINE: NEGATIVE MG/DL
LACTIC ACID: 1.5 MMOL/L (ref 0.5–1.9)
LEUKOCYTE ESTERASE, URINE: ABNORMAL
LYMPHOCYTES ABSOLUTE: 1.4 K/UL (ref 1.1–4.5)
LYMPHOCYTES RELATIVE PERCENT: 14.3 % (ref 20–40)
MCH RBC QN AUTO: 30.2 PG (ref 27–31)
MCHC RBC AUTO-ENTMCNC: 30.4 G/DL (ref 33–37)
MCV RBC AUTO: 99.4 FL (ref 81–99)
MONOCYTES ABSOLUTE: 0.8 K/UL (ref 0–0.9)
MONOCYTES RELATIVE PERCENT: 8.6 % (ref 0–10)
NEUTROPHILS ABSOLUTE: 7 K/UL (ref 1.5–7.5)
NEUTROPHILS RELATIVE PERCENT: 73.8 % (ref 50–65)
NITRITE, URINE: NEGATIVE
PDW BLD-RTO: 14.2 % (ref 11.5–14.5)
PH UA: 7 (ref 5–8)
PLATELET # BLD: 307 K/UL (ref 130–400)
PMV BLD AUTO: 10.1 FL (ref 9.4–12.3)
POTASSIUM SERPL-SCNC: 3.5 MMOL/L (ref 3.5–5)
PRO-BNP: 1667 PG/ML (ref 0–1800)
PROTEIN UA: ABNORMAL MG/DL
PROTHROMBIN TIME: 14.4 SEC (ref 12–14.6)
RBC # BLD: 3.44 M/UL (ref 4.2–5.4)
RBC UA: 3 /HPF (ref 0–4)
SODIUM BLD-SCNC: 138 MMOL/L (ref 136–145)
SPECIFIC GRAVITY UA: 1.02 (ref 1–1.03)
TOTAL PROTEIN: 6.4 G/DL (ref 6.6–8.7)
TROPONIN: <0.01 NG/ML (ref 0–0.03)
URINE REFLEX TO CULTURE: YES
UROBILINOGEN, URINE: 1 E.U./DL
WBC # BLD: 9.5 K/UL (ref 4.8–10.8)
WBC UA: 4 /HPF (ref 0–5)

## 2019-05-25 PROCEDURE — 85025 COMPLETE CBC W/AUTO DIFF WBC: CPT

## 2019-05-25 PROCEDURE — 93005 ELECTROCARDIOGRAM TRACING: CPT

## 2019-05-25 PROCEDURE — 1210000000 HC MED SURG R&B

## 2019-05-25 PROCEDURE — 83880 ASSAY OF NATRIURETIC PEPTIDE: CPT

## 2019-05-25 PROCEDURE — 36415 COLL VENOUS BLD VENIPUNCTURE: CPT

## 2019-05-25 PROCEDURE — 87040 BLOOD CULTURE FOR BACTERIA: CPT

## 2019-05-25 PROCEDURE — 99285 EMERGENCY DEPT VISIT HI MDM: CPT

## 2019-05-25 PROCEDURE — 70450 CT HEAD/BRAIN W/O DYE: CPT

## 2019-05-25 PROCEDURE — 96374 THER/PROPH/DIAG INJ IV PUSH: CPT

## 2019-05-25 PROCEDURE — 99285 EMERGENCY DEPT VISIT HI MDM: CPT | Performed by: EMERGENCY MEDICINE

## 2019-05-25 PROCEDURE — 2580000003 HC RX 258: Performed by: EMERGENCY MEDICINE

## 2019-05-25 PROCEDURE — 85610 PROTHROMBIN TIME: CPT

## 2019-05-25 PROCEDURE — 73630 X-RAY EXAM OF FOOT: CPT

## 2019-05-25 PROCEDURE — 85730 THROMBOPLASTIN TIME PARTIAL: CPT

## 2019-05-25 PROCEDURE — 71045 X-RAY EXAM CHEST 1 VIEW: CPT

## 2019-05-25 PROCEDURE — 80053 COMPREHEN METABOLIC PANEL: CPT

## 2019-05-25 PROCEDURE — 83605 ASSAY OF LACTIC ACID: CPT

## 2019-05-25 PROCEDURE — 81001 URINALYSIS AUTO W/SCOPE: CPT

## 2019-05-25 PROCEDURE — 84484 ASSAY OF TROPONIN QUANT: CPT

## 2019-05-25 PROCEDURE — 6360000002 HC RX W HCPCS: Performed by: EMERGENCY MEDICINE

## 2019-05-25 PROCEDURE — 87086 URINE CULTURE/COLONY COUNT: CPT

## 2019-05-25 RX ORDER — SODIUM CHLORIDE 0.9 % (FLUSH) 0.9 %
10 SYRINGE (ML) INJECTION PRN
Status: DISCONTINUED | OUTPATIENT
Start: 2019-05-25 | End: 2019-05-28 | Stop reason: HOSPADM

## 2019-05-25 RX ORDER — ACETAMINOPHEN 325 MG/1
650 TABLET ORAL EVERY 4 HOURS PRN
Status: DISCONTINUED | OUTPATIENT
Start: 2019-05-25 | End: 2019-05-28 | Stop reason: HOSPADM

## 2019-05-25 RX ORDER — SODIUM CHLORIDE 0.9 % (FLUSH) 0.9 %
10 SYRINGE (ML) INJECTION EVERY 12 HOURS SCHEDULED
Status: DISCONTINUED | OUTPATIENT
Start: 2019-05-25 | End: 2019-05-28 | Stop reason: HOSPADM

## 2019-05-25 RX ADMIN — Medication 500 MG: at 22:51

## 2019-05-25 RX ADMIN — CEFTRIAXONE 1 G: 1 INJECTION, POWDER, FOR SOLUTION INTRAMUSCULAR; INTRAVENOUS at 22:51

## 2019-05-25 ASSESSMENT — ENCOUNTER SYMPTOMS
NAUSEA: 0
SORE THROAT: 0
RHINORRHEA: 0
VOMITING: 0
BACK PAIN: 0
SHORTNESS OF BREATH: 0
COUGH: 1
ABDOMINAL PAIN: 0
DIARRHEA: 0

## 2019-05-25 ASSESSMENT — PAIN SCALES - GENERAL
PAINLEVEL_OUTOF10: 0
PAINLEVEL_OUTOF10: 0

## 2019-05-26 ENCOUNTER — APPOINTMENT (OUTPATIENT)
Dept: GENERAL RADIOLOGY | Age: 84
DRG: 194 | End: 2019-05-26
Payer: MEDICARE

## 2019-05-26 LAB
LV EF: 58 %
LVEF MODALITY: NORMAL

## 2019-05-26 PROCEDURE — 1210000000 HC MED SURG R&B

## 2019-05-26 PROCEDURE — 6370000000 HC RX 637 (ALT 250 FOR IP): Performed by: FAMILY MEDICINE

## 2019-05-26 PROCEDURE — 2580000003 HC RX 258: Performed by: FAMILY MEDICINE

## 2019-05-26 PROCEDURE — 71046 X-RAY EXAM CHEST 2 VIEWS: CPT

## 2019-05-26 PROCEDURE — 99223 1ST HOSP IP/OBS HIGH 75: CPT | Performed by: INTERNAL MEDICINE

## 2019-05-26 PROCEDURE — 93306 TTE W/DOPPLER COMPLETE: CPT

## 2019-05-26 PROCEDURE — 6360000002 HC RX W HCPCS: Performed by: FAMILY MEDICINE

## 2019-05-26 PROCEDURE — 2500000003 HC RX 250 WO HCPCS: Performed by: FAMILY MEDICINE

## 2019-05-26 RX ORDER — POTASSIUM CHLORIDE 750 MG/1
10 TABLET, EXTENDED RELEASE ORAL 2 TIMES DAILY
Status: DISCONTINUED | OUTPATIENT
Start: 2019-05-26 | End: 2019-05-27

## 2019-05-26 RX ORDER — LOSARTAN POTASSIUM 50 MG/1
50 TABLET ORAL DAILY
Status: DISCONTINUED | OUTPATIENT
Start: 2019-05-26 | End: 2019-05-28 | Stop reason: HOSPADM

## 2019-05-26 RX ORDER — AMLODIPINE BESYLATE 10 MG/1
10 TABLET ORAL DAILY
Status: DISCONTINUED | OUTPATIENT
Start: 2019-05-26 | End: 2019-05-27

## 2019-05-26 RX ORDER — ATORVASTATIN CALCIUM 20 MG/1
10 TABLET, FILM COATED ORAL DAILY
Status: DISCONTINUED | OUTPATIENT
Start: 2019-05-26 | End: 2019-05-28 | Stop reason: HOSPADM

## 2019-05-26 RX ORDER — CLOPIDOGREL BISULFATE 75 MG/1
75 TABLET ORAL DAILY
Status: DISCONTINUED | OUTPATIENT
Start: 2019-05-26 | End: 2019-05-28 | Stop reason: HOSPADM

## 2019-05-26 RX ORDER — PREGABALIN 75 MG/1
75 CAPSULE ORAL NIGHTLY
Status: DISCONTINUED | OUTPATIENT
Start: 2019-05-26 | End: 2019-05-28 | Stop reason: HOSPADM

## 2019-05-26 RX ORDER — PANTOPRAZOLE SODIUM 40 MG/1
40 TABLET, DELAYED RELEASE ORAL
Status: DISCONTINUED | OUTPATIENT
Start: 2019-05-27 | End: 2019-05-28 | Stop reason: HOSPADM

## 2019-05-26 RX ORDER — DONEPEZIL HYDROCHLORIDE 10 MG/1
10 TABLET, ORALLY DISINTEGRATING ORAL NIGHTLY
Status: DISCONTINUED | OUTPATIENT
Start: 2019-05-26 | End: 2019-05-28 | Stop reason: HOSPADM

## 2019-05-26 RX ORDER — LEVALBUTEROL TARTRATE 45 UG/1
2 AEROSOL, METERED ORAL EVERY 4 HOURS PRN
Status: DISCONTINUED | OUTPATIENT
Start: 2019-05-26 | End: 2019-05-28 | Stop reason: HOSPADM

## 2019-05-26 RX ORDER — NITROGLYCERIN 0.4 MG/1
0.4 TABLET SUBLINGUAL EVERY 5 MIN PRN
Status: DISCONTINUED | OUTPATIENT
Start: 2019-05-26 | End: 2019-05-28 | Stop reason: HOSPADM

## 2019-05-26 RX ORDER — FUROSEMIDE 10 MG/ML
20 INJECTION INTRAMUSCULAR; INTRAVENOUS 2 TIMES DAILY
Status: COMPLETED | OUTPATIENT
Start: 2019-05-26 | End: 2019-05-27

## 2019-05-26 RX ORDER — LEVOTHYROXINE SODIUM 0.1 MG/1
100 TABLET ORAL DAILY
Status: DISCONTINUED | OUTPATIENT
Start: 2019-05-26 | End: 2019-05-28 | Stop reason: HOSPADM

## 2019-05-26 RX ORDER — FUROSEMIDE 10 MG/ML
20 INJECTION INTRAMUSCULAR; INTRAVENOUS ONCE
Status: COMPLETED | OUTPATIENT
Start: 2019-05-26 | End: 2019-05-26

## 2019-05-26 RX ADMIN — FUROSEMIDE 20 MG: 10 INJECTION, SOLUTION INTRAMUSCULAR; INTRAVENOUS at 17:09

## 2019-05-26 RX ADMIN — PREGABALIN 75 MG: 75 CAPSULE ORAL at 22:09

## 2019-05-26 RX ADMIN — DONEPEZIL HYDROCHLORIDE 10 MG: 10 TABLET, ORALLY DISINTEGRATING ORAL at 22:09

## 2019-05-26 RX ADMIN — CLOPIDOGREL BISULFATE 75 MG: 75 TABLET ORAL at 09:50

## 2019-05-26 RX ADMIN — Medication 10 ML: at 22:19

## 2019-05-26 RX ADMIN — MICONAZOLE NITRATE: 20 POWDER TOPICAL at 12:44

## 2019-05-26 RX ADMIN — FUROSEMIDE 20 MG: 10 INJECTION, SOLUTION INTRAMUSCULAR; INTRAVENOUS at 09:52

## 2019-05-26 RX ADMIN — ENOXAPARIN SODIUM 40 MG: 40 INJECTION SUBCUTANEOUS at 09:11

## 2019-05-26 RX ADMIN — POTASSIUM CHLORIDE 10 MEQ: 750 TABLET, EXTENDED RELEASE ORAL at 09:10

## 2019-05-26 RX ADMIN — Medication 1 G: at 22:08

## 2019-05-26 RX ADMIN — AZITHROMYCIN MONOHYDRATE 500 MG: 500 INJECTION, POWDER, LYOPHILIZED, FOR SOLUTION INTRAVENOUS at 22:08

## 2019-05-26 RX ADMIN — POTASSIUM CHLORIDE 10 MEQ: 750 TABLET, EXTENDED RELEASE ORAL at 22:08

## 2019-05-26 RX ADMIN — TIOTROPIUM BROMIDE INHALATION SPRAY 2 PUFF: 3.12 SPRAY, METERED RESPIRATORY (INHALATION) at 12:44

## 2019-05-26 RX ADMIN — LEVOTHYROXINE SODIUM 100 MCG: 0.1 TABLET ORAL at 09:50

## 2019-05-26 RX ADMIN — ATORVASTATIN CALCIUM 10 MG: 20 TABLET, FILM COATED ORAL at 09:51

## 2019-05-26 RX ADMIN — FUROSEMIDE 20 MG: 10 INJECTION, SOLUTION INTRAMUSCULAR; INTRAVENOUS at 09:10

## 2019-05-26 NOTE — ED NOTES
Bed: 17  Expected date:   Expected time:   Means of arrival:   Comments:  [id number] [facility]  05/25/19 2022

## 2019-05-26 NOTE — H&P
Job #[id number]
daily, amlodipine 10 mg  daily, Xopenex HFA 1 to 2 puffs every 4 hours as needed, Spiriva 1  capsule that inhaled daily, metoprolol  mg daily, Plavix 75 mg  daily, omeprazole 40 mg daily, atorvastatin 10 mg daily, Synthroid 100  mcg daily, Lyrica 75 nightly, and nitroglycerin sublingual as needed. ALLERGIES:  She is allergy to MORPHINE.    PHYSICAL EXAMINATION:  VITAL SIGNS:  Her blood pressure is 101/49, pulse 56, currently,  respirations 16, temperature 97, and O2 sat is 94% on room air. GENERAL:  She did have two episodes of bradycardia, one into the 40s in  the early morning hours and then just about an hour ago, she dropped  down into the 30s. She is awake and unaware of what was going on the  second episode, was asleep during the first episode. HEENT:  She looks mildly and moderately chronically ill. NECK:  Supple. CHEST:  Has rales on the left. HEART:  Regular. ABDOMEN:  Benign. EXTREMITIES:  No clubbing or cyanosis. She has some edema in the left  foot, nontender. LABORATORY DATA:  Her laboratory evaluation, sodium 138, potassium 3.5,  chloride 107, CO2 is 19, BUN 16, creatinine is 0.7, and GFR is greater  than 60. Lactic acid 1.5. Glucose 109, calcium is 8.5, total protein  6.4, and BNP was 1667. Troponin is less than 0.01. LFTs were normal.   White count 9.5, hemoglobin 10.4, and platelets 894,036. Urine cultures  have been obtained. Blood cultures have been obtained. CT of the brain  showed no acute intracranial abnormality. X-ray of the foot did not  show fracture, just some soft tissue swelling. She has a small right  pleural effusion and associated opacity and looks like possible  pneumonia on x-ray. ASSESSMENT:  1. Right-sided pneumonia with pleural effusion. 2.  COPD exacerbation with hypoxia. 3.  Bradycardia possible early heart failure with the borderline BNP. 4.  Soft tissue swelling of the left foot, this may be just be some  asymmetric edema from fluid.   5.

## 2019-05-26 NOTE — ED PROVIDER NOTES
[facility]  eMERGENCY dEPARTMENT eNCOUnter      Pt Name: Tucker Quevedo  MRN: [id number]  [place] 1935  Date of evaluation: 5/25/2019  Provider: MD Vianey Jernigan       Chief Complaint   Patient presents with    Urinary Tract Infection         HISTORY OF PRESENT ILLNESS   (Location/Symptom, Timing/Onset,Context/Setting, Quality, Duration, Modifying Factors, Severity)  Note limiting factors. Tucker Quevedo is a 80 y.o. female who presents to the emergency department with altered mental status cough and pain with urination. The patient also has some left foot swelling has been going on for 3 weeks and was told that she has some peripheral vascular disease and is due to have angiography with possible intervention on [address] with Dr. Catherine Enciso. Patient has a small ulcer on her toe. Followed by podiatry. She's had no fever. She's had multiple episodes of stool and urinary incontinence because she doesn't want to get up because she has too much pain. She also doesn't want to use the restroom because it hurts to urinate. Family member states that she's had increased confusion recently as well. No definite fever but she's had chills. HPI    NursingNotes were reviewed. REVIEW OF SYSTEMS    (2-9 systems for level 4, 10 or more for level 5)     Review of Systems   Constitutional: Positive for activity change, appetite change and chills. Negative for fever. HENT: Negative for rhinorrhea and sore throat. Respiratory: Positive for cough. Negative for shortness of breath. Cardiovascular: Positive for leg swelling. Negative for chest pain. Gastrointestinal: Negative for abdominal pain, diarrhea, nausea and vomiting. Genitourinary: Positive for dysuria. Negative for difficulty urinating. Musculoskeletal: Negative for back pain and neck pain. Skin: Positive for wound. Negative for rash. Neurological: Positive for weakness. Negative for headaches.    Psychiatric/Behavioral: Positive for confusion. A complete review of systems was performed and is negative except as noted above in the HPI. PAST MEDICAL HISTORY     Past Medical History:   Diagnosis Date    AAA (abdominal aortic aneurysm) without rupture ([address] 3/25/2013    Arteriosclerotic heart disease     Coronary artery disease with LAD x2 drug-eluting stents.  Arthritis     Atherosclerosis of native arteries of the extremities with intermittent claudication     Atherosclerosis of native arteries of the extremities with intermittent claudication     CAD (coronary artery disease) 2009    Carotid artery occlusion 2009    Cerebrovascular disease     Heart attack ([address] 12/11/2015    History of blood transfusion     History of cardiac murmur     Hyperlipidemia 2009    Cholesterol management per Sanjeev Grey M.D.   [facility] Hypertension 2009    Hypothyroidism     Multiple sclerosis [facility]     Seizure disorder ([facility]     Possible    Thyroid disease 2009    Tobacco abuse, continuous 2009         SURGICAL HISTORY       Past Surgical History:   Procedure Laterality Date    ABDOMINAL AORTIC ANEURYSM REPAIR  [facility]/12.14.09    5.2 CM AAA; 14MM INTERPOSITIONAL DACRON GRAFT    APPENDECTOMY      CARDIAC CATHETERIZATION  06/11/2002    EF60%     CARDIAC CATHETERIZATION  6/17/15  [address]    EF 70%    CARDIAC CATHETERIZATION  12/11/2015    EF 60%, x2 drug-eluting stents LAD.  CARDIAC VALVE SURGERY  [facility]/12.11.09    & R/O    CAROTID ENDARTERECTOMY Right 1/11/2016    CAROTID ENDARTERECTOMY WITH EEG  performed by Vy Garcia MD at [address] [place] [address]      DIAGNOSTIC CARDIAC CATH LAB PROCEDURE  6/2/03    EF over 60% Normal LV function and hemodynamics, Mild nonocclusive CAD, w/o hemodynamically significant  lesions  identified     DIAGNOSTIC CARDIAC CATH LAB PROCEDURE  2/24/12    2 stents    ENDOSCOPY, COLON, DIAGNOSTIC      EYE SURGERY Bilateral     cataracts    FOOT SURGERY Right     exc. lesion/bx.     FRACTURE SURGERY Right     elbow    OVARIAN CYST SURGERY      THYROID SURGERY      VASCULAR SURGERY  05- [facility]    Percutaneous Transluminal Arterial Angioplasty of left superficial femoral artery using a 6*2 cutting balloon reducing a 90% stenosis to 0% stenosis    VASCULAR SURGERY  12/11/2015 [facility]    Procedure: aborted induction for carotis endarterectomy    VASCULAR SURGERY  1/11/2015 [facility]    Procedure: right CEA, eversion technique         CURRENT MEDICATIONS       Previous Medications    AMLODIPINE (NORVASC) 10 MG TABLET    TAKE 1 TABLET DAILY    ATORVASTATIN (LIPITOR) 10 MG TABLET    Take 10 mg by mouth daily. B COMPLEX VITAMINS (VITAMIN B COMPLEX PO)    Take by mouth    CLOPIDOGREL (PLAVIX) 75 MG TABLET    Take 1 tablet by mouth daily Indications: Cadmium Poisoning    DONEPEZIL (ARICEPT ODT) 10 MG DISINTEGRATING TABLET    Take 10 mg by mouth nightly    ESOMEPRAZOLE (NEXIUM) 40 MG CAPSULE    Take 40 mg by mouth every morning (before breakfast). FUROSEMIDE (LASIX) 40 MG TABLET    Take 40 mg by mouth daily    IBUPROFEN (ADVIL;MOTRIN) 600 MG TABLET    Take 1 tablet by mouth every 8 hours as needed for Pain    IRON PO    Take by mouth    LEVALBUTEROL (XOPENEX HFA) 45 MCG/ACT INHALER    Inhale 1-2 puffs into the lungs every 4 hours as needed for Wheezing    LEVOTHYROXINE SODIUM (SYNTHROID PO)    Take 0.1 mcg by mouth. LOSARTAN (COZAAR) 50 MG TABLET    TAKE 1 TABLET DAILY    METOPROLOL SUCCINATE (TOPROL XL) 100 MG EXTENDED RELEASE TABLET    TAKE 1 TABLET DAILY    NITROGLYCERIN (NITROSTAT) 0.4 MG SL TABLET    Place 1 tablet under the tongue every 5 minutes as needed for Chest pain. NYSTATIN (MYCOSTATIN) 739886 UNIT/GM POWDER    Apply topically 4 times daily. POTASSIUM CHLORIDE (KLOR-CON M) 20 MEQ TBCR EXTENDED RELEASE TABLET    Take 1 tablet by mouth daily    PREGABALIN (LYRICA) 75 MG CAPSULE    Take 75 mg by mouth nightly.     TIOTROPIUM (SPIRIVA) 18 MCG INHALATION CAPSULE    Inhale 18 mcg into the Oral 57 18 96 %         Physical Exam   Constitutional: She is oriented to person, place, and time. She appears well-developed and well-nourished. No distress. HENT:   Head: Normocephalic and atraumatic. Eyes: Pupils are equal, round, and reactive to light. Neck: Normal range of motion. Neck supple. Cardiovascular: Normal rate, regular rhythm, normal heart sounds and intact distal pulses. Pulmonary/Chest: Effort normal. No respiratory distress. She has rales. Abdominal: Soft. Bowel sounds are normal. She exhibits no distension. There is no tenderness. Musculoskeletal: Normal range of motion. She exhibits edema. 2+ pitting edema to dorsal aspect of left foot with small superficial ulceration to first digit   Neurological: She is alert and oriented to person, place, and time. No cranial nerve deficit. She exhibits normal muscle tone. Coordination normal.   Skin: Skin is warm and dry. No rash noted. She is not diaphoretic. Psychiatric: She has a normal mood and affect. Her behavior is normal.   Nursing note and vitals reviewed. DIAGNOSTIC RESULTS     EKG: All EKG's are interpreted by the Emergency Department Physician who either signs or Co-signs this chart in the absence of a cardiologist.  NSR rate 52 LBBB, unchanged from prior    RADIOLOGY:   Non-plain film images such as CT, Ultrasound and MRI are read by the radiologist. Plainradiographic images are visualized and preliminarily interpreted by the emergency physician with the below findings:        Interpretation per the Radiologist below, if available at the time of this note:    CT Head WO Contrast   Final Result   1. No acute intracranial abnormality. Signed by Dr Bravo Marquez on 5/25/2019 9:48 PM      XR CHEST PORTABLE   Final Result   Impression:   1. Small right pleural effusion and associated opacity. Clinical   correlation to exclude infection is recommended. 2.  Cardiomegaly.    Signed by Dr Bravo Marquez on 5/25/2019 9:26 PM      XR FOOT LEFT (MIN 3 VIEWS)   Final Result   Impression:   Soft tissue swelling along the dorsum of the foot without emphysema or   obvious fracture. .   Signed by Dr Yaw Sprague on 5/25/2019 9:24 PM            ED BEDSIDE ULTRASOUND:   Performed by ED Physician - none    LABS:  Labs Reviewed   CBC WITH AUTO DIFFERENTIAL - Abnormal; Notable for the following components:       Result Value    RBC 3.44 (*)     Hemoglobin 10.4 (*)     Hematocrit 34.2 (*)     MCV 99.4 (*)     MCHC 30.4 (*)     Neutrophils % 73.8 (*)     Lymphocytes % 14.3 (*)     All other components within normal limits   COMPREHENSIVE METABOLIC PANEL - Abnormal; Notable for the following components:    CO2 19 (*)     Calcium 8.5 (*)     Total Protein 6.4 (*)     Alb 3.1 (*)     All other components within normal limits   URINE RT REFLEX TO CULTURE - Abnormal; Notable for the following components:    Protein, UA TRACE (*)     Leukocyte Esterase, Urine SMALL (*)     All other components within normal limits   CULTURE BLOOD #1   CULTURE BLOOD #2   URINE CULTURE   APTT   TROPONIN   LACTIC ACID, PLASMA   PROTIME-INR   MICROSCOPIC URINALYSIS   BRAIN NATRIURETIC PEPTIDE       All other labs were within normal range or not returned as of this dictation. EMERGENCY DEPARTMENT COURSE and DIFFERENTIALDIAGNOSIS/MDM:   Vitals:    Vitals:    05/25/19 2017 05/25/19 2024   BP:  120/86   Pulse: 57 86   Resp: 18 15   Temp: 98 °F (36.7 °C) 98 °F (36.7 °C)   TempSrc: Oral    SpO2: 96% 97%       MDM  Pt's oxygen level is okay and normal wbc and lactate, but has productive cough, CXR showing infiltrate and effusion. D/w pt's granddaughter who lives with her and she would like her to stay due to increased confusion, weakness. Urine not convincing for UTI. Granddaughter states she has been battling yeast infection so may be the cause of her dysuria.  D/w Dr Dany Becerra for admission    CONSULTS:  None    PROCEDURES:  Unless otherwise notedbelow, none Procedures    FINAL IMPRESSION     1. Pneumonia due to organism    2.  Altered mental status, unspecified altered mental status type          DISPOSITION/PLAN   DISPOSITION        PATIENT REFERRED TO:  @FUP@    DISCHARGE MEDICATIONS:  New Prescriptions    No medications on file          (Please note that portions of this note were completed with a voice recognition program.  Efforts were made to edit the dictations butoccasionally words are mis-transcribed.)    Jose Carlos Sanders MD (electronically signed)  AttendingEmergency Physician         Jose Carlos Sanders MD  05/25/19 0639

## 2019-05-26 NOTE — PLAN OF CARE
Problem: Falls - Risk of:  Goal: Will remain free from falls  Description  Will remain free from falls  Outcome: Ongoing  Goal: Absence of physical injury  Description  Absence of physical injury  Outcome: Ongoing     Problem: Safety:  Goal: Ability to remain free from injury will improve  Description  Ability to remain free from injury will improve  Outcome: Ongoing     Problem: Self-Care:  Goal: Ability to participate in self-care as condition permits will improve  Description  Ability to participate in self-care as condition permits will improve  Outcome: Ongoing

## 2019-05-26 NOTE — FLOWSHEET NOTE
05/26/19 1010   Provider Notification   Reason for Communication Review case  (Consult for Dr. Rudy Lopes into answering service)   Provider Name Shahana Gunter   Provider Notification Physician   Method of Communication Page  (Through answering service. )   Response Waiting for response   Electronically signed by Breezy Martins RN on 5/26/2019 at 10:11 AM

## 2019-05-26 NOTE — PROGRESS NOTES
During shift report, it was shared that pt did cholo down into the 40's last night. This morning, call was received from telemetry that noted pt did cholo down into the 30s for a few beats. Pt was awake and had recently gone to the BR during shortly before this call was received. Pt has been asymptomatic to these changes thus far. Dr. Vianney Carlos notified.  Electronically signed by Mikhail Queen RN on 5/26/2019 at 8:28 AM

## 2019-05-26 NOTE — PLAN OF CARE
Problem: Falls - Risk of:  Goal: Will remain free from falls  Description  Will remain free from falls  Outcome: Met This Shift  Goal: Absence of physical injury  Description  Absence of physical injury  Outcome: Met This Shift     Problem: Safety:  Goal: Ability to remain free from injury will improve  Description  Ability to remain free from injury will improve  Outcome: Met This Shift     Problem: Self-Care:  Goal: Ability to participate in self-care as condition permits will improve  Description  Ability to participate in self-care as condition permits will improve  Outcome: Met This Shift

## 2019-05-27 ENCOUNTER — OUTSIDE FACILITY SERVICE (OUTPATIENT)
Dept: PULMONOLOGY | Facility: CLINIC | Age: 84
End: 2019-05-27

## 2019-05-27 LAB
ALBUMIN SERPL-MCNC: 3 G/DL (ref 3.5–5.2)
ALP BLD-CCNC: 76 U/L (ref 35–104)
ALT SERPL-CCNC: <5 U/L (ref 5–33)
ANION GAP SERPL CALCULATED.3IONS-SCNC: 14 MMOL/L (ref 7–19)
AST SERPL-CCNC: 12 U/L (ref 5–32)
BASOPHILS ABSOLUTE: 0 K/UL (ref 0–0.2)
BASOPHILS RELATIVE PERCENT: 0.5 % (ref 0–1)
BILIRUB SERPL-MCNC: <0.2 MG/DL (ref 0.2–1.2)
BUN BLDV-MCNC: 10 MG/DL (ref 8–23)
CALCIUM SERPL-MCNC: 7.9 MG/DL (ref 8.8–10.2)
CHLORIDE BLD-SCNC: 103 MMOL/L (ref 98–111)
CO2: 24 MMOL/L (ref 22–29)
CREAT SERPL-MCNC: 0.6 MG/DL (ref 0.5–0.9)
EKG P AXIS: 50 DEGREES
EKG P-R INTERVAL: 216 MS
EKG Q-T INTERVAL: 498 MS
EKG QRS DURATION: 126 MS
EKG QTC CALCULATION (BAZETT): 484 MS
EKG T AXIS: 50 DEGREES
EOSINOPHILS ABSOLUTE: 0.1 K/UL (ref 0–0.6)
EOSINOPHILS RELATIVE PERCENT: 1.4 % (ref 0–5)
GFR NON-AFRICAN AMERICAN: >60
GLUCOSE BLD-MCNC: 78 MG/DL (ref 74–109)
HCT VFR BLD CALC: 31 % (ref 37–47)
HEMOGLOBIN: 9.8 G/DL (ref 12–16)
LYMPHOCYTES ABSOLUTE: 1.2 K/UL (ref 1.1–4.5)
LYMPHOCYTES RELATIVE PERCENT: 13.2 % (ref 20–40)
MCH RBC QN AUTO: 29.6 PG (ref 27–31)
MCHC RBC AUTO-ENTMCNC: 31.6 G/DL (ref 33–37)
MCV RBC AUTO: 93.7 FL (ref 81–99)
MONOCYTES ABSOLUTE: 0.9 K/UL (ref 0–0.9)
MONOCYTES RELATIVE PERCENT: 10 % (ref 0–10)
NEUTROPHILS ABSOLUTE: 6.4 K/UL (ref 1.5–7.5)
NEUTROPHILS RELATIVE PERCENT: 73.8 % (ref 50–65)
PDW BLD-RTO: 14.2 % (ref 11.5–14.5)
PLATELET # BLD: 277 K/UL (ref 130–400)
PMV BLD AUTO: 10.1 FL (ref 9.4–12.3)
POTASSIUM SERPL-SCNC: 3.1 MMOL/L (ref 3.5–5)
PRO-BNP: 3264 PG/ML (ref 0–1800)
RBC # BLD: 3.31 M/UL (ref 4.2–5.4)
SODIUM BLD-SCNC: 141 MMOL/L (ref 136–145)
TOTAL PROTEIN: 5.8 G/DL (ref 6.6–8.7)
URINE CULTURE, ROUTINE: NORMAL
WBC # BLD: 8.7 K/UL (ref 4.8–10.8)

## 2019-05-27 PROCEDURE — 2580000003 HC RX 258: Performed by: FAMILY MEDICINE

## 2019-05-27 PROCEDURE — 80053 COMPREHEN METABOLIC PANEL: CPT

## 2019-05-27 PROCEDURE — 99223 1ST HOSP IP/OBS HIGH 75: CPT | Performed by: INTERNAL MEDICINE

## 2019-05-27 PROCEDURE — 1210000000 HC MED SURG R&B

## 2019-05-27 PROCEDURE — 36415 COLL VENOUS BLD VENIPUNCTURE: CPT

## 2019-05-27 PROCEDURE — 85025 COMPLETE CBC W/AUTO DIFF WBC: CPT

## 2019-05-27 PROCEDURE — 6360000002 HC RX W HCPCS: Performed by: FAMILY MEDICINE

## 2019-05-27 PROCEDURE — 6370000000 HC RX 637 (ALT 250 FOR IP): Performed by: FAMILY MEDICINE

## 2019-05-27 PROCEDURE — 83880 ASSAY OF NATRIURETIC PEPTIDE: CPT

## 2019-05-27 PROCEDURE — 99232 SBSQ HOSP IP/OBS MODERATE 35: CPT | Performed by: INTERNAL MEDICINE

## 2019-05-27 RX ORDER — ASPIRIN 81 MG/1
81 TABLET ORAL DAILY
Status: DISCONTINUED | OUTPATIENT
Start: 2019-05-27 | End: 2019-05-28 | Stop reason: HOSPADM

## 2019-05-27 RX ORDER — POTASSIUM CHLORIDE 20 MEQ/1
20 TABLET, EXTENDED RELEASE ORAL 2 TIMES DAILY
Status: COMPLETED | OUTPATIENT
Start: 2019-05-27 | End: 2019-05-28

## 2019-05-27 RX ORDER — AMLODIPINE BESYLATE 5 MG/1
5 TABLET ORAL DAILY
Status: DISCONTINUED | OUTPATIENT
Start: 2019-05-28 | End: 2019-05-28 | Stop reason: HOSPADM

## 2019-05-27 RX ADMIN — POTASSIUM CHLORIDE 20 MEQ: 20 TABLET, EXTENDED RELEASE ORAL at 09:55

## 2019-05-27 RX ADMIN — LEVOTHYROXINE SODIUM 100 MCG: 0.1 TABLET ORAL at 05:53

## 2019-05-27 RX ADMIN — ATORVASTATIN CALCIUM 10 MG: 20 TABLET, FILM COATED ORAL at 09:53

## 2019-05-27 RX ADMIN — TIOTROPIUM BROMIDE INHALATION SPRAY 2 PUFF: 3.12 SPRAY, METERED RESPIRATORY (INHALATION) at 10:26

## 2019-05-27 RX ADMIN — Medication 1 G: at 21:28

## 2019-05-27 RX ADMIN — MICONAZOLE NITRATE: 20 POWDER TOPICAL at 21:28

## 2019-05-27 RX ADMIN — ASPIRIN 81 MG: 81 TABLET ORAL at 16:36

## 2019-05-27 RX ADMIN — MICONAZOLE NITRATE: 20 POWDER TOPICAL at 10:25

## 2019-05-27 RX ADMIN — ENOXAPARIN SODIUM 40 MG: 40 INJECTION SUBCUTANEOUS at 09:54

## 2019-05-27 RX ADMIN — Medication 10 ML: at 21:29

## 2019-05-27 RX ADMIN — PREGABALIN 75 MG: 75 CAPSULE ORAL at 21:27

## 2019-05-27 RX ADMIN — FUROSEMIDE 20 MG: 10 INJECTION, SOLUTION INTRAMUSCULAR; INTRAVENOUS at 09:54

## 2019-05-27 RX ADMIN — Medication 10 ML: at 10:27

## 2019-05-27 RX ADMIN — CLOPIDOGREL BISULFATE 75 MG: 75 TABLET ORAL at 09:55

## 2019-05-27 RX ADMIN — AZITHROMYCIN MONOHYDRATE 500 MG: 500 INJECTION, POWDER, LYOPHILIZED, FOR SOLUTION INTRAVENOUS at 23:30

## 2019-05-27 RX ADMIN — DONEPEZIL HYDROCHLORIDE 10 MG: 10 TABLET, ORALLY DISINTEGRATING ORAL at 21:27

## 2019-05-27 RX ADMIN — LOSARTAN POTASSIUM 50 MG: 50 TABLET ORAL at 09:55

## 2019-05-27 RX ADMIN — POTASSIUM CHLORIDE 20 MEQ: 20 TABLET, EXTENDED RELEASE ORAL at 21:27

## 2019-05-27 RX ADMIN — PANTOPRAZOLE SODIUM 40 MG: 40 TABLET, DELAYED RELEASE ORAL at 05:54

## 2019-05-27 ASSESSMENT — ENCOUNTER SYMPTOMS
EYES NEGATIVE: 1
GASTROINTESTINAL NEGATIVE: 1
CHEST TIGHTNESS: 1

## 2019-05-27 NOTE — CONSULTS
Pulmonary and Critical Care Consult Note  THE [facility] Benita Doherty    MR# [id number]    Acct# [de-identified]  5/27/2019   12:13 PM    Referring Latisha Gutierrez MD  Chief Complaint: Abnormal chest x-ray and mental status changes. HPI: We have been consulted to see this 80y.o. year old female born on 1935. The patient is well known to me from being followed in my office for COPD. She is a long-standing smoker and has cut back but continues to smoke. She is admitted this time with mental status changes and the possibility of some pneumonia. Chest x-ray does show some infiltrate peripherally in the right upper lobe against the pleura. On my review old films remain been some mild area of density there previously at the [facility] appears to progress. This may represent an area of pneumonia, some scarring, or possibly neoplastic process. She also had a small right pleural effusion and had significant diastolic dysfunction and a 2-D echocardiogram and elevation of her BNP level. Currently she is awake and alert but did apparently have problems with significant confusion on admission. She also had some problems with bradycardia on admission. Past Medical History      Past Medical History:   Diagnosis Date    AAA (abdominal aortic aneurysm) without rupture ([address] 3/25/2013    Arteriosclerotic heart disease     Coronary artery disease with LAD x2 drug-eluting stents.      Arthritis     Atherosclerosis of native arteries of the extremities with intermittent claudication     Atherosclerosis of native arteries of the extremities with intermittent claudication     CAD (coronary artery disease) 2009    Carotid artery occlusion 2009    Cerebrovascular disease     Heart attack ([address] 12/11/2015    History of blood transfusion     History of cardiac murmur     Hyperlipidemia 2009    Cholesterol management per Lito Del Cid Hypertension 2009    Hypothyroidism     [facility] sclerosis [facility]     Seizure disorder ([facility]     Possible    Thyroid disease 2009    Tobacco abuse, continuous 2009     SurgicalHistory  Past Surgical History:   Procedure Laterality Date    ABDOMINAL AORTIC ANEURYSM REPAIR  [facility]/12.14.09    5.2 CM AAA; 14MM INTERPOSITIONAL DACRON GRAFT    APPENDECTOMY      CARDIAC CATHETERIZATION  06/11/2002    EF60%     CARDIAC CATHETERIZATION  6/17/15  [address]    EF 70%    CARDIAC CATHETERIZATION  12/11/2015    EF 60%, x2 drug-eluting stents LAD.  CARDIAC VALVE SURGERY  TJR/12.11.09    & R/O    CAROTID ENDARTERECTOMY Right 1/11/2016    CAROTID ENDARTERECTOMY WITH EEG  performed by Humza Rapp MD at [address] [place] [address]      DIAGNOSTIC CARDIAC CATH LAB PROCEDURE  6/2/03    EF over 60% Normal LV function and hemodynamics, Mild nonocclusive CAD, w/o hemodynamically significant  lesions  identified     DIAGNOSTIC CARDIAC CATH LAB PROCEDURE  2/24/12    2 stents    ENDOSCOPY, COLON, DIAGNOSTIC      EYE SURGERY Bilateral     cataracts    FOOT SURGERY Right     exc. lesion/bx.  FRACTURE SURGERY Right     elbow    OVARIAN CYST SURGERY      THYROID SURGERY      VASCULAR SURGERY  05- [facility]    Percutaneous Transluminal Arterial Angioplasty of left superficial femoral artery using a 6*2 cutting balloon reducing a 90% stenosis to 0% stenosis    VASCULAR SURGERY  12/11/2015 [facility]    Procedure: aborted induction for carotis endarterectomy    VASCULAR SURGERY  1/11/2015 [facility]    Procedure: right CEA, eversion technique     Allergies  Allergies   Allergen Reactions    Morphine      If patient has too much it causes hallucinations. If minor amount patient can take this.       Medications    aspirin 81 mg Oral Daily   potassium chloride 20 mEq Oral BID   [START ON 5/28/2019] amLODIPine 5 mg Oral Daily   azithromycin 500 mg Intravenous Q24H   cefTRIAXone (ROCEPHIN) IV 1 g Intravenous Q24H   atorvastatin 10 mg Oral Daily   clopidogrel 75 mg Oral Daily   donepezil 10 mg Oral Nightly 80   Patient Number  [id number]          Gender                Female   Visit Number    [id number]       Interpreting          Georgette Travis MD                                  Physician   Date of Birth   1935      Referring Physician   Canelo Pierson PA-C   Accession       [id number]       [address] [place] [address]  Number  Procedure Type of Study:   Extremities Arteries:Lower Arterial Plethysmography, VASC LOWER EXTREMITY  ART SEGMENTAL PRESSURES W PPG. Indications for Study:PVD. Risk Factors History of Disease +--------------------------+----+------------------------------------------+ ! Diagnosis                 ! Date! Comments                                  ! +--------------------------+----+------------------------------------------+ ! Neuro->Cataracts          !    !                                          ! +--------------------------+----+------------------------------------------+ ! Circulatory->CAD          ! !                                          ! +--------------------------+----+------------------------------------------+ ! Circulatory               ! !HTN; HYPERCHOLESTEROLEMIA                 ! +--------------------------+----+------------------------------------------+ ! Circulatory               ! !HEART CATH IN PAST                        ! +--------------------------+----+------------------------------------------+ ! Chronic lung disease      ! !RECENT SOB                                ! +--------------------------+----+------------------------------------------+ ! Endocrine->Thyroid        !    !                                          ! +--------------------------+----+------------------------------------------+ ! Musculoskeletal->Arthritis!    !                                          ! +--------------------------+----+------------------------------------------+   - The patient's risk factor(s) include: dyslipidemia and arterial     hypertension.   - Current - Every day. Allergies   - No known allergies. Impression   The patient has moderately diminished ankle-brachial indices bilateral  lower extremity(ies) which would be consistent with claudication level  symptoms. Based on segmental pressures and doppler waveforms, the patient likely has  disease in the bilateral superficial femoral, arterial segments. Note: low toe pressures may indicate foot level occlusive disease and  severe decrease in flow to the forefeet. Signature   ----------------------------------------------------------------  Electronically signed by Luli Avalos MD(Interpreting  physician) on 05/06/2019 06:04 AM  ----------------------------------------------------------------  Velocities are measured in cm/s ; Diameters are measured in mm Pressures +--------------------------------------++--------+-----+----+--------+-----+ ! ! !Right   ! ! Left!        !     ! +--------------------------------------++--------+-----+----+--------+-----+ ! Location                              ! !Pressure! Ratio! !Pressure! Ratio! +--------------------------------------++--------+-----+----+--------+-----+ ! Upper Thigh                           !!132     !0.92 !    !130     !0.9  ! +--------------------------------------++--------+-----+----+--------+-----+ ! Lower Thigh                           !!86      !0.6  ! !76      !0.53 ! +--------------------------------------++--------+-----+----+--------+-----+ ! Calf                                  !!255     !1.77 !    !255     !1.77 ! +--------------------------------------++--------+-----+----+--------+-----+ ! Ankle PT                              !!87      !0.6  ! !91      !0.63 ! +--------------------------------------++--------+-----+----+--------+-----+ ! DP                                    !!77      !0.53 !    !79      !0.55 ! +--------------------------------------++--------+-----+----+--------+-----+ !Great Toe                             !!0       !0    !    !0       !0    ! +--------------------------------------++--------+-----+----+--------+-----+   - Brachial Pressure:Right: 134. Left:144.   - CESAR:Right: 0. 6. Left: 0.63. Plethysmographic Digit Evaluation +---------++--------+-----+---------------++--------+-----+----------------+ ! ! !Right   ! ! Left           !!        !     !                ! +---------++--------+-----+---------------++--------+-----+----------------+ ! Location ! !Pressure! Ratio! PPG Wave Form  ! !Pressure! Ratio! PPG Wave Form   ! +---------++--------+-----+---------------++--------+-----+----------------+ ! Great Toe!!0       !0    !               !!0       !0    !                ! +---------++--------+-----+---------------++--------+-----+----------------+    My radiograph interpretation/independent review of imaging: She does have a peripheral right upper lobe infiltrate located up against the pleura. Other test results (not lab or imaging): 2-D echocardiogram reveals grade 2 diastolic dysfunction. She has significant COPD by prior pulmonary function studies from our office which I reviewed. Independent review of ekg: Normal sinus rhythm.   Problem list generated by [facility]:  Patient Active Problem List   Diagnosis    Hyperlipidemia    Hypertension    Hypothyroidism    Atherosclerosis of native artery of extremity with intermittent claudication ([address]    Multiple sclerosis ([address]    Seizure disorder ([address]    Cerebrovascular disease    Carotid artery stenosis    AAA (abdominal aortic aneurysm) without rupture (HCC)    Right carotid bruit    Smoker    Chronic respiratory failure (HCC)    Acute ST elevation myocardial infarction (STEMI) involving left anterior descending (LAD) coronary artery ([address]    Stenosis of right carotid artery    Vascular disease    2 KELY to LAD    CAD (coronary artery disease)    Altered mental state    Tremor    Fatigue    Status post

## 2019-05-27 NOTE — PROGRESS NOTES
Telemetry called and stated pt's heart rate dropped to 36 and then came right back up to the 50-60's. Pt asymptomatic, warm, dry, awake. Dr. Ivett Ruvalcaba of episodes, will monitor.

## 2019-05-27 NOTE — CONSULTS
Stenosis of right carotid artery        Vascular disease        CAD (coronary artery disease)        PVD (peripheral vascular disease) (HCC)        Atherosclerosis of native artery of left lower extremity with ulceration of midfoot (HCC)                PRIOR CARDIAC PROBLEM LIST  (IF APPLICABLE):      8/07/5293  Cath  Severe disease in the RCA, two KELY, normal LVFX  6/2/2003  Cath  Mild CAD, normal LVFX  12/6/2006  DSE clinically positive  10/17/2007  DSE negative for myocardial ischemia  10/16/2008  DSE negative for myocardial ischemia  10/21/2009  DSE clinically positive  2/24/2012  Echo normal LVFX  2/24/2012  Cath  Moderate CAD, patent stents, normal LVFX  5/21/2015  lexiscan Positive for apical myocardial ischemia, EF 82%  6/17/2015  Cath  Moderate disease in the LAD, patent stents, normal LVFX  12/11/2015  Cath KELY x 2 to proximal LAD, normal LVFX  5/26/2019  Echo normal LVFX          Past Medical History:    Past Medical History:   Diagnosis Date    AAA (abdominal aortic aneurysm) without rupture ([address] 3/25/2013    Arteriosclerotic heart disease     Coronary artery disease with LAD x2 drug-eluting stents.      Arthritis     Atherosclerosis of native arteries of the extremities with intermittent claudication     Atherosclerosis of native arteries of the extremities with intermittent claudication     CAD (coronary artery disease) 2009    Carotid artery occlusion 2009    Cerebrovascular disease     Heart attack ([address] 12/11/2015    History of blood transfusion     History of cardiac murmur     Hyperlipidemia 2009    Cholesterol management per Satish Wray Hypertension 2009    Hypothyroidism     Multiple sclerosis ([address]     Seizure disorder ([address]     Possible    Thyroid disease 2009    Tobacco abuse, continuous 2009         Past Surgical History:    Past Surgical History:   Procedure Laterality Date    ABDOMINAL AORTIC ANEURYSM REPAIR  [facility]/12.14.09    5.2 CM AAA; 14MM INTERPOSITIONAL DACRON GRAFT    APPENDECTOMY      CARDIAC CATHETERIZATION  06/11/2002    EF60%     CARDIAC CATHETERIZATION  6/17/15  [address]    EF 70%    CARDIAC CATHETERIZATION  12/11/2015    EF 60%, x2 drug-eluting stents LAD.  CARDIAC VALVE SURGERY  [facility]/12.11.09    & R/O    CAROTID ENDARTERECTOMY Right 1/11/2016    CAROTID ENDARTERECTOMY WITH EEG  performed by Vielka Bryant MD at [address] [place] [address]      DIAGNOSTIC CARDIAC CATH LAB PROCEDURE  6/2/03    EF over 60% Normal LV function and hemodynamics, Mild nonocclusive CAD, w/o hemodynamically significant  lesions  identified     DIAGNOSTIC CARDIAC CATH LAB PROCEDURE  2/24/12    2 stents    ENDOSCOPY, COLON, DIAGNOSTIC      EYE SURGERY Bilateral     cataracts    FOOT SURGERY Right     exc. lesion/bx.  FRACTURE SURGERY Right     elbow    OVARIAN CYST SURGERY      THYROID SURGERY      VASCULAR SURGERY  05- [facility]    Percutaneous Transluminal Arterial Angioplasty of left superficial femoral artery using a 6*2 cutting balloon reducing a 90% stenosis to 0% stenosis    VASCULAR SURGERY  12/11/2015 [facility]    Procedure: aborted induction for carotis endarterectomy    VASCULAR SURGERY  1/11/2015 [facility]    Procedure: right CEA, eversion technique         Home Medications:   Prior to Admission medications    Medication Sig Start Date End Date Taking? Authorizing Provider   nystatin (MYCOSTATIN) 746171 UNIT/GM powder Apply topically 4 times daily.  4/24/19  Yes ALLEN Schmitz   potassium chloride (KLOR-CON M) 20 MEQ TBCR extended release tablet Take 1 tablet by mouth daily 6/25/18  Yes Eliel Cross MD   ibuprofen (ADVIL;MOTRIN) 600 MG tablet Take 1 tablet by mouth every 8 hours as needed for Pain 6/25/18  Yes Eliel Cross MD   donepezil (ARICEPT ODT) 10 MG disintegrating tablet Take 10 mg by mouth nightly   Yes Historical Provider, MD   furosemide (LASIX) 40 MG tablet Take 40 mg by mouth daily   Yes Historical Provider, MD   IRON PO Take by mouth Yes Historical Provider, MD   B Complex Vitamins (VITAMIN B COMPLEX PO) Take by mouth   Yes Historical Provider, MD   losartan (COZAAR) 50 MG tablet TAKE 1 TABLET DAILY 5/26/17  Yes Carmen Johnson MD   amLODIPine (NORVASC) 10 MG tablet TAKE 1 TABLET DAILY 5/26/17  Yes Carmen Johnson MD   levalbuterol [facility] HFA) 45 MCG/ACT inhaler Inhale 1-2 puffs into the lungs every 4 hours as needed for Wheezing   Yes Historical Provider, MD   tiotropium (SPIRIVA) 18 MCG inhalation capsule Inhale 18 mcg into the lungs daily   Yes Historical Provider, MD   metoprolol succinate (TOPROL XL) 100 MG extended release tablet TAKE 1 TABLET DAILY 1/25/17  Yes ALLEN Childress   clopidogrel (PLAVIX) 75 MG tablet Take 1 tablet by mouth daily Indications: Cadmium Poisoning 9/27/16  Yes ALLEN Childress   esomeprazole (NEXIUM) 40 MG capsule Take 40 mg by mouth every morning (before breakfast). Yes Historical Provider, MD   atorvastatin (LIPITOR) 10 MG tablet Take 10 mg by mouth daily. Yes Historical Provider, MD   Levothyroxine Sodium (SYNTHROID PO) Take 0.1 mcg by mouth. Yes Historical Provider, MD   pregabalin (LYRICA) 75 MG capsule Take 75 mg by mouth nightly. Yes Historical Provider, MD   nitroGLYCERIN (NITROSTAT) 0.4 MG SL tablet Place 1 tablet under the tongue every 5 minutes as needed for Chest pain.  8/23/13   ALLEN Walker        Facility Administered Medications:    azithromycin  500 mg Intravenous Q24H    cefTRIAXone (ROCEPHIN) IV  1 g Intravenous Q24H    potassium chloride  10 mEq Oral BID    amLODIPine  10 mg Oral Daily    atorvastatin  10 mg Oral Daily    clopidogrel  75 mg Oral Daily    donepezil  10 mg Oral Nightly    [START ON 5/27/2019] pantoprazole  40 mg Oral QAM AC    levothyroxine  100 mcg Oral Daily    losartan  50 mg Oral Daily    miconazole   Topical BID    pregabalin  75 mg Oral Nightly    tiotropium  2 puff Inhalation Daily    furosemide  20 mg Intravenous BID    sodium chloride flush  10 mL Intravenous 2 times per day    enoxaparin  40 mg Subcutaneous Daily       Allergies:  Morphine     Social History:       Social History     Socioeconomic History    Marital status:       Spouse name: Not on file    Number of children: 3    Years of education: Not on file    Highest education level: Not on file   Occupational History     Employer: RETIRED   Social Needs    Financial resource strain: Not on file    Food insecurity:     Worry: Not on file     Inability: Not on file    Transportation needs:     Medical: Not on file     Non-medical: Not on file   Tobacco Use    Smoking status: Current Every Day Smoker     Packs/day: 2.00     Years: 50.00     Pack years: 100.00    Smokeless tobacco: Never Used   Substance and Sexual Activity    Alcohol use: No    Drug use: No    Sexual activity: Not on file   Lifestyle    Physical activity:     Days per week: Not on file     Minutes per session: Not on file    Stress: Not on file   Relationships    Social connections:     Talks on phone: Not on file     Gets together: Not on file     Attends Anabaptism service: Not on file     Active member of club or organization: Not on file     Attends meetings of clubs or organizations: Not on file     Relationship status: Not on file    Intimate partner violence:     Fear of current or ex partner: Not on file     Emotionally abused: Not on file     Physically abused: Not on file     Forced sexual activity: Not on file   Other Topics Concern    Not on file   Social History Narrative    Not on file       Family History:     Family History   Problem Relation Age of Onset    Heart Attack Mother     Heart Attack Father     Coronary Art Dis Father     Other Other         AAA/2009    Other Sister         AAA/2009         REVIEW OF SYSTEMS:     Except as noted in the HPI, all other systems are negative        PHYSICAL EXAMINATION:     BP (!) 135/54   Pulse 66   Temp 98.3 °F (36.8 °C) (Temporal)   Resp 20   Wt 97 lb 7 oz (44.2 kg)   SpO2 97%   BMI 16.73 kg/m²     GENERAL - well developed and well nourished, in no amount of generalized distress; is an active participant in this examination  HEENT -  PERRLA, Hearing appears normal, conjunctiva and lids are normal, ears and nose appear normal  NECK - no thyromegaly, no JVD, trachea is in the midline  CARDIOVASCULAR - PMI is in the left mid line clavicular position, Normal S1 and S2 with a grade 1/6 systolic murmur. No S3 or S4    PULMONARY - No respiratory distress. scattered wheezes and rales.   Breath sounds in both  lung fields are Decreased  ABDOMEN  - soft, non tender, no rebound, no hepatomegaly or splenomegaly  MUSCULOSKELETAL  - sitting, digitals and nails are without clubbing or cyanosis  EXTREMITIES - trace+ edema  NEUROLOGIC - cranial nerves, II-XII, are normal  SKIN - turgor is normal, no rash  PSYCHIATRIC - normal mood and affect, alert and orientated x 3, judgement and insight appear appropriate      LABORATORY EVALUATION & TESTING:    I have personally reviewed and interpreted the results of the following diagnostic testing      EKG and or Telemetry:  which was personally reviewed me:  Sinus rhythm,   Pulse Readings from Last 1 Encounters:   05/26/19 66    bpm,  without Acute changes    Troponin:  negative myocardial necrosis (  <0.01); the creatinine is normal    CBC:   Recent Labs     05/25/19 2101   WBC 9.5   HGB 10.4*   HCT 34.2*   MCV 99.4*        BMP:   Recent Labs     05/25/19 2101      K 3.5      CO2 19*   BUN 16   CREATININE 0.7     Cardiac Enzymes:   Recent Labs     05/25/19 2101   TROPONINI <0.01     PT/INR:   Recent Labs     05/25/19 2101   PROTIME 14.4   INR 1.18     APTT:   Recent Labs     05/25/19 2101   APTT 27.6     Liver Profile:  Lab Results   Component Value Date    AST 14 05/25/2019    ALT 6 05/25/2019    BILITOT <0.2 05/25/2019    ALKPHOS 87 05/25/2019     Lab Results   Component Value Date    CHOL 162 12/09/2016    HDL 47 12/09/2016    TRIG 151 12/09/2016     TSH:  Lab Results   Component Value Date    TSH 48.86 12/09/2016     UA:   Lab Results   Component Value Date    COLORU YELLOW 05/25/2019    PHUR 7.0 05/25/2019    WBCUA 4 05/25/2019    RBCUA 3 05/25/2019    BACTERIA NEGATIVE 05/25/2019    CLARITYU Clear 05/25/2019    SPECGRAV 1.019 05/25/2019    LEUKOCYTESUR SMALL 05/25/2019    UROBILINOGEN 1.0 05/25/2019    BILIRUBINUR Negative 05/25/2019    BLOODU Negative 05/25/2019    GLUCOSEU Negative 05/25/2019             ALL THE CARDIOLOGY PROBLEMS ARE LISTED ABOVE; HOWEVER, THE FOLLOWING SPECIFIC CARDIAC PROBLEMS WERE ADDRESSED AND  TREATED DURING THE HOSPITAL     MEDICAL DECISION MAKING               Cardiac Specific Problem / Diagnosis  Discussion and Data Reviewed Diagnostic Procedures Ordered Management Options Selected           1.  Presenting problem / symptom    nocturnal bradycardia  show no change   Seems to be assymptomatic No Continue current medications:     Yes:            2. CAD Initial presentation during this evaluation   Review and summation of old records:      6/14/2002  Cath  Severe disease in the RCA, two KELY, normal LVFX  6/2/2003  Cath  Mild CAD, normal LVFX  12/6/2006  DSE clinically positive  10/17/2007  DSE negative for myocardial ischemia  10/16/2008  DSE negative for myocardial ischemia  10/21/2009  DSE clinically positive  2/24/2012  Echo normal LVFX  2/24/2012  Cath  Moderate CAD, patent stents, normal LVFX  5/21/2015  lexiscan Positive for apical myocardial ischemia, EF 82%  6/17/2015  Cath  Moderate disease in the LAD, patent stents, normal LVFX  12/11/2015  Cath KELY x 2 to proximal LAD, normal LVFX  5/26/2019  Echo normal LVFX       No Continue current medications:    Yes:            3. Systemic arterial hypertension Initial presentation during this evaluation Systolic (37MIZ), SAMUEL:106 , Min:101 , OUD:757    Diastolic (08LSO), UFV:77, Min:49, Max:86   No Continue current medications:       yes         DISCUSSION AND PLAN:    1. I had a detailed discussion with the patient and / or family regarding the historical points, physical examination findings, and any diagnostic results supporting the admission diagnosis. The patient was educated on care and need for admission. questions were invited and answered. Patient and / or family shows understanding of admission information and agrees to follow. 2. Continue present medications except for changes as noted above    3. Continue to monitor rhythm    4. Further orders per clinical course. 5. On no negative chronotropes. Will reassess tomorrow to see if a holter or zio would be suitable for the patient. Discussed with patient and nursing.     I greatly appreciate the opportunity and your confidence in allowing me to participate in the care of Alexis Albarran    Electronically signed by Jena Lomeli MD on 5/26/19     [id number] [facility]

## 2019-05-27 NOTE — PROGRESS NOTES
is presented to help understand a power uses no problem Progress Note  5/27/2019 8:52 AM  Subjective:   Admit Date: 5/25/2019  PCP: Jenise Babinski, MD    Interval History: She feels better and was able to eat most of her breakfast, this am.    DIET GENERAL; Intake/Output Summary (Last 24 hours) at 5/27/2019 0852  Last data filed at 5/27/2019 0342  Gross per 24 hour   Intake 440 ml   Output --   Net 440 ml     Medications:      aspirin  81 mg Oral Daily    azithromycin  500 mg Intravenous Q24H    cefTRIAXone (ROCEPHIN) IV  1 g Intravenous Q24H    potassium chloride  10 mEq Oral BID    amLODIPine  10 mg Oral Daily    atorvastatin  10 mg Oral Daily    clopidogrel  75 mg Oral Daily    donepezil  10 mg Oral Nightly    pantoprazole  40 mg Oral QAM AC    levothyroxine  100 mcg Oral Daily    losartan  50 mg Oral Daily    miconazole   Topical BID    pregabalin  75 mg Oral Nightly    tiotropium  2 puff Inhalation Daily    furosemide  20 mg Intravenous BID    sodium chloride flush  10 mL Intravenous 2 times per day    enoxaparin  40 mg Subcutaneous Daily     Recent Labs     05/25/19  2101 05/27/19  0246   WBC 9.5 8.7   HGB 10.4* 9.8*    277     Recent Labs     05/25/19  2101 05/27/19  0246    141   K 3.5 3.1*    103   CO2 19* 24   BUN 16 10   CREATININE 0.7 0.6   GLUCOSE 109 78     Recent Labs     05/25/19  2101 05/27/19  0246   AST 14 12   ALT 6 <5*   BILITOT <0.2 <0.2   ALKPHOS 87 76       Objective:   Vitals: BP (!) 101/53   Pulse 59   Temp 98.1 °F (36.7 °C) (Temporal)   Resp 20   Wt 97 lb 7 oz (44.2 kg)   SpO2 94%   BMI 16.73 kg/m²   General appearance: alert and cooperative with exam  Lungs: Bilateral rhonci  Heart: regular rate and rhythm, S1, S2 normal, no murmur, click, rub or gallop  Abdomen: soft, non-tender; bowel sounds normal; no masses,  no organomegaly  Extremities: Left foot edema. Bilateral decreased pulses.   Neurologic: No obvious focal neurologic deficits. Assessment and Plan:   1. Right sided pneumonia/?mass: IV abx ordered. Ask pulmonology to see. 2. COPD exac: Cont nebs and abx. 3. Diastolic CHF: Dr Denver Rao is seeing. 4. Bradycardia: Dr Denver Rao is seeing. 5. PVD: She has significant decreased flow to her feet. She is being followed as an outpatient by [facility]. I will ad a low dose aspirin. 6. Encephalopathy: This is due to pneumonia and chronic issues. 7. Hypokalemia: Replete    Advance Directive: Full Code  DVT prophylaxis with enoxaparin 40 mg sub-Q daily. Discharge planning: ? Active Problems:    CAD (coronary artery disease)    Pneumonia  Resolved Problems:    * No resolved hospital problems.  Cody Gomez MD

## 2019-05-27 NOTE — PROGRESS NOTES
Rounds completed with Dr. Jacey Cordero @ [id number]. Full note to follow. Sinus rhythm @ 66 on telemetry. Will continue to monitor.   Electronically signed by Lisy Vang RN on 5/27/2019 at 6:51 PM

## 2019-05-28 ENCOUNTER — OUTSIDE FACILITY SERVICE (OUTPATIENT)
Dept: PULMONOLOGY | Facility: CLINIC | Age: 84
End: 2019-05-28

## 2019-05-28 VITALS
WEIGHT: 97.44 LBS | DIASTOLIC BLOOD PRESSURE: 60 MMHG | BODY MASS INDEX: 19.13 KG/M2 | HEIGHT: 60 IN | TEMPERATURE: 97.3 F | HEART RATE: 65 BPM | SYSTOLIC BLOOD PRESSURE: 98 MMHG | OXYGEN SATURATION: 92 % | RESPIRATION RATE: 16 BRPM

## 2019-05-28 LAB
ANION GAP SERPL CALCULATED.3IONS-SCNC: 11 MMOL/L (ref 7–19)
BUN BLDV-MCNC: 13 MG/DL (ref 8–23)
CALCIUM SERPL-MCNC: 8.1 MG/DL (ref 8.8–10.2)
CHLORIDE BLD-SCNC: 104 MMOL/L (ref 98–111)
CO2: 23 MMOL/L (ref 22–29)
CREAT SERPL-MCNC: 0.7 MG/DL (ref 0.5–0.9)
GFR NON-AFRICAN AMERICAN: >60
GLUCOSE BLD-MCNC: 123 MG/DL (ref 74–109)
POTASSIUM SERPL-SCNC: 4.3 MMOL/L (ref 3.5–5)
SODIUM BLD-SCNC: 138 MMOL/L (ref 136–145)

## 2019-05-28 PROCEDURE — 36415 COLL VENOUS BLD VENIPUNCTURE: CPT

## 2019-05-28 PROCEDURE — 6370000000 HC RX 637 (ALT 250 FOR IP): Performed by: FAMILY MEDICINE

## 2019-05-28 PROCEDURE — 80048 BASIC METABOLIC PNL TOTAL CA: CPT

## 2019-05-28 PROCEDURE — 99232 SBSQ HOSP IP/OBS MODERATE 35: CPT | Performed by: NURSE PRACTITIONER

## 2019-05-28 PROCEDURE — 6360000002 HC RX W HCPCS: Performed by: FAMILY MEDICINE

## 2019-05-28 PROCEDURE — 2580000003 HC RX 258: Performed by: FAMILY MEDICINE

## 2019-05-28 RX ORDER — AMLODIPINE BESYLATE 5 MG/1
5 TABLET ORAL DAILY
Qty: 30 TABLET | Refills: 5 | Status: ON HOLD | OUTPATIENT
Start: 2019-05-28 | End: 2019-08-17

## 2019-05-28 RX ORDER — CEFDINIR 300 MG/1
300 CAPSULE ORAL 2 TIMES DAILY
Qty: 14 CAPSULE | Refills: 0 | Status: SHIPPED | OUTPATIENT
Start: 2019-05-28 | End: 2019-06-04

## 2019-05-28 RX ORDER — ASPIRIN 81 MG/1
81 TABLET ORAL DAILY
Qty: 30 TABLET | Refills: 3 | Status: ON HOLD | OUTPATIENT
Start: 2019-05-28 | End: 2019-08-26 | Stop reason: HOSPADM

## 2019-05-28 RX ADMIN — ENOXAPARIN SODIUM 40 MG: 40 INJECTION SUBCUTANEOUS at 07:50

## 2019-05-28 RX ADMIN — LEVOTHYROXINE SODIUM 100 MCG: 0.1 TABLET ORAL at 06:16

## 2019-05-28 RX ADMIN — TIOTROPIUM BROMIDE INHALATION SPRAY 2 PUFF: 3.12 SPRAY, METERED RESPIRATORY (INHALATION) at 07:52

## 2019-05-28 RX ADMIN — ASPIRIN 81 MG: 81 TABLET ORAL at 07:51

## 2019-05-28 RX ADMIN — PANTOPRAZOLE SODIUM 40 MG: 40 TABLET, DELAYED RELEASE ORAL at 06:16

## 2019-05-28 RX ADMIN — POTASSIUM CHLORIDE 20 MEQ: 20 TABLET, EXTENDED RELEASE ORAL at 07:52

## 2019-05-28 RX ADMIN — Medication 10 ML: at 07:53

## 2019-05-28 RX ADMIN — CLOPIDOGREL BISULFATE 75 MG: 75 TABLET ORAL at 07:50

## 2019-05-28 RX ADMIN — ATORVASTATIN CALCIUM 10 MG: 20 TABLET, FILM COATED ORAL at 07:51

## 2019-05-28 RX ADMIN — LOSARTAN POTASSIUM 50 MG: 50 TABLET ORAL at 07:51

## 2019-05-28 RX ADMIN — MICONAZOLE NITRATE: 20 POWDER TOPICAL at 07:54

## 2019-05-28 NOTE — DISCHARGE SUMMARY
follow up with Dr. Mile Culver and they are going to do a repeat  chest x-ray in 4 to 6 weeks to reevaluate that area and we will send her  home on Omnicef 300 mg b.i.d. for an additional 7 days for the  pneumonia.         Tiffany Hatch MD    D: 05/28/2019 8:08:54      T: 05/28/2019 11:44:45     [id number]  Job#: [id number]     Doc#: [id number]    CC:

## 2019-05-28 NOTE — PLAN OF CARE
Problem: Falls - Risk of:  Goal: Will remain free from falls  Description  Will remain free from falls  5/28/2019 0813 by Marcus Vides RN  Outcome: Ongoing  5/27/2019 2330 by Dino Garza RN  Outcome: Ongoing  Goal: Absence of physical injury  Description  Absence of physical injury  5/28/2019 0813 by Marcus Vides RN  Outcome: Ongoing  5/27/2019 2330 by Dino Garza RN  Outcome: Ongoing     Problem: Safety:  Goal: Ability to remain free from injury will improve  Description  Ability to remain free from injury will improve  5/28/2019 0813 by Marcus Vides RN  Outcome: Ongoing  5/27/2019 2330 by Dino Garza RN  Outcome: Ongoing     Problem: Self-Care:  Goal: Ability to participate in self-care as condition permits will improve  Description  Ability to participate in self-care as condition permits will improve  5/27/2019 2330 by Dino Garza RN  Outcome: Ongoing

## 2019-05-28 NOTE — PROGRESS NOTES
Pulmonary and Critical Care Progress Note [address] [facility]    Patient: Reuben Nyhan  1935   MR# [id number]   Acct# [de-identified]  05/28/19   7:04 AM  Referring Provider: Juve Wayne MD    Chief Complaint: RUL infiltrate     Interval history: Patient is up in the chair, breathing comfortably on room air. She feels ready to go home and nursing reports that she will be discharged home at some point today. aspirin 81 mg Oral Daily   potassium chloride 20 mEq Oral BID   amLODIPine 5 mg Oral Daily   azithromycin 500 mg Intravenous Q24H   cefTRIAXone (ROCEPHIN) IV 1 g Intravenous Q24H   atorvastatin 10 mg Oral Daily   clopidogrel 75 mg Oral Daily   donepezil 10 mg Oral Nightly   pantoprazole 40 mg Oral QAM AC   levothyroxine 100 mcg Oral Daily   losartan 50 mg Oral Daily   miconazole  Topical BID   pregabalin 75 mg Oral Nightly   tiotropium 2 puff Inhalation Daily   sodium chloride flush 10 mL Intravenous 2 times per day   enoxaparin 40 mg Subcutaneous Daily       Review of Systems:   Review of Systems  Constitutional: Positive for activity change. HENT: Negative. Eyes: Negative. Respiratory: Negative for shortness of breath. Positive for leg swelling. Gastrointestinal: Negative. Genitourinary: Positive for dysuria. Musculoskeletal: Negative. Skin: Negative. Physical Exam:  Blood pressure (!) 104/59, pulse 65, temperature 97.6 °F (36.4 °C), temperature source Temporal, resp. rate 14, height 5' (1.524 m), weight 97 lb 7 oz (44.2 kg), SpO2 94 %. Intake/Output Summary (Last 24 hours) at 5/28/2019 0704  Last data filed at 5/28/2019 0417  Gross per 24 hour   Intake 740 ml   Output --   Net 740 ml     Physical Exam   Constitutional: She is oriented to person, place, and time. She appears well-developed. She appears ill (chronically). Nasal cannula in place. Thin, frail   HENT:   Head: Normocephalic and atraumatic. Eyes: Pupils are equal, round, and reactive to light.    Neck: Normal range of motion. Neck supple. Cardiovascular: Regular rhythm. Pulmonary/Chest: She has decreased breath sounds. Abdominal: Soft. Bowel sounds are normal.   Neurological: She is alert and oriented to person, place, and time. Skin: Skin is warm and dry. Psychiatric: She has a normal mood and affect. Nursing note and vitals reviewed. Recent Labs     05/25/19 2101 05/27/19  0246   WBC 9.5 8.7   RBC 3.44* 3.31*   HGB 10.4* 9.8*   HCT 34.2* 31.0*    277   MCV 99.4* 93.7   MCH 30.2 29.6   MCHC 30.4* 31.6*   RDW 14.2 14.2      Recent Labs     05/25/19 2101 05/27/19 0246 05/28/19  0111    141 138   K 3.5 3.1* 4.3    103 104   CO2 19* 24 23   BUN 16 10 13   CREATININE 0.7 0.6 0.7   CALCIUM 8.5* 7.9* 8.1*   GLUCOSE 109 78 123*   AST 14 12  --    ALT 6 <5*  --    ALKPHOS 87 76  --    BILITOT <0.2 <0.2  --    TROPONINI <0.01  --   --    LACTA 1.5  --   --    INR 1.18  --   --       Recent Labs     05/25/19 2055   BC No Growth to date. Any change in status will be called. Radiograph:   My radiograph interpretation: No new today    Pulmonary Assessment:    1. RUL infiltrate, rule out pneumonia versus mass   2. COPD  3. Chronic tobacco use  4. Diastolic dysfunction  5. Bradycardia, resolved     Recommend:   · Continue pulmonary regimen at home   · Follow-up with Charles Austin in the office this week on 5-31-19  · F/u CXR in 2 weeks has been ordered in [address] [place] [address] Rd.      Electronically signed by Radha Schofield on 5/28/2019 at 7:04 AM

## 2019-05-28 NOTE — PROGRESS NOTES
is presented to help understand a power uses no problem Progress Note  5/28/2019 6:32 AM  Subjective:   Admit Date: 5/25/2019  PCP: Myriam Griffiths MD    Interval History: She feels better and wants to go home. DIET GENERAL; Intake/Output Summary (Last 24 hours) at 5/28/2019 5151  Last data filed at 5/28/2019 0417  Gross per 24 hour   Intake 740 ml   Output --   Net 740 ml     Medications:      aspirin  81 mg Oral Daily    potassium chloride  20 mEq Oral BID    amLODIPine  5 mg Oral Daily    azithromycin  500 mg Intravenous Q24H    cefTRIAXone (ROCEPHIN) IV  1 g Intravenous Q24H    atorvastatin  10 mg Oral Daily    clopidogrel  75 mg Oral Daily    donepezil  10 mg Oral Nightly    pantoprazole  40 mg Oral QAM AC    levothyroxine  100 mcg Oral Daily    losartan  50 mg Oral Daily    miconazole   Topical BID    pregabalin  75 mg Oral Nightly    tiotropium  2 puff Inhalation Daily    sodium chloride flush  10 mL Intravenous 2 times per day    enoxaparin  40 mg Subcutaneous Daily     Recent Labs     05/25/19  2101 05/27/19  0246   WBC 9.5 8.7   HGB 10.4* 9.8*    277     Recent Labs     05/25/19  2101 05/27/19  0246 05/28/19  0111    141 138   K 3.5 3.1* 4.3    103 104   CO2 19* 24 23   BUN 16 10 13   CREATININE 0.7 0.6 0.7   GLUCOSE 109 78 123*     Recent Labs     05/25/19  2101 05/27/19  0246   AST 14 12   ALT 6 <5*   BILITOT <0.2 <0.2   ALKPHOS 87 76       Objective:   Vitals: BP (!) 104/59   Pulse 65   Temp 97.6 °F (36.4 °C) (Temporal)   Resp 14   Ht 5' (1.524 m)   Wt 97 lb 7 oz (44.2 kg)   SpO2 94%   BMI 19.03 kg/m²   General appearance: alert and cooperative with exam  Lungs: Bilateral rhonci  Heart: regular rate and rhythm, S1, S2 normal, no murmur, click, rub or gallop  Abdomen: soft, non-tender; bowel sounds normal; no masses,  no organomegaly  Extremities: Left foot edema. Bilateral decreased pulses. Neurologic: No obvious focal neurologic deficits.     Assessment and Plan:   1. Right sided pneumonia/?mass: IV abx have been given and Dr Anisa Whaley has seen her. 2. COPD exac: Cont nebs and abx. 3. Diastolic CHF: Dr Marina Sanon is seeing. 4. Bradycardia: Dr Marina Sanon is seeing. 5. PVD: She has significant decreased flow to her feet. She is being followed as an outpatient by [facility]. I will add a low dose aspirin. 6. Encephalopathy: This is due to pneumonia and chronic issues. 7. Hypokalemia: Repleted. Advance Directive: Full Code  DVT prophylaxis with enoxaparin 40 mg sub-Q daily. Discharge planning: ? Active Problems:    CAD (coronary artery disease)    Pneumonia  Resolved Problems:    * No resolved hospital problems.  Ahmet See MD

## 2019-05-28 NOTE — PROGRESS NOTES
[address]    Daily HOSPITAL Progress Note                            Date:  5/27/19  Patient: Gordon Katz  Admission:  5/25/2019  8:22 PM  Admit DX: Pneumonia [J18.9]  Age:  80 y.o., 1935        Date of Admission 5/25/2019  8:22 PM   Hospital Length of Stay  LOS: 2 days            I personally saw the patient and rounded with:  Luciana Buerger RN on 5/27/19      The observations documented in this note, including the assessment and plan are mine         Reason for initial evaluation or the patient's initial complaint    bradycardia      SUBJECTIVE:      Chief Complaint / Reason for the Visit   Follow up of:  bradycardia and CAD and systemic arterial hypertension    Family present and in room during examination:  Yes: a daughter I assume      Specialty Problems        Cardiology Problems    Hypertension        Cerebrovascular disease        AAA (abdominal aortic aneurysm) without rupture ([address]        Carotid artery stenosis        Acute ST elevation myocardial infarction (STEMI) involving left anterior descending (LAD) coronary artery ([address]        Stenosis of right carotid artery        Vascular disease        CAD (coronary artery disease)        PVD (peripheral vascular disease) ([address]        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 Gordon Katz is generally feeling unchanged. No new problems    Ms. Gordon Katz has the following cardiac complaints / symptoms today:    1. noctural bradycadia, rate yesterday was in the 50s    2. CAD, no chest pain    3.  Hypertension    The blood pressure for the lastr 36 hours has been:  Systolic (02FCI), EVV:100 , Min:83 , WFB:728    Diastolic (30ABC), MFU:57, Min:40, Max:57        Sondra FERN Sadiq Flores is a 80 y.o. female with the following history as recorded in [facility]:    Patient Active Problem List    Diagnosis Date 5/28/2019] amLODIPine (NORVASC) tablet 5 mg  5 mg Oral Daily Kimberlyn Pineda MD        azithromycin (ZITHROMAX) 500 mg in D5W 250ml Vial Mate  500 mg Intravenous Q24H Kimberlyn Pineda MD   Stopped at 05/27/19 0028    cefTRIAXone (ROCEPHIN) 1 g in sodium chloride (PF) 10 mL IV syringe  1 g Intravenous Q24H Kimberlyn Pineda MD   1 g at 05/26/19 2208    atorvastatin (LIPITOR) tablet 10 mg  10 mg Oral Daily Kimberlyn Pineda MD   10 mg at 05/27/19 0953    clopidogrel (PLAVIX) tablet 75 mg  75 mg Oral Daily Kimberlyn Pineda MD   75 mg at 05/27/19 0955    donepezil (ARICEPT ODT) disintegrating tablet 10 mg  10 mg Oral Nightly Kimberlyn Pineda MD   10 mg at 05/26/19 2209    pantoprazole (PROTONIX) tablet 40 mg  40 mg Oral QAM AC Kimberlyn Pineda MD   40 mg at 05/27/19 0554    levalbuterol (XOPENEX HFA) inhaler 2 puff  2 puff Inhalation Q4H PRN Kimberlyn Pineda MD        levothyroxine (SYNTHROID) tablet 100 mcg  100 mcg Oral Daily Kimberlyn Pineda MD   100 mcg at 05/27/19 0553    losartan (COZAAR) tablet 50 mg  50 mg Oral Daily Kimberlyn Pineda MD   50 mg at 05/27/19 0955    nitroGLYCERIN (NITROSTAT) SL tablet 0.4 mg  0.4 mg Sublingual Q5 Min PRN Kimberlyn Pineda MD        miconazole (MICOTIN) 2 % powder   Topical BID Kimberlyn Pineda MD        pregabalin (LYRICA) capsule 75 mg  75 mg Oral Nightly Kimberlyn Pineda MD   75 mg at 05/26/19 2209    tiotropium (SPIRIVA RESPIMAT) 2.5 MCG/ACT inhaler 2 puff  2 puff Inhalation Daily Kimberlyn Pineda MD   2 puff at 05/27/19 1026    sodium chloride flush 0.9 % injection 10 mL  10 mL Intravenous 2 times per day Kimberlyn Pineda MD   10 mL at 05/27/19 1027    sodium chloride flush 0.9 % injection 10 mL  10 mL Intravenous PRN Kimberlyn Pineda MD        acetaminophen (TYLENOL) tablet 650 mg  650 mg Oral Q4H PRN Kimberlyn Pineda MD        enoxaparin (LOVENOX) injection 40 mg  40 mg Subcutaneous Daily Kimberlyn Pineda MD   40 mg at 05/27/19 4478     Allergies: Morphine  Past Medical History:   Diagnosis Date    AAA (abdominal aortic aneurysm) without rupture [facility] 3/25/2013    Arteriosclerotic heart disease     Coronary artery disease with LAD x2 drug-eluting stents.  Arthritis     Atherosclerosis of native arteries of the extremities with intermittent claudication     Atherosclerosis of native arteries of the extremities with intermittent claudication     CAD (coronary artery disease) 2009    Carotid artery occlusion 2009    Cerebrovascular disease     Heart attack ([address] 12/11/2015    History of blood transfusion     History of cardiac murmur     Hyperlipidemia 2009    Cholesterol management per Diane Enriquez M.D.   [facility] Hypertension 2009    Hypothyroidism     Multiple sclerosis ([facility]     Seizure disorder ([facility]     Possible    Thyroid disease 2009    Tobacco abuse, continuous 2009     Past Surgical History:   Procedure Laterality Date    ABDOMINAL AORTIC ANEURYSM REPAIR  [facility]/12.14.09    5.2 CM AAA; 14MM INTERPOSITIONAL DACRON GRAFT    APPENDECTOMY      CARDIAC CATHETERIZATION  06/11/2002    EF60%     CARDIAC [facility]HE[facility]  [facility]17[facility]15  [facility]    EF 70%    CARDIAC CATHETERIZATION  12/11/2015    EF 60%, x2 drug-eluting stents LAD.  CARDIAC VALVE SURGERY  [facility]/12.11.09    & R/O    CAROTID ENDARTERECTOMY Right 1/11/2016    CAROTID ENDARTERECTOMY WITH EEG  performed by Traci Staples MD at [address] [place] [address]      DIAGNOSTIC CARDIAC CATH LAB PROCEDURE  6/2/03    EF over 60% Normal LV function and hemodynamics, Mild nonocclusive CAD, w/o hemodynamically significant  lesions  identified     DIAGNOSTIC CARDIAC CATH LAB PROCEDURE  2/24/12    2 stents    ENDOSCOPY, COLON, DIAGNOSTIC      EYE SURGERY Bilateral     cataracts    FOOT SURGERY Right     exc. lesion/bx.     FRACTURE SURGERY Right     elbow    OVARIAN CYST SURGERY      THYROID SURGERY      VASCULAR SURGERY  05- [facility]    Percutaneous Transluminal Arterial Angioplasty of left superficial femoral artery using a 6*2 cutting balloon reducing a 90% stenosis to 0% stenosis    VASCULAR SURGERY  12/11/2015 [facility]    Procedure: aborted induction for carotis endarterectomy    VASCULAR SURGERY  1/11/2015 [facility]    Procedure: right CEA, eversion technique     Family History   Problem Relation Age of Onset    Heart Attack Mother     Heart Attack Father     Coronary Art Dis Father     Other Other         AAA/2009    Other Sister         AAA/2009     Social History     Tobacco Use    Smoking status: Current Every Day Smoker     Packs/day: 2.00     Years: 50.00     Pack years: 100.00    Smokeless tobacco: Never Used   Substance Use Topics    Alcohol use: No          Review of Systems:    General:      Complaint / Symptom Yes / No / Description if Yes       Fatigue Yes:  chronic   Weight gain NA   Insomnia NA       Respiratory:        Complaint / Symptom Yes / No / Description if Yes       Cough No   Horseness NA       Cardiovascular:    Complaint / Symptom Yes / No / Description if Yes       Chest Pain No   Shortness of Air / Orthopnea Yes: chronic and stable   Presyncope / Syncope No   Palpitations No         Objective:    BP (!) 105/55   Pulse 62   Temp 97.2 °F (36.2 °C) (Temporal)   Resp 16   Wt 97 lb 7 oz (44.2 kg)   SpO2 95%   BMI 16.73 kg/m² ,     Intake/Output Summary (Last 24 hours) at 5/27/2019 1956  Last data filed at 5/27/2019 1823  Gross per 24 hour   Intake 440 ml   Output --   Net 440 ml       GENERAL - well developed and well nourished, is an active participant in this examination  HEENT -  PERRLA, Hearing appears normal, conjunctiva and lids are normal, ears and nose appear normal  NECK - no thyromegaly, no JVD, trachea is in the midline  CARDIOVASCULAR - PMI is in the left mid line clavicular position, Normal S1 and S2 with a grade 1/6 systolic murmur. No S3 or S4    PULMONARY - No respiratory distress. scattered wheezes and rales.   Breath sounds in both  lung fields are Decreased  ABDOMEN  - soft, non tender, no rebound, no hepatomegaly or splenomegaly  MUSCULOSKELETAL  - Prone/Supine, digitals and nails are without clubbing or cyanosis  EXTREMITIES - trace edema  NEUROLOGIC - cranial nerves, II-XII, are normal  SKIN - turgor is normal, no rash  PSYCHIATRIC - normal mood and affect, alert and orientated x 3, judgement and insight appear appropriate      ASSESSMENT:    ALL THE CARDIOLOGY PROBLEMS ARE LISTED ABOVE; HOWEVER, THE FOLLOWING SPECIFIC CARDIAC PROBLEMS / CONDITIONS WERE ADDRESSED AND TREATED DURING THE OFFICE VISIT TODAY:                                                                                            MEDICAL DECISION MAKING                      Cardiac Specific Problem / Diagnosis   Discussion and Data Reviewed Diagnostic Procedures Ordered Management Options Selected                 1. Presenting problem / symptom     nocturnal bradycardia  show no change    Seems to be assymptomatic    Pulse Readings from Last 1 Encounters:   05/27/19 62     No Continue current medications:      Yes:                  2. CAD Initial presentation during this evaluation    Review and summation of old records:        6/14/2002  Cath  Severe disease in the RCA, two KELY, normal LVFX  6/2/2003  Cath  Mild CAD, normal LVFX  12/6/2006  DSE clinically positive  10/17/2007  DSE negative for myocardial ischemia  10/16/2008  DSE negative for myocardial ischemia  10/21/2009  DSE clinically positive  2/24/2012  Echo normal LVFX  2/24/2012  Cath  Moderate CAD, patent stents, normal LVFX  5/21/2015  lexiscan Positive for apical myocardial ischemia, EF 82%  6/17/2015  Cath  Moderate disease in the LAD, patent stents, normal LVFX  12/11/2015  Cath KELY x 2 to proximal LAD, normal LVFX  5/26/2019  Echo normal LVFX          No Continue current medications:     Yes:                  3.  Systemic arterial hypertension Initial presentation during this evaluation The blood pressure for the lastr 36 hours has been:  Systolic (20ZEY), TFF:537 , Min:83 , OYR:944    Diastolic (26WFB), ARTIE:84, Min:40, Max:57       No Continue current medications:        yes         CONSIDERATIONS, THOUGHTS, AND PLANS:    1. Continue present medications except for changes as noted above  2. Continue to monitor rhythm  3. Further orders per clinical course. Discussed with patient and family and nursing.     Electronically signed by Rodrigo Barrera MD on 5/27/19        [id number] [facility]

## 2019-05-28 NOTE — DISCHARGE INSTR - COC
Continuity of Care Form    Patient Name: Bk Barrera   :  1935  MRN:  [id number]    Admit date:  2019  Discharge date:  ***    Code Status Order: Full Code   Advance Directives:   Advance Care Flowsheet Documentation     Date/Time Healthcare Directive Type of Healthcare Directive Copy in [address] Agent's Name Healthcare Agent's Phone Number    [phone]  Other (Comment)  Living will  No, copy requested from family  --  --  --          Admitting Physician:  Brandon Zapata MD  PCP: Brandon Zapata MD    Discharging Nurse: [facility] Unit/Room#: 6641/912-11  Discharging Unit Phone Number: ***    Emergency Contact:   Extended Emergency Contact Information  Primary Emergency Contact: Darrentaty  Address: [address] [facility], [address] Phone: [phone]  Mobile Phone: [phone]  Relation: Child  Secondary Emergency Contact: Anjelica [facility] [address] [facility] Phone: [phone]  Relation: Other    Past Surgical History:  Past Surgical History:   Procedure Laterality Date    ABDOMINAL AORTIC ANEURYSM REPAIR  TJR/09    5.2 CM AAA; 14MM INTERPOSITIONAL DACRON GRAFT    APPENDECTOMY      CARDIAC CATHETERIZATION  2002    EF60%     CARDIAC [facility]  [facility]    EF 70%    CARDIAC CATHETERIZATION  2015    EF 60%, x2 drug-eluting stents LAD.  CARDIAC VALVE SURGERY  TJR/09    & R/O    CAROTID ENDARTERECTOMY Right 2016    CAROTID ENDARTERECTOMY WITH EEG  performed by Traci Staples MD at [address] [place] [address]      DIAGNOSTIC CARDIAC CATH LAB PROCEDURE  03    EF over 60% Normal LV function and hemodynamics, Mild nonocclusive CAD, w/o hemodynamically significant  lesions  identified     DIAGNOSTIC CARDIAC CATH LAB PROCEDURE  12    2 stents    ENDOSCOPY, COLON, DIAGNOSTIC      EYE SURGERY Bilateral     cataracts    FOOT SURGERY Right     exc. lesion/bx.  FRACTURE SURGERY Right     elbow    OVARIAN CYST SURGERY      THYROID SURGERY      VASCULAR SURGERY  05- TJR    Percutaneous Transluminal Arterial Angioplasty of left superficial femoral artery using a 6*2 cutting balloon reducing a 90% stenosis to 0% stenosis    VASCULAR SURGERY  12/11/2015 [facility]    Procedure: aborted induction for carotis endarterectomy    VASCULAR SURGERY  1/11/2015 [facility]    Procedure: right CEA, eversion technique       Immunization History: There is no immunization history on file for this patient.     Active Problems:  Patient Active Problem List   Diagnosis Code    Hyperlipidemia E78.5    Hypertension I10    Hypothyroidism E03.9    Atherosclerosis of native artery of extremity with intermittent claudication ([facility] I70.219    Multiple sclerosis ([facility] G35    Seizure disorder ([facility] G40.909    Cerebrovascular disease I67.9    Carotid artery stenosis I65.29    AAA (abdominal aortic aneurysm) without rupture ([facility]) I71.4    Right carotid bruit R09.89    Smoker F17.200    Chronic respiratory failure ([facility]) J96.10    Acute ST elevation myocardial infarction (STEMI) involving left anterior descending (LAD) coronary artery ([facility]) I21.02    Stenosis of right carotid artery I65.21    Vascular disease I99.9    2 KELY to LAD Z95.5    CAD (coronary artery disease) I25.10    Altered mental state R41.82    Tremor R25.1    Fatigue R53.83    Status post insertion of drug-eluting stent into left anterior descending artery Z95.5    H/O cardiac murmur Z86.79    Medication management Z79.899    PVD (peripheral vascular disease) ([facility]) I73.9    Atherosclerosis of native artery of left lower extremity with ulceration of midfoot ([facility]) I70.244    Pneumonia J18.9       Isolation/Infection:   Isolation          No Isolation            Nurse Assessment:  Last Vital Signs: BP 98/60   Pulse 65   Temp 97.3 °F (36.3 °C) (Temporal)   Resp 16   Ht 5' (1.524 m)   Wt 97 lb 7 oz Treatments: ***    Patient's personal belongings (please select all that are sent with patient):  {CHP DME Belongings:167823021}    RN SIGNATURE:  {Esignature:655896960}    CASE MANAGEMENT/SOCIAL WORK SECTION    Inpatient Status Date: ***    Readmission Risk Assessment Score:  Readmission Risk              Risk of Unplanned Readmission:        17           Discharging to Facility/ Agency   · Name:   · Address:  · Phone:  · Fax:    Dialysis Facility (if applicable)   · Name:  · Address:  · Dialysis Schedule:  · Phone:  · Fax:    / signature: {Esignature:374286320}    PHYSICIAN SECTION    Prognosis: {Prognosis:8734082897}    Condition at Discharge: [address] [facility] Patient Condition:509894355}    Rehab Potential (if transferring to Rehab): {Prognosis:9954092126}    Recommended Labs or Other Treatments After Discharge: ***    Physician Certification: I certify the above information and transfer of Eileen Boswell  is necessary for the continuing treatment of the diagnosis listed and that she requires {Admit to Appropriate Level of Care:39930} for {GREATER/LESS:930923298} 30 days.      Update Admission H&P: {CHP DME Changes in EDOXT:761166362}    PHYSICIAN SIGNATURE:  {Esignature:766918042}

## 2019-05-30 ENCOUNTER — OFFICE VISIT (OUTPATIENT)
Dept: CARDIOLOGY | Age: 84
End: 2019-05-30
Payer: MEDICARE

## 2019-05-30 ENCOUNTER — TELEPHONE (OUTPATIENT)
Dept: VASCULAR SURGERY | Age: 84
End: 2019-05-30

## 2019-05-30 VITALS
HEIGHT: 60 IN | HEART RATE: 72 BPM | BODY MASS INDEX: 18.65 KG/M2 | WEIGHT: 95 LBS | SYSTOLIC BLOOD PRESSURE: 126 MMHG | DIASTOLIC BLOOD PRESSURE: 72 MMHG

## 2019-05-30 DIAGNOSIS — Z95.5 HISTORY OF CORONARY ARTERY STENT PLACEMENT: ICD-10-CM

## 2019-05-30 DIAGNOSIS — I25.10 CORONARY ARTERY DISEASE INVOLVING NATIVE CORONARY ARTERY OF NATIVE HEART WITHOUT ANGINA PECTORIS: Primary | ICD-10-CM

## 2019-05-30 DIAGNOSIS — I05.0 MITRAL VALVE STENOSIS, UNSPECIFIED ETIOLOGY: ICD-10-CM

## 2019-05-30 DIAGNOSIS — E78.2 MIXED HYPERLIPIDEMIA: ICD-10-CM

## 2019-05-30 DIAGNOSIS — I10 ESSENTIAL HYPERTENSION: ICD-10-CM

## 2019-05-30 DIAGNOSIS — I51.89 DIASTOLIC DYSFUNCTION: ICD-10-CM

## 2019-05-30 LAB
BLOOD CULTURE, ROUTINE: NORMAL
CULTURE, BLOOD 2: NORMAL

## 2019-05-30 PROCEDURE — 1090F PRES/ABSN URINE INCON ASSESS: CPT | Performed by: NURSE PRACTITIONER

## 2019-05-30 PROCEDURE — 99213 OFFICE O/P EST LOW 20 MIN: CPT | Performed by: NURSE PRACTITIONER

## 2019-05-30 PROCEDURE — G8420 CALC BMI NORM PARAMETERS: HCPCS | Performed by: NURSE PRACTITIONER

## 2019-05-30 PROCEDURE — 4004F PT TOBACCO SCREEN RCVD TLK: CPT | Performed by: NURSE PRACTITIONER

## 2019-05-30 PROCEDURE — 4040F PNEUMOC VAC/ADMIN/RCVD: CPT | Performed by: NURSE PRACTITIONER

## 2019-05-30 PROCEDURE — 1123F ACP DISCUSS/DSCN MKR DOCD: CPT | Performed by: NURSE PRACTITIONER

## 2019-05-30 PROCEDURE — G8598 ASA/ANTIPLAT THER USED: HCPCS | Performed by: NURSE PRACTITIONER

## 2019-05-30 PROCEDURE — G8400 PT W/DXA NO RESULTS DOC: HCPCS | Performed by: NURSE PRACTITIONER

## 2019-05-30 PROCEDURE — G8427 DOCREV CUR MEDS BY ELIG CLIN: HCPCS | Performed by: NURSE PRACTITIONER

## 2019-05-30 PROCEDURE — 1111F DSCHRG MED/CURRENT MED MERGE: CPT | Performed by: NURSE PRACTITIONER

## 2019-05-30 NOTE — PROGRESS NOTES
disease I67.9    Carotid artery stenosis I65.29    AAA (abdominal aortic aneurysm) without rupture ([facility]) I71.4    Right carotid bruit R09.89    Smoker F17.200    Chronic respiratory failure ([facility]) J96.10    Acute ST elevation myocardial infarction (STEMI) involving left anterior descending (LAD) coronary artery ([facility]) I21.02    Stenosis of right carotid artery I65.21    Vascular disease I99.9    2 KELY to LAD Z95.5    CAD (coronary artery disease) I25.10    Altered mental state R41.82    Tremor R25.1    Fatigue R53.83    Status post insertion of drug-eluting stent into left anterior descending artery Z95.5    H/O cardiac murmur Z86.79    Medication management Z79.899    PVD (peripheral vascular disease) ([facility]) I73.9    Atherosclerosis of native artery of left lower extremity with ulceration of midfoot ([facility]) I70.244    Pneumonia J18.9     Past Medical History:   Diagnosis Date    AAA (abdominal aortic aneurysm) without rupture ([facility] 3/25/2013    Arteriosclerotic heart disease     Coronary artery disease with LAD x2 drug-eluting stents.      Arthritis     Atherosclerosis of native arteries of the extremities with intermittent claudication     Atherosclerosis of native arteries of the extremities with intermittent claudication     CAD (coronary artery disease) 2009    Carotid artery occlusion 2009    Cerebrovascular disease     Heart attack ([address] 12/11/2015    History of blood transfusion     History of cardiac murmur     Hyperlipidemia 2009    Cholesterol management per Renee Brown Hypertension 2009    Hypothyroidism     Multiple sclerosis ([address]     Seizure disorder ([address]     Possible    Thyroid disease 2009    Tobacco abuse, continuous 2009     Past Surgical History:   Procedure Laterality Date    ABDOMINAL AORTIC ANEURYSM REPAIR  TJR/12.14.09    5.2 CM AAA; 14MM INTERPOSITIONAL DACRON GRAFT    APPENDECTOMY      CARDIAC CATHETERIZATION  06/11/2002    EF60%     CARDIAC CATHETERIZATION  6/17/15  [address]    EF 70%    CARDIAC CATHETERIZATION  12/11/2015    EF 60%, x2 drug-eluting stents LAD.  CARDIAC VALVE SURGERY  TJR/12.11.09    & R/O    CAROTID ENDARTERECTOMY Right 1/11/2016    CAROTID ENDARTERECTOMY WITH EEG  performed by Darlene Farias MD at [address] [place] [address]      DIAGNOSTIC CARDIAC CATH LAB PROCEDURE  6/2/03    EF over 60% Normal LV function and hemodynamics, Mild nonocclusive CAD, w/o hemodynamically significant  lesions  identified     DIAGNOSTIC CARDIAC CATH LAB PROCEDURE  2/24/12    2 stents    ENDOSCOPY, COLON, DIAGNOSTIC      EYE SURGERY Bilateral     cataracts    FOOT SURGERY Right     exc. lesion/bx.  FRACTURE SURGERY Right     elbow    OVARIAN CYST SURGERY      THYROID SURGERY      VASCULAR SURGERY  05- [facility]    Percutaneous Transluminal Arterial Angioplasty of left superficial femoral artery using a 6*2 cutting balloon reducing a 90% stenosis to 0% stenosis    VASCULAR SURGERY  12/11/2015 [facility]    Procedure: aborted induction for carotis endarterectomy    VASCULAR SURGERY  1/11/2015 [facility]    Procedure: right CEA, eversion technique     Family History   Problem Relation Age of Onset    Heart Attack Mother     Heart Attack Father     Coronary Art Dis Father     Other Other         AAA/2009    Other Sister         AAA/2009     Social History     Tobacco Use    Smoking status: Current Every Day Smoker     Packs/day: 2.00     Years: 50.00     Pack years: 100.00    Smokeless tobacco: Never Used   Substance Use Topics    Alcohol use: No      Current Outpatient Medications   Medication Sig Dispense Refill    aspirin 81 MG EC tablet Take 1 tablet by mouth daily 30 tablet 3    amLODIPine (NORVASC) 5 MG tablet Take 1 tablet by mouth daily 30 tablet 5    cefdinir (OMNICEF) 300 MG capsule Take 1 capsule by mouth 2 times daily for 7 days 14 capsule 0    nystatin (MYCOSTATIN) 683276 UNIT/GM powder Apply topically 4 times daily.  60 g 0    potassium chloride (KLOR-CON M) 20 MEQ TBCR extended release tablet Take 1 tablet by mouth daily 30 tablet 5    donepezil (ARICEPT ODT) 10 MG disintegrating tablet Take 10 mg by mouth nightly      furosemide (LASIX) 40 MG tablet Take 40 mg by mouth daily      IRON PO Take by mouth      B Complex Vitamins (VITAMIN B COMPLEX PO) Take by mouth      losartan (COZAAR) 50 MG tablet TAKE 1 TABLET DAILY 90 tablet 1    levalbuterol (XOPENEX HFA) 45 MCG/ACT inhaler Inhale 1-2 puffs into the lungs every 4 hours as needed for Wheezing      tiotropium (SPIRIVA) 18 MCG inhalation capsule Inhale 18 mcg into the lungs daily      clopidogrel (PLAVIX) 75 MG tablet Take 1 tablet by mouth daily Indications: Cadmium Poisoning 30 tablet 5    nitroGLYCERIN (NITROSTAT) 0.4 MG SL tablet Place 1 tablet under the tongue every 5 minutes as needed for Chest pain. 25 tablet 3    esomeprazole (NEXIUM) 40 MG capsule Take 40 mg by mouth every morning (before breakfast).  atorvastatin (LIPITOR) 10 MG tablet Take 10 mg by mouth daily.  Levothyroxine Sodium (SYNTHROID PO) Take 0.1 mcg by mouth.  pregabalin (LYRICA) 75 MG capsule Take 75 mg by mouth nightly. No current facility-administered medications for this visit. Allergies: Morphine    Review of Systems  Constitutional - no appetite change, or unexpected weight change. No fever, chills or diaphoresis. + fatigue with history of chronic anemia. HEENT - no significant rhinorrhea or epistaxis. No tinnitus or significant hearing loss. Eyes - no sudden vision change or amaurosis. No corneal arcus, xantholasma, subconjunctival hemorrhage or discharge. Respiratory - no significant wheezing, stridor, apnea or cough. No dyspnea on exertion or shortness of air. Cardiovascular - no exertional chest pain to suggest myocardial ischemia. No orthopnea or PND. No sensation of sustained arrythmia. No occurrence of slow heart rate. No palpitations. No claudication.   No leg processes coherent. No apparent tremor. Oriented to person, place and time. Psychiatric -  Appropriate affect and mood. Assessment:     Diagnosis Orders   1. Coronary artery disease involving native coronary artery of native heart without angina pectoris     2. Essential hypertension     3. 2 KELY to LAD     4. Mixed hyperlipidemia     5. Diastolic dysfunction     6.  Mitral valve stenosis, unspecified etiology       Data reviewed:    Lab Results   Component Value Date    WBC 8.7 05/27/2019    HGB 9.8 (L) 05/27/2019    HCT 31.0 (L) 05/27/2019    MCV 93.7 05/27/2019     05/27/2019     Lab Results   Component Value Date     05/28/2019     02/20/2012    K 4.3 05/28/2019    K 4.0 02/20/2012     05/28/2019     02/20/2012    CO2 23 05/28/2019    BUN 13 05/28/2019    CREATININE 0.7 05/28/2019    CREATININE 0.6 02/20/2012    GLUCOSE 123 05/28/2019    CALCIUM 8.1 05/28/2019 5/26/19   Conclusions    Summary   Moderate concentric left ventricular hypertrophy.   Normal left ventricular size with preserved LV function and an estimated   ejection fraction of approximately 55-60%.   Psuedonormalized filling pattern consistent with Grade II diastolic   filling pattern.    Signature    ----------------------------------------------------------------   Electronically signed by Farheen Espinal MD(Interpreting   physician) on 05/26/2019 01:38 PM   ----------------------------------------------------------------   Findings    Mitral Valve   Mitral annular calcification is present.   Mitral valve leaflets are mildly thickened.   Mild mitral regurgitation is present.   Decreased mitral valve mobility noted.   Mitral stenosis is present.    Aortic Valve   Mildly thickened aortic valve leaflets with preserved leaflet mobility.   Aortic valve appears to be tricuspid.   No evidence of aortic valve regurgitation .    Tricuspid Valve   There is mild tricuspid regurgitation    Pulmonic Valve   The disease risk factors you can control:  Smoking, high blood pressure, high cholesterol, diabetes, being overweight, lack of exercise and stress. Continue heart healthy diet. Take medications as directed. Exercise as tolerated. Strive for 15 minutes of exercise most days of the week. If asked to keep a blood pressure log, do so for 2 weeks. Call the office to report readings at [phone]. Blood pressure goal is 140/90 or less. If you are a diabetic, the goal is 130/80 or less. If you are taking cholesterol lowering medications, it is recommended that lab work be checked annually. Always keep a current medication list.  Bring your medications to every office visit.       ALLEN Mills

## 2019-05-30 NOTE — TELEPHONE ENCOUNTER
Per Rose Pool PA-C, need to post-pone angiogram with Dr. Aquiles Mustafa on 6/3/19 for a week later due to recent hospitalization for pneumonia. Spoke with patient's son Lakeshia Harris, informed him that due to her recent hospitalization for pneumonia that medically we need to post-pone her angiogram at [facility] on 6/3/19 with Dr. Aquiles Mustafa for a week later. Details/instructions and medications were addressed (see letter) for rescheduled angiogram at [facility] on 6/10/19 with Dr. Aquiles Mustafa. She will need to arrive at the [address] [place] [address] at 8:30am. She will need a  to take her home. Ruthy Seth verbalizes understanding of details/instructions. Written instructions were mailed to the patient.

## 2019-05-30 NOTE — PATIENT INSTRUCTIONS
Continue current medications as prescribed. Continue to follow up with primary care provider for non cardiac medical problems. Call the office with any problems, questions or concerns at [phone]. Follow up as scheduled with your cardiologist. Dr. Sandy Mendiolaasant 3 months. The following educational material has been included in this after visit summary for your review: Life simple 7. Heart health.     Additional instructions:  Coronary artery disease risk factors you can control: Smoking, high blood pressure, high cholesterol, diabetes, being overweight, lack of exercise and stress. Continue heart healthy diet. Take medications as directed. Exercise as tolerated. Strive for 15 minutes of exercise most days of the week. If asked to keep a blood pressure log, do so for 2 weeks. Call the office to report readings at [phone]. Blood pressure goal  is less than 120/70. Elevated blood pressure at 120-129/80 or less. High blood pressure at 130-139/80-89. If you are taking cholesterol lowering medications, it is recommended that lab work be checked annually. Always keep a current medication list. Bring your medications to every office visit. Life simple 7  1) Manage blood pressure - high blood pressure is a major risk factor for heart disease and stroke. Keeping blood pressure in health range reduces strain on your heart, arteries and kidneys. 2) Control cholesterol - contributes to plaque, which can clog arteries and lead to heart disease and stroke. When you control your cholesterol you are giving your arteries their best chance to remain clear. 3) Reduce blood sugar - most of the food we eat is turning into glucose or blood sugar that our body uses for energy. Over time, high levels of blood sugar can damage your heart, kidneys, eyes and nerves. 4) Get active - living an active life is one of the most rewarding gifts you can give yourself and those you love.   Simply put, daily physical activity increases your length and quality of life. 5)  Eat better - A healthy diet is one of your best weapons for fighting cardiovascular disease. When you eat a heart healthy diet, you improve your chances for feeling good and staying healthy for life. 6)  Lose weight - when you shed extra fat an unnecessary pounds, you reduce the burden on your hear, lungs, blood vessels and skeleton. You give yourself the gift of active living, you lower your blood pressure and help yourself feel better. 7) Stop smoking - cigarette smokers have a higher risk of developing cardiovascular disease. If  You smoke, quitting is the best thing you can do for your health. Check American Heart Association on line for more information on Life's Simple 7 and tips for healthy living.       Patient Education        A Healthy Heart: Care Instructions  Your Care Instructions    Heart disease occurs when a substance called plaque builds up in the vessels that supply oxygen-rich blood to your heart. This can narrow the blood vessels and reduce blood flow. A heart attack happens when blood flow is completely blocked. A high-fat diet, smoking, and other factors increase the risk of heart disease. Your doctor has found that you have a chance of having heart disease. You can do lots of things to keep your heart healthy. It may not be easy, but you can change your diet, exercise more, and quit smoking. These steps really work to lower your chance of heart disease. Follow-up care is a key part of your treatment and safety. Be sure to make and go to all appointments, and call your doctor if you are having problems. It's also a good idea to know your test results and keep a list of the medicines you take. How can you care for yourself at home? Diet    · Use less salt when you cook and eat. This helps lower your blood pressure. Taste food before salting. Add only a little salt when you think you need it.  With time, your taste buds will adjust to less

## 2019-05-31 ENCOUNTER — PROCEDURE VISIT (OUTPATIENT)
Dept: PULMONOLOGY | Facility: CLINIC | Age: 84
End: 2019-05-31

## 2019-05-31 ENCOUNTER — OFFICE VISIT (OUTPATIENT)
Dept: PULMONOLOGY | Facility: CLINIC | Age: 84
End: 2019-05-31

## 2019-05-31 VITALS
HEIGHT: 59 IN | BODY MASS INDEX: 18.55 KG/M2 | OXYGEN SATURATION: 94 % | SYSTOLIC BLOOD PRESSURE: 100 MMHG | WEIGHT: 92 LBS | DIASTOLIC BLOOD PRESSURE: 80 MMHG | HEART RATE: 56 BPM

## 2019-05-31 DIAGNOSIS — J30.9 ALLERGIC RHINITIS, UNSPECIFIED SEASONALITY, UNSPECIFIED TRIGGER: ICD-10-CM

## 2019-05-31 DIAGNOSIS — J44.9 STAGE 3 SEVERE COPD BY GOLD CLASSIFICATION (HCC): Primary | ICD-10-CM

## 2019-05-31 DIAGNOSIS — J44.9 STAGE 2 MODERATE COPD BY GOLD CLASSIFICATION (HCC): ICD-10-CM

## 2019-05-31 DIAGNOSIS — R91.1 LESION OF RIGHT LUNG: ICD-10-CM

## 2019-05-31 DIAGNOSIS — R06.00 DYSPNEA, UNSPECIFIED TYPE: Primary | ICD-10-CM

## 2019-05-31 DIAGNOSIS — R63.6 UNDERWEIGHT: ICD-10-CM

## 2019-05-31 LAB
DIFF CAP.CO: NORMAL ML/MMHG SEC
FEV1/FVC: 66.53 %
FEV1: NORMAL LITERS
FVC VOL RESPIRATORY: NORMAL LITERS

## 2019-05-31 PROCEDURE — 99214 OFFICE O/P EST MOD 30 MIN: CPT | Performed by: NURSE PRACTITIONER

## 2019-05-31 PROCEDURE — 94010 BREATHING CAPACITY TEST: CPT | Performed by: NURSE PRACTITIONER

## 2019-05-31 PROCEDURE — 94729 DIFFUSING CAPACITY: CPT | Performed by: NURSE PRACTITIONER

## 2019-05-31 RX ORDER — ERGOCALCIFEROL 1.25 MG/1
CAPSULE ORAL
COMMUNITY
Start: 2019-03-08

## 2019-05-31 RX ORDER — ASPIRIN 81 MG/1
81 TABLET ORAL
COMMUNITY
Start: 2019-05-28

## 2019-05-31 ASSESSMENT — PULMONARY FUNCTION TESTS: FEV1/FVC: 66.53

## 2019-05-31 NOTE — PATIENT INSTRUCTIONS
High-Protein and High-Calorie Diet  Eating high-protein and high-calorie foods can help you to gain weight, heal after an injury, and recover after an illness or surgery.  What is my plan?  The specific amount of daily protein and calories you need depends on:  Your body weight.  The reason this diet is recommended for you.    Generally, a high-protein, high-calorie diet involves:  Eating 250-500 extra calories each day.  Making sure that 10-35% of your daily calories come from protein.    Talk to your health care provider about how much protein and how many calories you need each day. Follow the diet as directed by your health care provider.  What do I need to know about this diet?  Ask your health care provider if you should take a nutritional supplement.  Try to eat six small meals each day instead of three large meals.  Eat a balanced diet, including one food that is high in protein at each meal.  Keep nutritious snacks handy, such as nuts, trail mixes, dried fruit, and yogurt.  If you have kidney disease or diabetes, eating too much protein may put extra stress on your kidneys. Talk to your health care provider if you have either of those conditions.  What are some high-protein foods?  Grains  Quinoa. Bulgur wheat.  Vegetables  Soybeans. Peas.  Meats and Other Protein Sources  Beef, pork, and poultry. Fish and seafood. Eggs. Tofu. Textured vegetable protein (TVP). Peanut butter. Nuts and seeds. Dried beans. Protein powders.  Dairy  Whole milk. Whole-milk yogurt. Powdered milk. Cheese. Cottage Cheese. Eggnog.  Beverages  High-protein supplement drinks. Soy milk.  Other  Protein bars.  The items listed above may not be a complete list of recommended foods or beverages. Contact your dietitian for more options.  What are some high-calorie foods?  Grains  Pasta. Quick breads. Muffins. Pancakes. Ready-to-eat cereal.  Vegetables  Vegetables cooked in oil or butter. Fried potatoes.  Fruits  Dried fruit. Fruit leather.  Canned fruit in syrup. Fruit juice. Avocados.  Meats and Other Protein Sources  Peanut butter. Nuts and seeds.  Dairy  Heavy cream. Whipped cream. Cream cheese. Sour cream. Ice cream. Custard. Pudding.  Beverages  Meal-replacement beverages. Nutrition shakes. Fruit juice. Sugar-sweetened soft drinks.  Condiments  Salad dressing. Mayonnaise. Roger sauce. Fruit preserves or jelly. Honey. Syrup.  Sweets/Desserts  Cake. Cookies. Pie. Pastries. Candy bars. Chocolate.  Fats and Oils  Butter or margarine. Oil. Gravy.  Other  Meal-replacement bars.  The items listed above may not be a complete list of recommended foods or beverages. Contact your dietitian for more options.  What are some tips for including high-protein and high-calorie foods in my diet?  Add whole milk, half-and-half, or heavy cream to cereal, pudding, soup, or hot cocoa.  Add whole milk to instant breakfast drinks.  Add peanut butter to oatmeal or smoothies.  Add powdered milk to baked goods, smoothies, or milkshakes.  Add powdered milk, cream, or butter to mashed potatoes.  Add cheese to cooked vegetables.  Make whole-milk yogurt parfaits. Top them with granola, fruit, or nuts.  Add cottage cheese to your fruit.  Add avocados, cheese, or both to sandwiches or salads.  Add meat, poultry, or seafood to rice, pasta, casseroles, salads, and soups.  Use mayonnaise when making egg salad, chicken salad, or tuna salad.  Use peanut butter as a topping for pretzels, celery, or crackers.  Add beans to casseroles, dips, and spreads.  Add pureed beans to sauces and soups.  Replace calorie-free drinks with calorie-containing drinks, such as milk and fruit juice.  This information is not intended to replace advice given to you by your health care provider. Make sure you discuss any questions you have with your health care provider.  Document Released: 12/18/2006 Document Revised: 05/25/2017 Document Reviewed: 06/02/2015  [employer] Interactive Patient Education © 2018  [employer] Inc.  Smoking Tobacco Information, Adult  Smoking tobacco will very likely harm your health. Tobacco contains a poisonous (toxic), colorless chemical called nicotine. Nicotine affects the brain and makes tobacco addictive. This change in your brain can make it hard to stop smoking. Tobacco also has other toxic chemicals that can hurt your body and raise your risk of many cancers.  How can smoking tobacco affect me?  Smoking tobacco can increase your chances of having serious health conditions, such as:  · Cancer. Smoking is most commonly associated with lung cancer, but can lead to cancer in other parts of the body.  · Chronic obstructive pulmonary disease (COPD). This is a long-term lung condition that makes it hard to breathe. It also gets worse over time.  · High blood pressure (hypertension), heart disease, stroke, or heart attack.  · Lung infections, such as pneumonia.  · Cataracts. This is when the lenses in the eyes become clouded.  · Digestive problems. This may include peptic ulcers, heartburn, and gastroesophageal reflux disease (GERD).  · Oral health problems, such as gum disease and tooth loss.  · Loss of taste and smell.    Smoking can affect your appearance by causing:  · Wrinkles.  · Yellow or stained teeth, fingers, and fingernails.    Smoking tobacco can also affect your social life.  · Many workplaces, restaurants, hotels, and public places are tobacco-free. This means that you may experience challenges in finding places to smoke when away from home.  · The cost of a smoking habit can be expensive. Expenses for someone who smokes come in two ways:  ? You spend money on a regular basis to buy tobacco.  ? Your health care costs in the long-term are higher if you smoke.  · Tobacco smoke can also affect the health of those around you. Children of smokers have greater chances of:  ? Sudden infant death syndrome (SIDS).  ? Ear infections.  ? Lung infections.    What lifestyle changes can be  made?  · Do not start smoking. Quit if you already do.  · To quit smoking:  ? Make a plan to quit smoking and commit yourself to it. Look for programs to help you and ask your health care provider for recommendations and ideas.  ? Talk with your health care provider about using nicotine replacement medicines to help you quit. Medicine replacement medicines include gum, lozenges, patches, sprays, or pills.  ? Do not replace cigarette smoking with electronic cigarettes, which are commonly called e-cigarettes. The safety of e-cigarettes is not known, and some may contain harmful chemicals.  ? Avoid places, people, or situations that tempt you to smoke.  ? If you try to quit but return to smoking, don't give up hope. It is very common for people to try a number of times before they fully succeed. When you feel ready again, give it another try.  · Quitting smoking might affect the way you eat as well as your weight. Be prepared to monitor your eating habits. Get support in planning and following a healthy diet.  · Ask your health care provider about having regular tests (screenings) to check for cancer. This may include blood tests, imaging tests, and other tests.  · Exercise regularly. Consider taking walks, joining a gym, or doing yoga or exercise classes.  · Develop skills to manage your stress. These skills include meditation.  What are the benefits of quitting smoking?  By quitting smoking, you may:  · Lower your risk of getting cancer and other diseases caused by smoking.  · Live longer.  · Breathe better.  · Lower your blood pressure and heart rate.  · Stop your addiction to tobacco.  · Stop creating secondhand smoke that hurts other people.  · Improve your sense of taste and smell.  · Look better over time, due to having fewer wrinkles and less staining.    What can happen if changes are not made?  If you do not stop smoking, you may:  · Get cancer and other diseases.  · Develop COPD or other long-term (chronic)  lung conditions.  · Develop serious problems with your heart and blood vessels (cardiovascular system).  · Need more tests to screen for problems caused by smoking.  · Have higher, long-term healthcare costs from medicines or treatments related to smoking.  · Continue to have worsening changes in your lungs, mouth, and nose.    Where to find support  To get support to quit smoking, consider:  · Asking your health care provider for more information and resources.  · Taking classes to learn more about quitting smoking.  · Looking for local organizations that offer resources about quitting smoking.  · Joining a support group for people who want to quit smoking in your local community.    Where to find more information  You may find more information about quitting smoking from:  · HelpGuide.org: www.helpguide.org/articles/addictions/how-to-quit-smoking.htm  · Smokefree.gov: smokefree.gov  · American Lung Association: www.lung.org    Contact a health care provider if:  · You have problems breathing.  · Your lips, nose, or fingers turn blue.  · You have chest pain.  · You are coughing up blood.  · You feel faint or you pass out.  · You have other noticeable changes that cause you to worry.  Summary  · Smoking tobacco can negatively affect your health, the health of those around you, your finances, and your social life.  · Do not start smoking. Quit if you already do. If you need help quitting, ask your health care provider.  · Think about joining a support group for people who want to quit smoking in your local community. There are many effective programs that will help you to quit this behavior.  This information is not intended to replace advice given to you by your health care provider. Make sure you discuss any questions you have with your health care provider.  Document Released: 01/02/2018 Document Revised: 01/02/2018 Document Reviewed: 01/02/2018  Elsevier Interactive Patient Education © 2019 Elsevier Inc.  Chronic  Obstructive Pulmonary Disease  Chronic obstructive pulmonary disease (COPD) is a long-term (chronic) lung problem. When you have COPD, it is hard for air to get in and out of your lungs. Usually the condition gets worse over time, and your lungs will never return to normal. There are things you can do to keep yourself as healthy as possible.  · Your doctor may treat your condition with:  ? Medicines.  ? Oxygen.  ? Lung surgery.  · Your doctor may also recommend:  ? Rehabilitation. This includes steps to make your body work better. It may involve a team of specialists.  ? Quitting smoking, if you smoke.  ? Exercise and changes to your diet.  ? Comfort measures (palliative care).    Follow these instructions at home:  Medicines  · Take over-the-counter and prescription medicines only as told by your doctor.  · Talk to your doctor before taking any cough or allergy medicines. You may need to avoid medicines that cause your lungs to be dry.  Lifestyle  · If you smoke, stop. Smoking makes the problem worse. If you need help quitting, ask your doctor.  · Avoid being around things that make your breathing worse. This may include smoke, chemicals, and fumes.  · Stay active, but remember to rest as well.  · Learn and use tips on how to relax.  · Make sure you get enough sleep. Most adults need at least 7 hours of sleep every night.  · Eat healthy foods. Eat smaller meals more often. Rest before meals.  Controlled breathing  Learn and use tips on how to control your breathing as told by your doctor. Try:  · Breathing in (inhaling) through your nose for 1 second. Then, pucker your lips and breath out (exhale) through your lips for 2 seconds.  · Putting one hand on your belly (abdomen). Breathe in slowly through your nose for 1 second. Your hand on your belly should move out. Pucker your lips and breathe out slowly through your lips. Your hand on your belly should move in as you breathe out.    Controlled coughing  Learn and  use controlled coughing to clear mucus from your lungs. Follow these steps:  1. Lean your head a little forward.  2. Breathe in deeply.  3. Try to hold your breath for 3 seconds.  4. Keep your mouth slightly open while coughing 2 times.  5. Spit any mucus out into a tissue.  6. Rest and do the steps again 1 or 2 times as needed.    General instructions  · Make sure you get all the shots (vaccines) that your doctor recommends. Ask your doctor about a flu shot and a pneumonia shot.  · Use oxygen therapy and pulmonary rehabilitation if told by your doctor. If you need home oxygen therapy, ask your doctor if you should buy a tool to measure your oxygen level (oximeter).  · Make a COPD action plan with your doctor. This helps you to know what to do if you feel worse than usual.  · Manage any other conditions you have as told by your doctor.  · Avoid going outside when it is very hot, cold, or humid.  · Avoid people who have a sickness you can catch (contagious).  · Keep all follow-up visits as told by your doctor. This is important.  Contact a doctor if:  · You cough up more mucus than usual.  · There is a change in the color or thickness of the mucus.  · It is harder to breathe than usual.  · Your breathing is faster than usual.  · You have trouble sleeping.  · You need to use your medicines more often than usual.  · You have trouble doing your normal activities such as getting dressed or walking around the house.  Get help right away if:  · You have shortness of breath while resting.  · You have shortness of breath that stops you from:  ? Being able to talk.  ? Doing normal activities.  · Your chest hurts for longer than 5 minutes.  · Your skin color is more blue than usual.  · Your pulse oximeter shows that you have low oxygen for longer than 5 minutes.  · You have a fever.  · You feel too tired to breathe normally.  Summary  · Chronic obstructive pulmonary disease (COPD) is a long-term lung problem.  · The way your  lungs work will never return to normal. Usually the condition gets worse over time. There are things you can do to keep yourself as healthy as possible.  · Take over-the-counter and prescription medicines only as told by your doctor.  · If you smoke, stop. Smoking makes the problem worse.  This information is not intended to replace advice given to you by your health care provider. Make sure you discuss any questions you have with your health care provider.  Document Released: 06/05/2009 Document Revised: 01/22/2018 Document Reviewed: 01/22/2018  [employer] Interactive Patient Education © 2019 [employer]

## 2019-05-31 NOTE — PROGRESS NOTES
" PAOLO Shannon  [facility]   Respiratory Disease Clinic  [address] [place] [address]  Phone: [phone]  Fax: [phone]     Kate Carpenter is a 84 y.o. female.   : 1935  CC:   Chief Complaint   Patient presents with   • Shortness of Breath      HPI: Kate Carpenter is a pleasant 84 y.o. female. The patient is here today for follow up of COPD.   The patient has known COPD and currently utilizes Stiolto.  She is doing well with it and wishes to continue it.  Some shortness of breath with exertion, no fever, no chills, no cough with purulent sputum. The patient was hospitalized at [facility] from May 25, 2019 through May 28, 2019.  The patient states that she was treated for pneumonia and a urinary tract infection.  She states that she is feeling better.  The patient states, \"life's a bitch.\"  The patient is accompanied by her granddaughter.  The patient's granddaughter states that the patient is scheduled for surgery on Nicolasa 10 per Dr. 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  The patient's PCP is Toño Holland MD.    The following portions of the patient's history were reviewed and updated as appropriate: allergies, current medications, past family history, past medical history, past social history, past surgical history and problem list.  Past Medical History:   Diagnosis Date   • Colon polyp    • Coronary artery disease    • CVD (cardiovascular disease)    • GERD (gastroesophageal reflux disease)    • MS (multiple sclerosis) (CMS/[facility])    • Spastic colon      History reviewed. No pertinent family history.  Social History     Socioeconomic History   • Marital status: Unknown     Spouse name: Not on file   • Number of children: Not on file   • Years of education: Not on file   • Highest education level: Not on file   Tobacco Use   • Smoking status: Current Every Day Smoker     Packs/day: 3.00     Types: Cigarettes   • Smokeless tobacco: Never Used " "  Substance and Sexual Activity   • Alcohol use: No   • Drug use: No   • Sexual activity: No     Review of Systems   Constitutional: Negative for chills and fever.   HENT: Positive for congestion.    Eyes: Negative for blurred vision.   Respiratory: Positive for cough. Negative for shortness of breath.    Cardiovascular: Negative for chest pain.   Gastrointestinal: Negative for diarrhea, nausea and vomiting.   Endocrine: Negative for cold intolerance and heat intolerance.   Genitourinary: Negative for dysuria.   Musculoskeletal: Negative for arthralgias.   Skin: Negative for rash.   Neurological: Negative for dizziness, weakness and light-headedness.   Hematological: Does not bruise/bleed easily.   Psychiatric/Behavioral: Negative for agitation. The patient is not nervous/anxious.      /80   Pulse 56   Ht 149.9 cm (59\")   Wt 41.7 kg (92 lb)   SpO2 94% Comment: RA  Breastfeeding? No   BMI 18.58 kg/m²   Physical Exam   Constitutional: She is oriented to person, place, and time. She appears cachectic. No distress.   HENT:   Head: Normocephalic and atraumatic.   Eyes: Conjunctivae and EOM are normal. Pupils are equal, round, and reactive to light. No scleral icterus.   Neck: Normal range of motion. Neck supple.   Cardiovascular: Normal rate, regular rhythm and normal heart sounds. Exam reveals no friction rub.   No murmur heard.  Pulmonary/Chest: Effort normal and breath sounds normal. No respiratory distress. She has no wheezes. She has no rales.   Abdominal: Soft. Bowel sounds are normal. She exhibits no distension. There is no tenderness.   Musculoskeletal: Normal range of motion. She exhibits edema.   Neurological: She is alert and oriented to person, place, and time.   Skin: Skin is warm and dry.   Psychiatric: She has a normal mood and affect. Her behavior is normal. Judgment and thought content normal.   Nursing note and vitals reviewed.    Pulmonary Functions Testing Results:  FEV1   Date Value Ref " Range Status   05/31/2019 72% liters Final     FVC   Date Value Ref Range Status   05/31/2019 82% liters Final     FEV1/FVC   Date Value Ref Range Status   05/31/2019 66.53 % Final     DLCO   Date Value Ref Range Status   05/31/2019 35% ml/mmHg sec Final     My PFT Interpretation: Spirometry is consistent with moderate obstructive disease and moderate small airway disease.  Diffusion shows a severe diffusion impairment and remains severe when corrected for alveolar volume.  Imaging:   [facility] chest x-ray May 26, 2019  1. Ill-defined soft tissue density in the right upper lobe laterally  may represent an area of scarring or developing neoplasm. Follow-up  chest CT recommended.  2. Small bilateral pleural effusions. Passive atelectasis in both lung  bases.  3. Stable bronchial wall thickening.  4. Other findings, as discussed above.     [facility] 2D echo May 26, 2019  Summary   Moderate concentric left ventricular hypertrophy.   Normal left ventricular size with preserved LV function and an estimated   ejection fraction of approximately 55-60%.   Psuedonormalized filling pattern consistent with Grade II diastolic   filling pattern.    Assessment and Plan:   Kate was seen today for shortness of breath.    Diagnoses and all orders for this visit:    Dyspnea, unspecified type  -     Overnight Sleep Oximetry Study; Future  The patient describes shortness of breath mainly with exertion.  I offered to obtain rest and exercise sats today and the patient declined.  She was agreeable to an overnight pulse ox evaluate for nocturnal hypoxemia, therefore this was ordered.  Stage 2 moderate COPD by GOLD classification (CMS/[facility])  -     Pulmonary Function Test  -     tiotropium bromide-olodaterol (STIOLTO RESPIMAT) 2.5-2.5 MCG/ACT aerosol solution inhaler; Inhale 2 puffs Daily for 30 days.  New Stiolto.  The patient is doing well with this and wishes to continue it.  She was provided samples of Stiolto today from the office.  Allergic  rhinitis, unspecified seasonality, unspecified trigger  Continue Allegra.  Underweight  Diet education provided.  Lesion of right lung  We discussed the patient's chest x-ray and the noted ll-defined soft tissue density in the right upper lobe.  I offered follow-up with CT of the chest.  The patient declines at this time.    Health maintenance:   Influenza vaccine: YES  Pneumovax 23: NO  Prevnar 13: YES  Patient's Body mass index is 18.58 kg/m². BMI is below normal parameters. Recommendations include: DIET EDUCATION .    Follow up: 6 months  Rocio Klein, APRN  5/31/2019  1:55 PM    Please note that portions of this note were completed with a voice recognition program.

## 2019-06-07 ENCOUNTER — PREP FOR PROCEDURE (OUTPATIENT)
Dept: VASCULAR SURGERY | Age: 84
End: 2019-06-07

## 2019-06-07 RX ORDER — SODIUM CHLORIDE 0.9 % (FLUSH) 0.9 %
10 SYRINGE (ML) INJECTION PRN
Status: CANCELLED | OUTPATIENT
Start: 2019-06-07

## 2019-06-07 RX ORDER — ASPIRIN 81 MG/1
81 TABLET ORAL ONCE
Status: CANCELLED | OUTPATIENT
Start: 2019-06-07 | End: 2019-06-07

## 2019-06-07 RX ORDER — SODIUM CHLORIDE 9 MG/ML
INJECTION, SOLUTION INTRAVENOUS CONTINUOUS
Status: CANCELLED | OUTPATIENT
Start: 2019-06-07

## 2019-06-07 NOTE — H&P
Gilda Gtz PA-C   Physician Assistant   Physician Assistant Medical   Progress Notes      Signed   Encounter Date:  5/14/2019               Signed        Expand All Collapse All            Patient Care Team:  Elayne Sánchez MD as PCP - General (Family Medicine)  PARVEZ Adams MD (Cardiology)  Chauncey Daley MD as Neurologist (Neurology)  Goldie Bumpers, PA as Neurologist (Physician Assistant Medical)        History and Physical (addendum)     Mrs. Warrick Lanes is a 81 yo female who has a has prior history of peripheral vascular, carotid artery stenosis and a AAA. Current medication include Plavix and a statin daily. She was last ssen in our office on 4/27/16. She comes in today for follow up of wound and to be evaluated by Piper Hernandez. She reports that she has been seeing Dr. Katharine Jasso for Podiatry care. She has a callus scabbed area on the left great toe that has been present approximately 4 weeks. She reports at times she has pain in hr left foot that wakes her up and she usually has to get up and walk for a few minutes to get the pain to go away. She was admitted to the hospital on 5/25/19 for Pneumonia.  She was treated and discharged on 5/28/19 on [address] for 1 week with a f/u with her PCP and Pulmonologist. She is to a repeat chest x-ray in 4 to 6 weeks to reevaluate that area in the right upper lobe noted on CXR on X-ray from 5/26/19    Joesph Gallegos is a 80 y.o. 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 04/23/2019    H/O cardiac murmur 02/22/2019    Medication management 02/22/2019    Status post insertion of drug-eluting stent into left anterior descending artery 11/29/2018       (2) KELY to LAD  12/22/15       Altered mental state 06/23/2018    Tremor 06/23/2018    Fatigue      Coronary artery disease involving native coronary artery of native heart without angina pectoris 09/13/2017    Vascular disease 09/22/2016    2 KELY to LAD 09/22/2016    Stenosis of right carotid artery      Acute ST elevation myocardial infarction (STEMI) involving left anterior descending (LAD) coronary artery (HCC)      Chronic respiratory failure ([facility] 06/04/2015    Right carotid bruit 10/13/2014       Followed by Vascular       Smoker 10/13/2014    Carotid artery stenosis 03/25/2013    AAA (abdominal aortic aneurysm) without rupture (HCC) 03/25/2013    Multiple sclerosis (HCC)      Seizure disorder (HCC)      Cerebrovascular disease      Hyperlipidemia      Hypertension      Hypothyroidism      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)        Current Facility-Administered Medications          Current Outpatient Medications   Medication Sig Dispense Refill    nystatin (MYCOSTATIN) 781028 UNIT/GM powder Apply topically 4 times daily.  60 g 0    potassium chloride (KLOR-CON M) 20 MEQ TBCR extended release tablet Take 1 tablet by mouth daily 30 tablet 5    ibuprofen (ADVIL;MOTRIN) 600 MG tablet Take 1 tablet by mouth every 8 hours as needed for Pain 90 tablet 3    donepezil (ARICEPT ODT) 10 MG disintegrating tablet Take 10 mg by mouth nightly        furosemide (LASIX) 40 MG tablet Take 40 mg by mouth daily        IRON PO Take by mouth        B Complex Vitamins (VITAMIN B COMPLEX PO) Take by mouth        losartan (COZAAR) 50 MG tablet TAKE 1 TABLET DAILY 90 tablet 1    amLODIPine (NORVASC) 10 MG tablet TAKE 1 TABLET DAILY 90 tablet 1    levalbuterol (XOPENEX HFA) 45 MCG/ACT inhaler Inhale 1-2 puffs into the lungs every 4 hours as needed for Wheezing        tiotropium (SPIRIVA) 18 MCG inhalation capsule Inhale 18 mcg into the lungs daily        metoprolol succinate (TOPROL XL) 100 MG extended release tablet TAKE 1 TABLET DAILY 90 tablet 3    clopidogrel (PLAVIX) 75 MG tablet Take 1 tablet by mouth daily Indications: Cadmium Poisoning 30 tablet 5    nitroGLYCERIN (NITROSTAT) 0.4 MG SL tablet Place 1 tablet under the tongue every 5 minutes as needed for Chest pain. 25 tablet 3    esomeprazole (NEXIUM) 40 MG capsule Take 40 mg by mouth every morning (before breakfast).        atorvastatin (LIPITOR) 10 MG tablet Take 10 mg by mouth daily.        Levothyroxine Sodium (SYNTHROID PO) Take 0.1 mcg by mouth.        pregabalin (LYRICA) 75 MG capsule Take 75 mg by mouth nightly.          No current facility-administered medications for this visit.          Allergies: Morphine  Past Medical History        Past Medical History:   Diagnosis Date    AAA (abdominal aortic aneurysm) without rupture ([address] 3/25/2013    Arteriosclerotic heart disease       Coronary artery disease with LAD x2 drug-eluting stents.  Arthritis      Atherosclerosis of native arteries of the extremities with intermittent claudication      Atherosclerosis of native arteries of the extremities with intermittent claudication      CAD (coronary artery disease) 2009    Carotid artery occlusion 2009    Cerebrovascular disease      Heart attack ([address] 12/11/2015    History of blood transfusion      History of cardiac murmur      Hyperlipidemia 2009     Cholesterol management per Jared Baker M.D.   [facility] Hypertension 2009    Hypothyroidism      Multiple sclerosis ([address]      Seizure disorder ([address]       Possible    Thyroid disease 2009    Tobacco abuse, continuous 2009         Past Surgical History         Past Surgical History:   Procedure Laterality Date    ABDOMINAL AORTIC ANEURYSM REPAIR   [facility]/12.14.09     5.2 CM AAA; 14MM INTERPOSITIONAL DACRON GRAFT    APPENDECTOMY        CARDIAC CATHETERIZATION   06/11/2002     EF60%     CARDIAC CATHETERIZATION   6/17/15  [address]     EF 70%    CARDIAC CATHETERIZATION   12/11/2015     EF 60%, x2 drug-eluting stents LAD.  CARDIAC VALVE SURGERY   [facility]/12.11.09     & R/O    CAROTID ENDARTERECTOMY Right 1/11/2016     CAROTID ENDARTERECTOMY WITH EEG  performed by Wilver Bernardo MD at [address] [place] [address] COLONOSCOPY        DIAGNOSTIC CARDIAC CATH LAB PROCEDURE   6/2/03     EF over 60% Normal LV function and hemodynamics, Mild nonocclusive CAD, w/o hemodynamically significant  lesions  identified     DIAGNOSTIC CARDIAC CATH LAB PROCEDURE   2/24/12     2 stents    ENDOSCOPY, COLON, DIAGNOSTIC        EYE SURGERY Bilateral       cataracts    FOOT SURGERY Right       exc. lesion/bx.  FRACTURE SURGERY Right       elbow    OVARIAN CYST SURGERY        THYROID SURGERY        VASCULAR SURGERY   05- [facility]     Percutaneous Transluminal Arterial Angioplasty of left superficial femoral artery using a 6*2 cutting balloon reducing a 90% stenosis to 0% stenosis    VASCULAR SURGERY   12/11/2015 [facility]     Procedure: aborted induction for carotis endarterectomy    VASCULAR SURGERY   1/11/2015 [facility]     Procedure: right CEA, eversion technique         Family History         Family History   Problem Relation Age of Onset    Heart Attack Mother      Heart Attack Father      Coronary Art Dis Father      Other Other           AAA/2009    Other Sister           AAA/2009         Social History            Tobacco Use    Smoking status: Current Every Day Smoker       Packs/day: 2.00       Years: 50.00       Pack years: 100.00    Smokeless tobacco: Never Used   Substance Use Topics    Alcohol use: No         Review of Systems     Constitutional - no significant activity change, appetite change, or unexpected weight change. No fever or chills. No diaphoresis or significant fatigue. HENT - no significant rhinorrhea or epistaxis. No tinnitus or significant hearing loss. Eyes - no sudden vision change or amaurosis. Respiratory - no significant shortness of breath, wheezing, or stridor.   No apnea, cough, or chest tightness associated with GI - Abdomen - soft, non tender, bowel sounds X 4 quadrants. No guarding or rebound tenderness. No distension or palpable mass. Genitourinary - deferred. Musculoskeletal - ROM appears normal.  No significant edema. Neurologic - alert and oriented X 3. Physiologic. Skin - warm, dry, and intact. No rash, erythema, or pallor. Psychiatric - mood, affect, and behavior appear normal.  Judgment and thought processes appear normal.     Risk factors for atherosclerosis of all vascular beds have been reviewed with the patient including:  Family history, tobacco abuse in all forms, elevated cholesterol, hyperlipidemia, and diabetes.     Lower extremity arterial study:    Impression        The patient has moderately diminished ankle-brachial indices bilateral    lower extremity(ies) which would be consistent with claudication level    symptoms.    Based on segmental pressures and doppler waveforms, the patient likely has    disease in the bilateral superficial femoral, arterial segments.    Note: low toe pressures may indicate foot level occlusive disease and    severe decrease in flow to the forefeet.          Right CESAR 0.60, Left CESAR 0.63. Individual films reviewed: Yes. These results were reviewed with the patient.        Assessment     1. Atherosclerosis of native artery of left lower extremity with ulceration of midfoot (HCC)             Plan        Recommend continue Plavix 75 mg daily (hold 5 days for procedure)  Strongly encourage she continue statin therapy  Good skin care/checks daily  Recommend no smoking   Recommend she continue Podiatry care with Dr. Gracie Pereira  Patient evaluate by Dr. Eleazar Cevallos. Options were discussed with the patient including continued medical management versus proceeding with angiography and potential intervention for PVD with IRP. Patient has opted to proceed with angiography.   Risks have been discussed with the patient including but not limited to MI, death, CVA, bleed, nerve injury, infection, contrast nephropathy, possible need for dialysis, and need for further surgery.       Proceed with aortogram with runoff possible angioplasty/atherectomy/stent                             Office Visit on 5/14/2019            Detailed Report           Note shared with patient   Progress Notes Info     Author Note Status Last Update User   Holli Gomezk [id number] Dr Bob Duque, PA-C Signed [address], PAEdwardC   Last Update Date/Time: 5/17/2019  1:39 PM   Chart Review Routing History     Routing history could not be found for this note. This is because the note has never been routed or because communication record creation was suppressed.

## 2019-06-07 NOTE — H&P (VIEW-ONLY)
06/23/2018    Fatigue      Coronary artery disease involving native coronary artery of native heart without angina pectoris 09/13/2017    Vascular disease 09/22/2016    2 KELY to LAD 09/22/2016    Stenosis of right carotid artery      Acute ST elevation myocardial infarction (STEMI) involving left anterior descending (LAD) coronary artery (HCC)      Chronic respiratory failure ([facility] 06/04/2015    Right carotid bruit 10/13/2014       Followed by Vascular       Smoker 10/13/2014    Carotid artery stenosis 03/25/2013    AAA (abdominal aortic aneurysm) without rupture (HCC) 03/25/2013    Multiple sclerosis (HCC)      Seizure disorder (HCC)      Cerebrovascular disease      Hyperlipidemia      Hypertension      Hypothyroidism      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)        Current Facility-Administered Medications          Current Outpatient Medications   Medication Sig Dispense Refill    nystatin (MYCOSTATIN) 022381 UNIT/GM powder Apply topically 4 times daily.  60 g 0    potassium chloride (KLOR-CON M) 20 MEQ TBCR extended release tablet Take 1 tablet by mouth daily 30 tablet 5    ibuprofen (ADVIL;MOTRIN) 600 MG tablet Take 1 tablet by mouth every 8 hours as needed for Pain 90 tablet 3    donepezil (ARICEPT ODT) 10 MG disintegrating tablet Take 10 mg by mouth nightly        furosemide (LASIX) 40 MG tablet Take 40 mg by mouth daily        IRON PO Take by mouth        B Complex Vitamins (VITAMIN B COMPLEX PO) Take by mouth        losartan (COZAAR) 50 MG tablet TAKE 1 TABLET DAILY 90 tablet 1    amLODIPine (NORVASC) 10 MG tablet TAKE 1 TABLET DAILY 90 tablet 1    levalbuterol (XOPENEX HFA) 45 MCG/ACT inhaler Inhale 1-2 puffs into the lungs every 4 hours as needed for Wheezing        tiotropium (SPIRIVA) 18 MCG inhalation capsule Inhale 18 mcg into the lungs daily        metoprolol succinate (TOPROL XL) 100 MG extended release tablet TAKE 1 TABLET DAILY 90 tablet 3    clopidogrel (PLAVIX) 75 MG tablet Take 1 tablet by mouth daily Indications: Cadmium Poisoning 30 tablet 5    nitroGLYCERIN (NITROSTAT) 0.4 MG SL tablet Place 1 tablet under the tongue every 5 minutes as needed for Chest pain. 25 tablet 3    esomeprazole (NEXIUM) 40 MG capsule Take 40 mg by mouth every morning (before breakfast).        atorvastatin (LIPITOR) 10 MG tablet Take 10 mg by mouth daily.        Levothyroxine Sodium (SYNTHROID PO) Take 0.1 mcg by mouth.        pregabalin (LYRICA) 75 MG capsule Take 75 mg by mouth nightly.          No current facility-administered medications for this visit.          Allergies: Morphine  Past Medical History        Past Medical History:   Diagnosis Date    AAA (abdominal aortic aneurysm) without rupture ([address] 3/25/2013    Arteriosclerotic heart disease       Coronary artery disease with LAD x2 drug-eluting stents.  Arthritis      Atherosclerosis of native arteries of the extremities with intermittent claudication      Atherosclerosis of native arteries of the extremities with intermittent claudication      CAD (coronary artery disease) 2009    Carotid artery occlusion 2009    Cerebrovascular disease      Heart attack ([address] 12/11/2015    History of blood transfusion      History of cardiac murmur      Hyperlipidemia 2009     Cholesterol management per Keeley Martinez M.D.   [facility] Hypertension 2009    Hypothyroidism      Multiple sclerosis ([address]      Seizure disorder ([address]       Possible    Thyroid disease 2009    Tobacco abuse, continuous 2009         Past Surgical History         Past Surgical History:   Procedure Laterality Date    ABDOMINAL AORTIC ANEURYSM REPAIR   [facility]/12.14.09     5.2 CM AAA; 14MM INTERPOSITIONAL DACRON GRAFT    APPENDECTOMY        CARDIAC CATHETERIZATION   06/11/2002     EF60%     CARDIAC CATHETERIZATION   6/17/15  [address]     EF 70%    CARDIAC CATHETERIZATION   12/11/2015     EF 60%, x2 drug-eluting stents LAD.  CARDIAC VALVE SURGERY   [facility]/12.11.09     & R/O    CAROTID ENDARTERECTOMY Right 1/11/2016     CAROTID ENDARTERECTOMY WITH EEG  performed by Darlene Farisa MD at [address] [place] [address] COLONOSCOPY        DIAGNOSTIC CARDIAC CATH LAB PROCEDURE   6/2/03     EF over 60% Normal LV function and hemodynamics, Mild nonocclusive CAD, w/o hemodynamically significant  lesions  identified     DIAGNOSTIC CARDIAC CATH LAB PROCEDURE   2/24/12     2 stents    ENDOSCOPY, COLON, DIAGNOSTIC        EYE SURGERY Bilateral       cataracts    FOOT SURGERY Right       exc. lesion/bx.  FRACTURE SURGERY Right       elbow    OVARIAN CYST SURGERY        THYROID SURGERY        VASCULAR SURGERY   05- [facility]     Percutaneous Transluminal Arterial Angioplasty of left superficial femoral artery using a 6*2 cutting balloon reducing a 90% stenosis to 0% stenosis    VASCULAR SURGERY   12/11/2015 [facility]     Procedure: aborted induction for carotis endarterectomy    VASCULAR SURGERY   1/11/2015 [facility]     Procedure: right CEA, eversion technique         Family History         Family History   Problem Relation Age of Onset    Heart Attack Mother      Heart Attack Father      Coronary Art Dis Father      Other Other           AAA/2009    Other Sister           AAA/2009         Social History            Tobacco Use    Smoking status: Current Every Day Smoker       Packs/day: 2.00       Years: 50.00       Pack years: 100.00    Smokeless tobacco: Never Used   Substance Use Topics    Alcohol use: No         Review of Systems     Constitutional - no significant activity change, appetite change, or unexpected weight change. No fever or chills. No diaphoresis or significant fatigue. HENT - no significant rhinorrhea or epistaxis. No tinnitus or significant hearing loss. Eyes - no sudden vision change or amaurosis. Respiratory - no significant shortness of breath, wheezing, or stridor.   No apnea, cough, or chest tightness associated with shortness of breath. Cardiovascular - no chest pain, syncope, or significant dizziness. No palpitations or significant leg swelling. Patient reports no claudication. Gastrointestinal - no abdominal swelling or pain. No blood in stool. No severe constipation, diarrhea, nausea, or vomiting. Genitourinary - No difficulty urinating, dysuria, frequency, or urgency. No flank pain or hematuria. Musculoskeletal - no back pain, gait disturbance, or myalgia. Skin - no color change, rash, pallor. Large Corn left great toe (dorsal aspect) She has a hammer toe  Neurologic - no dizziness, facial asymmetry, or light headedness. No seizures. No speech difficulty or lateralizing weakness. Hematologic - no easy bruising or excessive bleeding. Psychiatric - no severe anxiety or nervousness. No confusion. All other review of systems are negative.        Physical Exam     BP (!) 95/53 Comment: right  Pulse 69   Temp 97.8 °F (36.6 °C)   Resp 18   SpO2 95%      Constitutional - well developed, well nourished. No diaphoresis or acute distress. HENT - head normocephalic. Right external ear canal appears normal.  Left external ear canal appears normal.  Septum appears midline. Eyes - conjunctiva normal.  EOMS normal.  No exudate. No icterus. Neck- ROM appears normal, no tracheal deviation. Cardiovascular - Regular rate and rhythm. Heart sounds are normal.  No murmur, rub, or gallop. Carotid pulses are 2+ to palpation bilaterally without bruit. Extremities - Radial and brachial pulses are 2+ to palpation bilaterally. Femoral pulses 2+. DP and PT pulses are not palpable. She has a large corn on the left great toe with surrounding mild erythema. She has hammer toes. She has thick hyperkeratotic yellow discolored toenails. No cyanosis, clubbing, or significant edema. No signs atheroembolic event. Pulmonary - effort appears normal.  No respiratory distress. Lungs - Breath sounds normal. No wheezes or rales. infection, contrast nephropathy, possible need for dialysis, and need for further surgery.       Proceed with aortogram with runoff possible angioplasty/atherectomy/stent                             Office Visit on 5/14/2019            Detailed Report           Note shared with patient   Progress Notes Info     Author Note Status Last Update User   Arely Sas [address] Dr Bob Duque PA-C Signed Golden Martinez PA-C   Last Update Date/Time: 5/17/2019  1:39 PM   Chart Review Routing History     Routing history could not be found for this note. This is because the note has never been routed or because communication record creation was suppressed.

## 2019-06-10 ENCOUNTER — HOSPITAL ENCOUNTER (OUTPATIENT)
Dept: INTERVENTIONAL RADIOLOGY/VASCULAR | Age: 84
Discharge: HOME | End: 2019-06-10
Payer: MEDICARE

## 2019-06-10 VITALS
HEIGHT: 60 IN | WEIGHT: 91 LBS | RESPIRATION RATE: 23 BRPM | TEMPERATURE: 98.3 F | DIASTOLIC BLOOD PRESSURE: 52 MMHG | OXYGEN SATURATION: 97 % | SYSTOLIC BLOOD PRESSURE: 122 MMHG | BODY MASS INDEX: 17.87 KG/M2 | HEART RATE: 95 BPM

## 2019-06-10 DIAGNOSIS — I73.9 PVD (PERIPHERAL VASCULAR DISEASE) (HCC): ICD-10-CM

## 2019-06-10 PROCEDURE — 6360000002 HC RX W HCPCS: Performed by: PHYSICIAN ASSISTANT

## 2019-06-10 PROCEDURE — 2500000003 HC RX 250 WO HCPCS: Performed by: SURGERY

## 2019-06-10 PROCEDURE — 6370000000 HC RX 637 (ALT 250 FOR IP): Performed by: PHYSICIAN ASSISTANT

## 2019-06-10 PROCEDURE — 6360000002 HC RX W HCPCS: Performed by: SURGERY

## 2019-06-10 PROCEDURE — 2580000003 HC RX 258: Performed by: PHYSICIAN ASSISTANT

## 2019-06-10 PROCEDURE — C1769 GUIDE WIRE: HCPCS

## 2019-06-10 PROCEDURE — G0269 OCCLUSIVE DEVICE IN VEIN ART: HCPCS | Performed by: SURGERY

## 2019-06-10 PROCEDURE — 75625 CONTRAST EXAM ABDOMINL AORTA: CPT | Performed by: SURGERY

## 2019-06-10 PROCEDURE — 36200 PLACE CATHETER IN AORTA: CPT | Performed by: SURGERY

## 2019-06-10 PROCEDURE — 75716 ARTERY X-RAYS ARMS/LEGS: CPT | Performed by: SURGERY

## 2019-06-10 PROCEDURE — 6360000004 HC RX CONTRAST MEDICATION: Performed by: SURGERY

## 2019-06-10 RX ORDER — IODIXANOL 320 MG/ML
INJECTION, SOLUTION INTRAVASCULAR
Status: COMPLETED | OUTPATIENT
Start: 2019-06-10 | End: 2019-06-10

## 2019-06-10 RX ORDER — SODIUM CHLORIDE 0.9 % (FLUSH) 0.9 %
10 SYRINGE (ML) INJECTION PRN
Status: DISCONTINUED | OUTPATIENT
Start: 2019-06-10 | End: 2019-06-12 | Stop reason: HOSPADM

## 2019-06-10 RX ORDER — ACETAMINOPHEN 325 MG/1
650 TABLET ORAL EVERY 4 HOURS PRN
Status: DISCONTINUED | OUTPATIENT
Start: 2019-06-10 | End: 2019-06-12 | Stop reason: HOSPADM

## 2019-06-10 RX ORDER — LIDOCAINE HYDROCHLORIDE 20 MG/ML
INJECTION, SOLUTION INFILTRATION; PERINEURAL
Status: COMPLETED | OUTPATIENT
Start: 2019-06-10 | End: 2019-06-10

## 2019-06-10 RX ORDER — SODIUM CHLORIDE 9 MG/ML
INJECTION, SOLUTION INTRAVENOUS CONTINUOUS
Status: DISCONTINUED | OUTPATIENT
Start: 2019-06-10 | End: 2019-06-12 | Stop reason: HOSPADM

## 2019-06-10 RX ORDER — ONDANSETRON 2 MG/ML
4 INJECTION INTRAMUSCULAR; INTRAVENOUS EVERY 8 HOURS PRN
Status: DISCONTINUED | OUTPATIENT
Start: 2019-06-10 | End: 2019-06-12 | Stop reason: HOSPADM

## 2019-06-10 RX ORDER — HYDROCODONE BITARTRATE AND ACETAMINOPHEN 5; 325 MG/1; MG/1
1 TABLET ORAL EVERY 4 HOURS PRN
Status: DISCONTINUED | OUTPATIENT
Start: 2019-06-10 | End: 2019-06-12 | Stop reason: HOSPADM

## 2019-06-10 RX ORDER — FENTANYL CITRATE 50 UG/ML
INJECTION, SOLUTION INTRAMUSCULAR; INTRAVENOUS
Status: COMPLETED | OUTPATIENT
Start: 2019-06-10 | End: 2019-06-10

## 2019-06-10 RX ORDER — MIDAZOLAM HYDROCHLORIDE 1 MG/ML
INJECTION INTRAMUSCULAR; INTRAVENOUS
Status: COMPLETED | OUTPATIENT
Start: 2019-06-10 | End: 2019-06-10

## 2019-06-10 RX ORDER — SODIUM CHLORIDE 9 MG/ML
INJECTION, SOLUTION INTRAVENOUS CONTINUOUS
Status: DISCONTINUED | OUTPATIENT
Start: 2019-06-10 | End: 2019-06-10

## 2019-06-10 RX ORDER — HYDROCODONE BITARTRATE AND ACETAMINOPHEN 5; 325 MG/1; MG/1
2 TABLET ORAL EVERY 4 HOURS PRN
Status: DISCONTINUED | OUTPATIENT
Start: 2019-06-10 | End: 2019-06-12 | Stop reason: HOSPADM

## 2019-06-10 RX ORDER — HEPARIN SODIUM 5000 [USP'U]/ML
INJECTION, SOLUTION INTRAVENOUS; SUBCUTANEOUS
Status: COMPLETED | OUTPATIENT
Start: 2019-06-10 | End: 2019-06-10

## 2019-06-10 RX ORDER — ASPIRIN 81 MG/1
81 TABLET ORAL ONCE
Status: COMPLETED | OUTPATIENT
Start: 2019-06-10 | End: 2019-06-10

## 2019-06-10 RX ADMIN — MIDAZOLAM 0.5 MG: 1 INJECTION INTRAMUSCULAR; INTRAVENOUS at 11:05

## 2019-06-10 RX ADMIN — IODIXANOL 90 ML: 320 INJECTION, SOLUTION INTRAVASCULAR at 11:23

## 2019-06-10 RX ADMIN — FENTANYL CITRATE 25 MCG: 50 INJECTION INTRAMUSCULAR; INTRAVENOUS at 11:06

## 2019-06-10 RX ADMIN — HEPARIN SODIUM 5000 UNITS: 5000 INJECTION, SOLUTION INTRAVENOUS; SUBCUTANEOUS at 11:11

## 2019-06-10 RX ADMIN — ASPIRIN 81 MG: 81 TABLET ORAL at 10:26

## 2019-06-10 RX ADMIN — Medication 2 G: at 10:56

## 2019-06-10 RX ADMIN — LIDOCAINE HYDROCHLORIDE 10 ML: 20 INJECTION, SOLUTION INFILTRATION; PERINEURAL at 11:11

## 2019-06-10 RX ADMIN — SODIUM CHLORIDE: 9 INJECTION, SOLUTION INTRAVENOUS at 10:26

## 2019-06-10 NOTE — BRIEF OP NOTE
SPECIALS BRIEF OP NOTE    Diagnostic Procedures:  Aortogram and runoff    Interventions: none    Access Site(s): RCFA    Sheath(s): 5F, sized up to 6 for perclose    Anesthesia: 2% Lidocaine    Conscious Sedation: 0.5mg Versed  25mcg Fentanyl        Antibiotics: 2G Cefazolin    Contrast: 90mL of Visipaque    Closure Device: Perclose    Comments: Bilateral SFA occlusions    Disposition: Cath holding    Devora Lao MD  06/10/19  11:38 AM

## 2019-06-10 NOTE — INTERVAL H&P NOTE
H&P Update    Patient's History and Physical from June 7,  was reviewed. Patient examined. There has been no change.     Sheila Glass

## 2019-06-11 DIAGNOSIS — R06.00 DYSPNEA, UNSPECIFIED TYPE: ICD-10-CM

## 2019-06-13 DIAGNOSIS — J44.9 STAGE 2 MODERATE COPD BY GOLD CLASSIFICATION (HCC): Primary | ICD-10-CM

## 2019-06-28 ENCOUNTER — HOSPITAL ENCOUNTER (EMERGENCY)
Age: 84
Discharge: HOME | End: 2019-06-29
Attending: EMERGENCY MEDICINE
Payer: MEDICARE

## 2019-06-28 ENCOUNTER — APPOINTMENT (OUTPATIENT)
Dept: CT IMAGING | Age: 84
End: 2019-06-28
Payer: MEDICARE

## 2019-06-28 DIAGNOSIS — R19.7 DIARRHEA, UNSPECIFIED TYPE: Primary | ICD-10-CM

## 2019-06-28 DIAGNOSIS — F03.90 DEMENTIA WITHOUT BEHAVIORAL DISTURBANCE, UNSPECIFIED DEMENTIA TYPE: ICD-10-CM

## 2019-06-28 LAB
ALBUMIN SERPL-MCNC: 3.3 G/DL (ref 3.5–5.2)
ALP BLD-CCNC: 97 U/L (ref 35–104)
ALT SERPL-CCNC: 8 U/L (ref 5–33)
ANION GAP SERPL CALCULATED.3IONS-SCNC: 13 MMOL/L (ref 7–19)
AST SERPL-CCNC: 20 U/L (ref 5–32)
BACTERIA: ABNORMAL /HPF
BASOPHILS ABSOLUTE: 0.1 K/UL (ref 0–0.2)
BASOPHILS RELATIVE PERCENT: 0.6 % (ref 0–1)
BILIRUB SERPL-MCNC: <0.2 MG/DL (ref 0.2–1.2)
BILIRUBIN URINE: NEGATIVE
BLOOD, URINE: ABNORMAL
BUN BLDV-MCNC: 13 MG/DL (ref 8–23)
CALCIUM SERPL-MCNC: 8.4 MG/DL (ref 8.8–10.2)
CHLORIDE BLD-SCNC: 102 MMOL/L (ref 98–111)
CLARITY: ABNORMAL
CO2: 21 MMOL/L (ref 22–29)
COLOR: YELLOW
CREAT SERPL-MCNC: 1.2 MG/DL (ref 0.5–0.9)
EOSINOPHILS ABSOLUTE: 0.2 K/UL (ref 0–0.6)
EOSINOPHILS RELATIVE PERCENT: 1.6 % (ref 0–5)
EPITHELIAL CELLS, UA: ABNORMAL /HPF
GFR NON-AFRICAN AMERICAN: 43
GLUCOSE BLD-MCNC: 143 MG/DL (ref 74–109)
GLUCOSE URINE: NEGATIVE MG/DL
HCT VFR BLD CALC: 32.3 % (ref 37–47)
HEMOGLOBIN: 9.7 G/DL (ref 12–16)
KETONES, URINE: NEGATIVE MG/DL
LEUKOCYTE ESTERASE, URINE: ABNORMAL
LIPASE: 34 U/L (ref 13–60)
LYMPHOCYTES ABSOLUTE: 1.1 K/UL (ref 1.1–4.5)
LYMPHOCYTES RELATIVE PERCENT: 10.3 % (ref 20–40)
MCH RBC QN AUTO: 29.1 PG (ref 27–31)
MCHC RBC AUTO-ENTMCNC: 30 G/DL (ref 33–37)
MCV RBC AUTO: 97 FL (ref 81–99)
MONOCYTES ABSOLUTE: 1 K/UL (ref 0–0.9)
MONOCYTES RELATIVE PERCENT: 9.4 % (ref 0–10)
NEUTROPHILS ABSOLUTE: 8.3 K/UL (ref 1.5–7.5)
NEUTROPHILS RELATIVE PERCENT: 76.8 % (ref 50–65)
NITRITE, URINE: NEGATIVE
PDW BLD-RTO: 14.8 % (ref 11.5–14.5)
PH UA: 6.5 (ref 5–8)
PLATELET # BLD: 366 K/UL (ref 130–400)
PMV BLD AUTO: 10 FL (ref 9.4–12.3)
POTASSIUM SERPL-SCNC: 4.6 MMOL/L (ref 3.5–5)
PROTEIN UA: ABNORMAL MG/DL
RBC # BLD: 3.33 M/UL (ref 4.2–5.4)
RBC UA: ABNORMAL /HPF (ref 0–2)
RENAL EPITHELIAL, UA: ABNORMAL /HPF
SODIUM BLD-SCNC: 136 MMOL/L (ref 136–145)
SPECIFIC GRAVITY UA: 1.04 (ref 1–1.03)
TOTAL PROTEIN: 6.6 G/DL (ref 6.6–8.7)
URINE REFLEX TO CULTURE: YES
UROBILINOGEN, URINE: 0.2 E.U./DL
WBC # BLD: 10.8 K/UL (ref 4.8–10.8)
WBC UA: ABNORMAL /HPF (ref 0–5)
YEAST: ABNORMAL /HPF

## 2019-06-28 PROCEDURE — 85025 COMPLETE CBC W/AUTO DIFF WBC: CPT

## 2019-06-28 PROCEDURE — 74177 CT ABD & PELVIS W/CONTRAST: CPT

## 2019-06-28 PROCEDURE — 36415 COLL VENOUS BLD VENIPUNCTURE: CPT

## 2019-06-28 PROCEDURE — 93005 ELECTROCARDIOGRAM TRACING: CPT

## 2019-06-28 PROCEDURE — 83690 ASSAY OF LIPASE: CPT

## 2019-06-28 PROCEDURE — 70450 CT HEAD/BRAIN W/O DYE: CPT

## 2019-06-28 PROCEDURE — 80053 COMPREHEN METABOLIC PANEL: CPT

## 2019-06-28 PROCEDURE — 6360000004 HC RX CONTRAST MEDICATION: Performed by: EMERGENCY MEDICINE

## 2019-06-28 PROCEDURE — 99285 EMERGENCY DEPT VISIT HI MDM: CPT

## 2019-06-28 RX ADMIN — IOPAMIDOL 50 ML: 755 INJECTION, SOLUTION INTRAVENOUS at 21:11

## 2019-06-29 VITALS
DIASTOLIC BLOOD PRESSURE: 45 MMHG | BODY MASS INDEX: 16.22 KG/M2 | TEMPERATURE: 98 F | SYSTOLIC BLOOD PRESSURE: 117 MMHG | RESPIRATION RATE: 20 BRPM | WEIGHT: 95 LBS | HEART RATE: 69 BPM | HEIGHT: 64 IN | OXYGEN SATURATION: 98 %

## 2019-06-29 PROCEDURE — 87086 URINE CULTURE/COLONY COUNT: CPT

## 2019-06-29 PROCEDURE — 87186 SC STD MICRODIL/AGAR DIL: CPT

## 2019-06-29 PROCEDURE — 87077 CULTURE AEROBIC IDENTIFY: CPT

## 2019-06-29 PROCEDURE — 81001 URINALYSIS AUTO W/SCOPE: CPT

## 2019-06-29 PROCEDURE — 99285 EMERGENCY DEPT VISIT HI MDM: CPT | Performed by: EMERGENCY MEDICINE

## 2019-06-29 ASSESSMENT — ENCOUNTER SYMPTOMS
SHORTNESS OF BREATH: 0
DIARRHEA: 1
VOMITING: 0
ABDOMINAL DISTENTION: 0
ABDOMINAL PAIN: 1

## 2019-06-29 NOTE — ED NOTES
Incontinent of stool. , buttocks and legs cleansed for new and dried stool     Filippo Ortega RN  06/28/19 1910

## 2019-06-29 NOTE — ED PROVIDER NOTES
90% stenosis to 0% stenosis    VASCULAR SURGERY  12/11/2015 [facility]    Procedure: aborted induction for carotis endarterectomy    VASCULAR SURGERY  1/11/2015 [facility]    Procedure: right CEA, eversion technique    VASCULAR SURGERY  06/15/2019    [facility]. Aortogram and runoff. Right common femoral artery 5 french sheath size up to 6 french sheath for perclose closure. CURRENT MEDICATIONS     Previous Medications    AMLODIPINE (NORVASC) 5 MG TABLET    Take 1 tablet by mouth daily    ASPIRIN 81 MG EC TABLET    Take 1 tablet by mouth daily    ATORVASTATIN (LIPITOR) 10 MG TABLET    Take 10 mg by mouth daily. B COMPLEX VITAMINS (VITAMIN B COMPLEX PO)    Take by mouth    CLOPIDOGREL (PLAVIX) 75 MG TABLET    Take 1 tablet by mouth daily Indications: Cadmium Poisoning    DONEPEZIL (ARICEPT ODT) 10 MG DISINTEGRATING TABLET    Take 10 mg by mouth nightly    ESOMEPRAZOLE (NEXIUM) 40 MG CAPSULE    Take 40 mg by mouth every morning (before breakfast). FUROSEMIDE (LASIX) 40 MG TABLET    Take 40 mg by mouth daily    IRON PO    Take by mouth    LEVALBUTEROL (XOPENEX HFA) 45 MCG/ACT INHALER    Inhale 1-2 puffs into the lungs every 4 hours as needed for Wheezing    LEVOTHYROXINE SODIUM (SYNTHROID PO)    Take 0.1 mcg by mouth. LOSARTAN (COZAAR) 50 MG TABLET    TAKE 1 TABLET DAILY    NITROGLYCERIN (NITROSTAT) 0.4 MG SL TABLET    Place 1 tablet under the tongue every 5 minutes as needed for Chest pain. NYSTATIN (MYCOSTATIN) 898835 UNIT/GM POWDER    Apply topically 4 times daily. POTASSIUM CHLORIDE (KLOR-CON M) 20 MEQ TBCR EXTENDED RELEASE TABLET    Take 1 tablet by mouth daily    PREGABALIN (LYRICA) 75 MG CAPSULE    Take 75 mg by mouth nightly.        ALLERGIES     Morphine    FAMILY HISTORY       Family History   Problem Relation Age of Onset    Heart Attack Mother     Heart Attack Father     Coronary Art Dis Father     Other Other         AAA/2009    Other Sister         AAA/2009          SOCIAL HISTORY       Social Cardiovascular: Normal rate, regular rhythm, normal heart sounds and intact distal pulses. No murmur heard. Pulmonary/Chest: Effort normal and breath sounds normal. No stridor. No respiratory distress. Abdominal: Soft. She exhibits no distension. There is tenderness (mild, LLQ). There is no guarding. Musculoskeletal: She exhibits no edema or deformity. Neurological: She is alert. She has normal strength. Skin: Capillary refill takes less than 2 seconds. No rash noted. No pallor. Nursing note and vitals reviewed. DIAGNOSTIC RESULTS       RADIOLOGY:  Non-plain film images such as CT, Ultrasound and MRI are read by the radiologist. Plain radiographic images are visualized and preliminarily interpreted bythe emergency physician with the below findings:      CT ABDOMEN PELVIS W IV CONTRAST Additional Contrast? None   Final Result   1. No acute process in the abdomen or pelvis. 2. Sigmoid diverticulosis without acute diverticulitis. 3. Prominent spinal scoliotic curvature with diffuse osteopenia. No   acute fracture. Signed by Dr Alton Trujillo on 6/28/2019 9:48 PM      CT Head WO Contrast   Final Result   1. No acute intracranial process. Stable exam.   2. Advanced chronic small vessel ischemic change with additional old   cortical infarcts.    Signed by Dr Alton Trujillo on 6/28/2019 9:39 PM              LABS:  Labs Reviewed   CBC WITH AUTO DIFFERENTIAL - Abnormal; Notable for the following components:       Result Value    RBC 3.33 (*)     Hemoglobin 9.7 (*)     Hematocrit 32.3 (*)     MCHC 30.0 (*)     RDW 14.8 (*)     Neutrophils % 76.8 (*)     Lymphocytes % 10.3 (*)     Neutrophils # 8.3 (*)     Monocytes # 1.00 (*)     All other components within normal limits   COMPREHENSIVE METABOLIC PANEL - Abnormal; Notable for the following components:    CO2 21 (*)     Glucose 143 (*)     CREATININE 1.2 (*)     GFR Non-African American 43 (*)     Calcium 8.4 (*)     Alb 3.3 (*)     All other components

## 2019-06-30 LAB
EKG P AXIS: 65 DEGREES
EKG P-R INTERVAL: 198 MS
EKG Q-T INTERVAL: 514 MS
EKG QRS DURATION: 124 MS
EKG QTC CALCULATION (BAZETT): 499 MS
EKG T AXIS: 59 DEGREES

## 2019-07-02 LAB
ORGANISM: ABNORMAL
ORGANISM: ABNORMAL
URINE CULTURE, ROUTINE: ABNORMAL

## 2019-08-05 ENCOUNTER — OFFICE VISIT (OUTPATIENT)
Dept: PODIATRY | Facility: CLINIC | Age: 84
End: 2019-08-05

## 2019-08-05 VITALS
BODY MASS INDEX: 18.55 KG/M2 | HEART RATE: 43 BPM | SYSTOLIC BLOOD PRESSURE: 98 MMHG | HEIGHT: 59 IN | OXYGEN SATURATION: 93 % | WEIGHT: 92 LBS | DIASTOLIC BLOOD PRESSURE: 50 MMHG

## 2019-08-05 DIAGNOSIS — L60.2 ONYCHOGRYPHOSIS: ICD-10-CM

## 2019-08-05 DIAGNOSIS — Z72.0 TOBACCO ABUSE: ICD-10-CM

## 2019-08-05 DIAGNOSIS — M79.671 FOOT PAIN, BILATERAL: ICD-10-CM

## 2019-08-05 DIAGNOSIS — Z79.02 ANTIPLATELET OR ANTITHROMBOTIC LONG-TERM USE: ICD-10-CM

## 2019-08-05 DIAGNOSIS — M79.672 FOOT PAIN, BILATERAL: ICD-10-CM

## 2019-08-05 DIAGNOSIS — B35.1 ONYCHOMYCOSIS: Primary | ICD-10-CM

## 2019-08-05 DIAGNOSIS — I73.9 PAD (PERIPHERAL ARTERY DISEASE) (HCC): ICD-10-CM

## 2019-08-05 PROCEDURE — 11721 DEBRIDE NAIL 6 OR MORE: CPT | Performed by: PODIATRIST

## 2019-08-05 NOTE — PROGRESS NOTES
[facility] - PODIATRY    Today's Date: 08/05/19    Patient Name: Kate Carpenter  MRN: [id number]  [facility]: [id number]  PCP: Toño Holland MD  Referring Provider: No ref. provider found    SUBJECTIVE     Chief Complaint   Patient presents with   • Follow-up     pt c/o elongated, thickened toenails-  bilateral foot lesion , denies pain at present time - Dr. Holland last visit 2/20/19- non-diabetic      HPI: Kate Carpenter, a 84 y.o.female, comes to clinic as a(n) established patient complaining of painful toenails. Patient has h/o CAD, GERD, MS, Tobacco use. Smokes 1-2 ppd. Patient is non-diabetic. Denies numbness in feet. Denies open wounds or sores. States that her toenails are very long, thick, and crumbly. She or her family is unable to cut them. Takes Plavix. Son relates that she saw Dr. Donovan and had an updated JC. There was discussion of the next step but she missed her follow-up. Denies pain today but admits to tenderness to nails when wearing shoes. Relates previous treatment(s) including foot care by podiatrist. Denies any constitutional symptoms. No other pedal complaints at this time.    Past Medical History:   Diagnosis Date   • Colon polyp    • Coronary artery disease    • CVD (cardiovascular disease)    • GERD (gastroesophageal reflux disease)    • MS (multiple sclerosis) (CMS/HCC)    • Spastic colon      Past Surgical History:   Procedure Laterality Date   • COLONOSCOPY  10/16/2012   • THYROIDECTOMY       History reviewed. No pertinent family history.  Social History     Socioeconomic History   • Marital status: Unknown     Spouse name: Not on file   • Number of children: Not on file   • Years of education: Not on file   • Highest education level: Not on file   Tobacco Use   • Smoking status: Current Every Day Smoker     Packs/day: 3.00     Types: Cigarettes   • Smokeless tobacco: Never Used   Substance and Sexual Activity   • Alcohol use: No   • Drug use: No   • Sexual activity: No      Allergies   Allergen Reactions   • Morphine Confusion     If patient has too much it causes hallucinations. If minor amount patient can take this.      Current Outpatient Medications   Medication Sig Dispense Refill   • amLODIPine (NORVASC) 10 MG tablet Take 10 mg by mouth Daily.     • aspirin 81 MG EC tablet Take 81 mg by mouth.     • atorvastatin (LIPITOR) 10 MG tablet Take 10 mg by mouth Daily.     • clopidogrel (PLAVIX) 75 MG tablet Take 75 mg by mouth Daily.     • donepezil (ARICEPT) 5 MG tablet Take 5 mg by mouth Every Night.     • fexofenadine (ALLEGRA) 180 MG tablet      • furosemide (LASIX) 40 MG tablet Take 40 mg by mouth.     • ibuprofen (ADVIL,MOTRIN) 600 MG tablet      • IRON PO Take  by mouth.     • levothyroxine (SYNTHROID, LEVOTHROID) 100 MCG tablet Take 100 mcg by mouth Daily.     • losartan (COZAAR) 50 MG tablet Take 50 mg by mouth Daily.     • LYRICA 75 MG capsule Take 75 mg by mouth 2 (Two) Times a Day.     • metoprolol succinate XL (TOPROL-XL) 100 MG 24 hr tablet Take 100 mg by mouth Daily.     • potassium chloride (MICRO-K) 10 MEQ CR capsule      • STIOLTO RESPIMAT 2.5-2.5 MCG/ACT aerosol solution inhaler      • vitamin D (ERGOCALCIFEROL) 17271 units capsule capsule        No current facility-administered medications for this visit.      Review of Systems   Constitutional: Negative for chills and fever.   HENT: Negative for congestion.    Respiratory: Negative for shortness of breath.    Cardiovascular: Positive for leg swelling. Negative for chest pain.   Gastrointestinal: Negative for constipation, diarrhea, nausea and vomiting.   Musculoskeletal:        Foot pain   Skin: Negative for wound.   Neurological: Negative for numbness.       OBJECTIVE     Vitals:    08/05/19 1118   BP: 98/50   Pulse: (!) 43   SpO2: 93%       PHYSICAL EXAM  GEN:   Accompanied by son.     General Exam  Appearance: appears stated age and healthy   Orientation: alert and oriented to person, place, and time    Affect: appropriate   Gait: unimpaired   Assistance: wheelchair use   Shoe Gear: casual shoes    Foot/Ankle Exam  Inspection and Palpation  Ecchymosis - Right: none Left: none  Tenderness - Right: (Toenails) Left: (Toenails)  Swelling - Right: none   Left: none    Arch - Right: normal Left: normal  Hammertoes - Right: second toe, fifth toe, third toe and fourth toe Left: second toe, fifth toe, fourth toe and third toe  Claw Toes - Right: absent Left: absent  Mallet Toes - Right: absent Left: absent  Hallux Valgus - Right: no Left: no  Hallux Limitus - Right: no Left: no  Skin - Right: skin intact  Left: skin intact   Nails -  Right: abnormally thick and onychomycosis Left: abnormally thick and onychomycosis     Vascular  Dorsalis Pedis - Right: 1+ Left: 1+  Posterior Tibial - Right: 1+ Left: 1+  Skin Temperature -  Right: warm  Left: warm    CFT - Right: 5 Left: 5  Pedal Hair Growth - Right: present Left: present  Varicosities -  Right: mild varicosities  Left: mild varicosities      Neurologic  Saphenous Nerve Sensation  - Right: normal Left: normal  Tibial Nerve Sensation - Right: normal Left: normal  Superficial Peroneal Nerve Sensation - Right: normal Left: normal  Deep Peroneal Nerve Sensation - Right: normal Left: normal  Sural Nerve Sensation - Right: normal Left: normal  Protective Sensation using [place]ice Monofilament - Right: 10 Left: 10    Muscle Strength  Dorsiflexion - Right: 4+ Left: 4+  Plantar Flexion - Right: 4+ Left: 4+  Eversion - Right: 4+ Left: 4+  Inversion - Right: 4+ Left: 4+  Great Toe Extension - Right: 4+ Left: 4+  Great Toe Flexion - Right: 4+ Left: 4+    Range of Motion  Normal right ankle ROM  Normal left ankle ROM            RADIOLOGY/NUCLEAR:  No results found.   Pressures  +--------------------------------------++--------+-----+----+--------+-----+  !                                      !!Right   !     !Left!        !      !  +--------------------------------------++--------+-----+----+--------+-----+  !Location                              !!Pressure!Ratio!    !Pressure!Ratio!  +--------------------------------------++--------+-----+----+--------+-----+  !Upper Thigh                           !!132     !0.92 !    !130     !0.9  !  +--------------------------------------++--------+-----+----+--------+-----+  !Lower Thigh                           !!86      !0.6  !    !76      !0.53 !  +--------------------------------------++--------+-----+----+--------+-----+  !Calf                                  !!255     !1.77 !    !255     !1.77 !  +--------------------------------------++--------+-----+----+--------+-----+  !Ankle PT                              !!87      !0.6  !    !91      !0.63 !  +--------------------------------------++--------+-----+----+--------+-----+  !DP                                    !!77      !0.53 !    !79      !0.55 !  +--------------------------------------++--------+-----+----+--------+-----+  !Great Toe                             !!0       !0    !    !0       !0    !  +--------------------------------------++--------+-----+----+--------+-----+      - Brachial Pressure:Right: 134.Left:144.      - JC:Right: 0.6.Left: 0.63.    Plethysmographic Digit Evaluation  +---------++--------+-----+---------------++--------+-----+----------------+  !         !!Right   !     !Left           !!        !     !                !  +---------++--------+-----+---------------++--------+-----+----------------+  !Location !!Pressure!Ratio!PPG Wave Form  !!Pressure!Ratio!PPG Wave Form   !  +---------++--------+-----+---------------++--------+-----+----------------+  !Great Toe!!0       !0    !               !!0       !0    !                !  +---------++--------+-----+---------------++--------+-----+----------------+    LABORATORY/CULTURE RESULTS:      PATHOLOGY RESULTS:       ASSESSMENT/PLAN     Kate was seen today for  follow-up.    Diagnoses and all orders for this visit:    Onychomycosis    Foot pain, bilateral    Antiplatelet or antithrombotic long-term use    Tobacco abuse    PAD (peripheral artery disease) (CMS/[facility])    Onychogryphosis      Comprehensive lower extremity examination and evaluation was performed.  Discussed findings and treatment plan including risks, benefits, and treatment options with patient in detail. Patient agreed with treatment plan.  After verbal consent obtained, nail(s) x10 debrided of length and thickness with nail nipper without incidence  Patient may maintain nails and calluses at home utilizing emery board or pumice stone between visits as needed   Discussed ABIs and need for follow-up with Dr. Donovan  Discussed tobacco cessation  An After Visit Summary was printed and given to the patient at discharge, including (if requested) any available informative/educational handouts regarding diagnosis, treatment, or medications. All questions were answered to patient/family satisfaction. Should symptoms fail to improve or worsen they agree to call or return to clinic or to go to the Emergency Department. Discussed the importance of following up with any needed screening tests/labs/specialist appointments and any requested follow-up recommended by me today. Importance of maintaining follow-up discussed and patient accepts that missed appointments can delay diagnosis and potentially lead to worsening of conditions.  Return in about 3 months (around 11/5/2019)., or sooner if acute issues arise.        This document has been electronically signed by Misha Mccormick DPM on August 5, 2019 11:51 AM

## 2019-08-14 ENCOUNTER — HOSPITAL ENCOUNTER (OUTPATIENT)
Dept: GENERAL RADIOLOGY | Age: 84
Discharge: HOME | DRG: 480 | End: 2019-08-14
Payer: MEDICARE

## 2019-08-14 DIAGNOSIS — M25.521 ARTHRALGIA OF ELBOW, RIGHT: ICD-10-CM

## 2019-08-14 PROCEDURE — 73070 X-RAY EXAM OF ELBOW: CPT

## 2019-08-17 ENCOUNTER — APPOINTMENT (OUTPATIENT)
Dept: CT IMAGING | Age: 84
DRG: 480 | End: 2019-08-17
Payer: MEDICARE

## 2019-08-17 ENCOUNTER — ANESTHESIA EVENT (OUTPATIENT)
Dept: OPERATING ROOM | Age: 84
DRG: 480 | End: 2019-08-17
Payer: MEDICARE

## 2019-08-17 ENCOUNTER — APPOINTMENT (OUTPATIENT)
Dept: GENERAL RADIOLOGY | Age: 84
DRG: 480 | End: 2019-08-17
Payer: MEDICARE

## 2019-08-17 ENCOUNTER — OUTSIDE FACILITY SERVICE (OUTPATIENT)
Dept: PULMONOLOGY | Facility: CLINIC | Age: 84
End: 2019-08-17

## 2019-08-17 ENCOUNTER — HOSPITAL ENCOUNTER (INPATIENT)
Age: 84
LOS: 9 days | Discharge: NURSING, REHAB OR LONG-TERM CARE | DRG: 480 | End: 2019-08-26
Attending: EMERGENCY MEDICINE | Admitting: FAMILY MEDICINE
Payer: MEDICARE

## 2019-08-17 ENCOUNTER — ANESTHESIA (OUTPATIENT)
Dept: OPERATING ROOM | Age: 84
DRG: 480 | End: 2019-08-17
Payer: MEDICARE

## 2019-08-17 VITALS
OXYGEN SATURATION: 90 % | DIASTOLIC BLOOD PRESSURE: 57 MMHG | SYSTOLIC BLOOD PRESSURE: 130 MMHG | TEMPERATURE: 96.4 F | RESPIRATION RATE: 4 BRPM

## 2019-08-17 DIAGNOSIS — S72.101A CLOSED FRACTURE OF TROCHANTER OF RIGHT FEMUR, INITIAL ENCOUNTER (HCC): Primary | ICD-10-CM

## 2019-08-17 DIAGNOSIS — G35 MULTIPLE SCLEROSIS (HCC): ICD-10-CM

## 2019-08-17 DIAGNOSIS — R91.8 LUNG INFILTRATE: ICD-10-CM

## 2019-08-17 PROBLEM — S72.141A INTERTROCHANTERIC FRACTURE OF RIGHT FEMUR, CLOSED, INITIAL ENCOUNTER (HCC): Status: ACTIVE | Noted: 2019-08-17

## 2019-08-17 LAB
ABO/RH: NORMAL
ALBUMIN SERPL-MCNC: 3.3 G/DL (ref 3.5–5.2)
ALP BLD-CCNC: 100 U/L (ref 35–104)
ALT SERPL-CCNC: <5 U/L (ref 5–33)
ANION GAP SERPL CALCULATED.3IONS-SCNC: 13 MMOL/L (ref 7–19)
ANTIBODY SCREEN: NORMAL
APTT: 31.2 SEC (ref 26–36.2)
AST SERPL-CCNC: 12 U/L (ref 5–32)
BASOPHILS ABSOLUTE: 0 K/UL (ref 0–0.2)
BASOPHILS RELATIVE PERCENT: 0.3 % (ref 0–1)
BILIRUB SERPL-MCNC: 0.3 MG/DL (ref 0.2–1.2)
BUN BLDV-MCNC: 10 MG/DL (ref 8–23)
CALCIUM SERPL-MCNC: 8.8 MG/DL (ref 8.8–10.2)
CHLORIDE BLD-SCNC: 101 MMOL/L (ref 98–111)
CO2: 25 MMOL/L (ref 22–29)
CREAT SERPL-MCNC: 0.6 MG/DL (ref 0.5–0.9)
EOSINOPHILS ABSOLUTE: 0.1 K/UL (ref 0–0.6)
EOSINOPHILS RELATIVE PERCENT: 0.4 % (ref 0–5)
GFR NON-AFRICAN AMERICAN: >60
GLUCOSE BLD-MCNC: 129 MG/DL (ref 74–109)
HCT VFR BLD CALC: 33.4 % (ref 37–47)
HEMOGLOBIN: 10.2 G/DL (ref 12–16)
IMMATURE GRANULOCYTES #: 0.2 K/UL
INR BLD: 1.27 (ref 0.88–1.18)
LYMPHOCYTES ABSOLUTE: 1 K/UL (ref 1.1–4.5)
LYMPHOCYTES RELATIVE PERCENT: 8.1 % (ref 20–40)
MCH RBC QN AUTO: 27.9 PG (ref 27–31)
MCHC RBC AUTO-ENTMCNC: 30.5 G/DL (ref 33–37)
MCV RBC AUTO: 91.3 FL (ref 81–99)
MONOCYTES ABSOLUTE: 0.9 K/UL (ref 0–0.9)
MONOCYTES RELATIVE PERCENT: 7.6 % (ref 0–10)
NEUTROPHILS ABSOLUTE: 9.8 K/UL (ref 1.5–7.5)
NEUTROPHILS RELATIVE PERCENT: 82.2 % (ref 50–65)
PDW BLD-RTO: 15.3 % (ref 11.5–14.5)
PLATELET # BLD: 333 K/UL (ref 130–400)
PMV BLD AUTO: 10.5 FL (ref 9.4–12.3)
POTASSIUM SERPL-SCNC: 3.5 MMOL/L (ref 3.5–5)
PROTHROMBIN TIME: 15.3 SEC (ref 12–14.6)
RBC # BLD: 3.66 M/UL (ref 4.2–5.4)
SODIUM BLD-SCNC: 139 MMOL/L (ref 136–145)
TOTAL PROTEIN: 7.1 G/DL (ref 6.6–8.7)
WBC # BLD: 11.9 K/UL (ref 4.8–10.8)

## 2019-08-17 PROCEDURE — 99223 1ST HOSP IP/OBS HIGH 75: CPT | Performed by: INTERNAL MEDICINE

## 2019-08-17 PROCEDURE — 0QS636Z REPOSITION RIGHT UPPER FEMUR WITH INTRAMEDULLARY INTERNAL FIXATION DEVICE, PERCUTANEOUS APPROACH: ICD-10-PCS | Performed by: ORTHOPAEDIC SURGERY

## 2019-08-17 PROCEDURE — 3700000001 HC ADD 15 MINUTES (ANESTHESIA): Performed by: ORTHOPAEDIC SURGERY

## 2019-08-17 PROCEDURE — 85610 PROTHROMBIN TIME: CPT

## 2019-08-17 PROCEDURE — 86850 RBC ANTIBODY SCREEN: CPT

## 2019-08-17 PROCEDURE — 71045 X-RAY EXAM CHEST 1 VIEW: CPT

## 2019-08-17 PROCEDURE — 2580000003 HC RX 258: Performed by: NURSE ANESTHETIST, CERTIFIED REGISTERED

## 2019-08-17 PROCEDURE — 85025 COMPLETE CBC W/AUTO DIFF WBC: CPT

## 2019-08-17 PROCEDURE — 2580000003 HC RX 258: Performed by: ORTHOPAEDIC SURGERY

## 2019-08-17 PROCEDURE — 6370000000 HC RX 637 (ALT 250 FOR IP): Performed by: ORTHOPAEDIC SURGERY

## 2019-08-17 PROCEDURE — 6360000002 HC RX W HCPCS: Performed by: NURSE ANESTHETIST, CERTIFIED REGISTERED

## 2019-08-17 PROCEDURE — 2500000003 HC RX 250 WO HCPCS: Performed by: NURSE ANESTHETIST, CERTIFIED REGISTERED

## 2019-08-17 PROCEDURE — 80053 COMPREHEN METABOLIC PANEL: CPT

## 2019-08-17 PROCEDURE — 2780000010 HC IMPLANT OTHER: Performed by: ORTHOPAEDIC SURGERY

## 2019-08-17 PROCEDURE — 2709999900 HC NON-CHARGEABLE SUPPLY: Performed by: ORTHOPAEDIC SURGERY

## 2019-08-17 PROCEDURE — 36415 COLL VENOUS BLD VENIPUNCTURE: CPT

## 2019-08-17 PROCEDURE — C1769 GUIDE WIRE: HCPCS | Performed by: ORTHOPAEDIC SURGERY

## 2019-08-17 PROCEDURE — 96374 THER/PROPH/DIAG INJ IV PUSH: CPT

## 2019-08-17 PROCEDURE — 6360000002 HC RX W HCPCS: Performed by: ORTHOPAEDIC SURGERY

## 2019-08-17 PROCEDURE — 3600000004 HC SURGERY LEVEL 4 BASE: Performed by: ORTHOPAEDIC SURGERY

## 2019-08-17 PROCEDURE — 6370000000 HC RX 637 (ALT 250 FOR IP): Performed by: FAMILY MEDICINE

## 2019-08-17 PROCEDURE — 6360000002 HC RX W HCPCS: Performed by: EMERGENCY MEDICINE

## 2019-08-17 PROCEDURE — 2720000010 HC SURG SUPPLY STERILE: Performed by: ORTHOPAEDIC SURGERY

## 2019-08-17 PROCEDURE — 3209999900 FLUORO FOR SURGICAL PROCEDURES

## 2019-08-17 PROCEDURE — 3700000000 HC ANESTHESIA ATTENDED CARE: Performed by: ORTHOPAEDIC SURGERY

## 2019-08-17 PROCEDURE — 99285 EMERGENCY DEPT VISIT HI MDM: CPT

## 2019-08-17 PROCEDURE — 94640 AIRWAY INHALATION TREATMENT: CPT

## 2019-08-17 PROCEDURE — 73502 X-RAY EXAM HIP UNI 2-3 VIEWS: CPT

## 2019-08-17 PROCEDURE — 7100000001 HC PACU RECOVERY - ADDTL 15 MIN: Performed by: ORTHOPAEDIC SURGERY

## 2019-08-17 PROCEDURE — 7100000000 HC PACU RECOVERY - FIRST 15 MIN: Performed by: ORTHOPAEDIC SURGERY

## 2019-08-17 PROCEDURE — 1210000000 HC MED SURG R&B

## 2019-08-17 PROCEDURE — 2700000000 HC OXYGEN THERAPY PER DAY

## 2019-08-17 PROCEDURE — 2500000003 HC RX 250 WO HCPCS: Performed by: ORTHOPAEDIC SURGERY

## 2019-08-17 PROCEDURE — 2580000003 HC RX 258: Performed by: FAMILY MEDICINE

## 2019-08-17 PROCEDURE — C1713 ANCHOR/SCREW BN/BN,TIS/BN: HCPCS | Performed by: ORTHOPAEDIC SURGERY

## 2019-08-17 PROCEDURE — 93005 ELECTROCARDIOGRAM TRACING: CPT

## 2019-08-17 PROCEDURE — 86901 BLOOD TYPING SEROLOGIC RH(D): CPT

## 2019-08-17 PROCEDURE — 3600000014 HC SURGERY LEVEL 4 ADDTL 15MIN: Performed by: ORTHOPAEDIC SURGERY

## 2019-08-17 PROCEDURE — 72192 CT PELVIS W/O DYE: CPT

## 2019-08-17 PROCEDURE — 6370000000 HC RX 637 (ALT 250 FOR IP): Performed by: NURSE ANESTHETIST, CERTIFIED REGISTERED

## 2019-08-17 PROCEDURE — 6360000002 HC RX W HCPCS: Performed by: FAMILY MEDICINE

## 2019-08-17 PROCEDURE — 86900 BLOOD TYPING SEROLOGIC ABO: CPT

## 2019-08-17 PROCEDURE — 85730 THROMBOPLASTIN TIME PARTIAL: CPT

## 2019-08-17 DEVICE — IMPLANTABLE DEVICE: Type: IMPLANTABLE DEVICE | Site: FEMUR | Status: FUNCTIONAL

## 2019-08-17 DEVICE — SCREW BNE L34MM DIA5MM TIB LT GRN TI ST CANN LOK FULL THRD: Type: IMPLANTABLE DEVICE | Site: FEMUR | Status: FUNCTIONAL

## 2019-08-17 RX ORDER — ALBUTEROL SULFATE 90 UG/1
AEROSOL, METERED RESPIRATORY (INHALATION) PRN
Status: DISCONTINUED | OUTPATIENT
Start: 2019-08-17 | End: 2019-08-17 | Stop reason: SDUPTHER

## 2019-08-17 RX ORDER — ONDANSETRON 2 MG/ML
INJECTION INTRAMUSCULAR; INTRAVENOUS PRN
Status: DISCONTINUED | OUTPATIENT
Start: 2019-08-17 | End: 2019-08-17 | Stop reason: SDUPTHER

## 2019-08-17 RX ORDER — CEFAZOLIN SODIUM 1 G/50ML
1 INJECTION, SOLUTION INTRAVENOUS EVERY 8 HOURS
Status: COMPLETED | OUTPATIENT
Start: 2019-08-17 | End: 2019-08-18

## 2019-08-17 RX ORDER — SODIUM CHLORIDE 9 MG/ML
INJECTION, SOLUTION INTRAVENOUS CONTINUOUS
Status: DISCONTINUED | OUTPATIENT
Start: 2019-08-17 | End: 2019-08-23

## 2019-08-17 RX ORDER — POLYETHYLENE GLYCOL 3350 17 G/17G
17 POWDER, FOR SOLUTION ORAL DAILY PRN
Status: DISCONTINUED | OUTPATIENT
Start: 2019-08-17 | End: 2019-08-26 | Stop reason: HOSPADM

## 2019-08-17 RX ORDER — FENTANYL CITRATE 50 UG/ML
25 INJECTION, SOLUTION INTRAMUSCULAR; INTRAVENOUS ONCE
Status: COMPLETED | OUTPATIENT
Start: 2019-08-17 | End: 2019-08-17

## 2019-08-17 RX ORDER — SODIUM CHLORIDE, SODIUM LACTATE, POTASSIUM CHLORIDE, CALCIUM CHLORIDE 600; 310; 30; 20 MG/100ML; MG/100ML; MG/100ML; MG/100ML
INJECTION, SOLUTION INTRAVENOUS CONTINUOUS PRN
Status: DISCONTINUED | OUTPATIENT
Start: 2019-08-17 | End: 2019-08-17 | Stop reason: SDUPTHER

## 2019-08-17 RX ORDER — SODIUM CHLORIDE 0.9 % (FLUSH) 0.9 %
10 SYRINGE (ML) INJECTION EVERY 12 HOURS SCHEDULED
Status: DISCONTINUED | OUTPATIENT
Start: 2019-08-17 | End: 2019-08-17 | Stop reason: SDUPTHER

## 2019-08-17 RX ORDER — METOPROLOL TARTRATE 50 MG/1
50 TABLET, FILM COATED ORAL 2 TIMES DAILY
Status: ON HOLD | COMMUNITY
End: 2019-08-26 | Stop reason: HOSPADM

## 2019-08-17 RX ORDER — SODIUM CHLORIDE 0.9 % (FLUSH) 0.9 %
10 SYRINGE (ML) INJECTION PRN
Status: DISCONTINUED | OUTPATIENT
Start: 2019-08-17 | End: 2019-08-26 | Stop reason: HOSPADM

## 2019-08-17 RX ORDER — BISACODYL 10 MG
10 SUPPOSITORY, RECTAL RECTAL DAILY PRN
Status: DISCONTINUED | OUTPATIENT
Start: 2019-08-17 | End: 2019-08-26 | Stop reason: HOSPADM

## 2019-08-17 RX ORDER — MEPERIDINE HYDROCHLORIDE 50 MG/ML
12.5 INJECTION INTRAMUSCULAR; INTRAVENOUS; SUBCUTANEOUS EVERY 5 MIN PRN
Status: DISCONTINUED | OUTPATIENT
Start: 2019-08-17 | End: 2019-08-17 | Stop reason: HOSPADM

## 2019-08-17 RX ORDER — PREGABALIN 75 MG/1
75 CAPSULE ORAL NIGHTLY
Status: DISCONTINUED | OUTPATIENT
Start: 2019-08-17 | End: 2019-08-26 | Stop reason: HOSPADM

## 2019-08-17 RX ORDER — ACETAMINOPHEN 325 MG/1
650 TABLET ORAL EVERY 4 HOURS PRN
Status: DISCONTINUED | OUTPATIENT
Start: 2019-08-17 | End: 2019-08-26 | Stop reason: HOSPADM

## 2019-08-17 RX ORDER — LIDOCAINE HYDROCHLORIDE 10 MG/ML
INJECTION, SOLUTION INFILTRATION; PERINEURAL PRN
Status: DISCONTINUED | OUTPATIENT
Start: 2019-08-17 | End: 2019-08-17 | Stop reason: SDUPTHER

## 2019-08-17 RX ORDER — VITAMIN C
1 TAB ORAL DAILY
Status: DISCONTINUED | OUTPATIENT
Start: 2019-08-17 | End: 2019-08-26 | Stop reason: HOSPADM

## 2019-08-17 RX ORDER — NITROGLYCERIN 0.4 MG/1
0.4 TABLET SUBLINGUAL EVERY 5 MIN PRN
Status: DISCONTINUED | OUTPATIENT
Start: 2019-08-17 | End: 2019-08-26 | Stop reason: HOSPADM

## 2019-08-17 RX ORDER — ALBUTEROL SULFATE 2.5 MG/3ML
2.5 SOLUTION RESPIRATORY (INHALATION) EVERY 6 HOURS PRN
Status: DISCONTINUED | OUTPATIENT
Start: 2019-08-17 | End: 2019-08-26 | Stop reason: HOSPADM

## 2019-08-17 RX ORDER — FUROSEMIDE 40 MG/1
40 TABLET ORAL DAILY
Status: DISCONTINUED | OUTPATIENT
Start: 2019-08-17 | End: 2019-08-26 | Stop reason: HOSPADM

## 2019-08-17 RX ORDER — FENTANYL CITRATE 50 UG/ML
25 INJECTION, SOLUTION INTRAMUSCULAR; INTRAVENOUS
Status: DISCONTINUED | OUTPATIENT
Start: 2019-08-17 | End: 2019-08-26

## 2019-08-17 RX ORDER — LEVOTHYROXINE SODIUM 0.1 MG/1
100 TABLET ORAL DAILY
Status: DISCONTINUED | OUTPATIENT
Start: 2019-08-17 | End: 2019-08-26 | Stop reason: HOSPADM

## 2019-08-17 RX ORDER — ACETAMINOPHEN 325 MG/1
650 TABLET ORAL EVERY 4 HOURS PRN
Status: DISCONTINUED | OUTPATIENT
Start: 2019-08-17 | End: 2019-08-17 | Stop reason: SDUPTHER

## 2019-08-17 RX ORDER — CLOPIDOGREL BISULFATE 75 MG/1
75 TABLET ORAL DAILY
Status: DISCONTINUED | OUTPATIENT
Start: 2019-08-17 | End: 2019-08-22

## 2019-08-17 RX ORDER — LABETALOL 20 MG/4 ML (5 MG/ML) INTRAVENOUS SYRINGE
5 EVERY 10 MIN PRN
Status: DISCONTINUED | OUTPATIENT
Start: 2019-08-17 | End: 2019-08-17 | Stop reason: HOSPADM

## 2019-08-17 RX ORDER — DEXAMETHASONE SODIUM PHOSPHATE 4 MG/ML
INJECTION, SOLUTION INTRA-ARTICULAR; INTRALESIONAL; INTRAMUSCULAR; INTRAVENOUS; SOFT TISSUE PRN
Status: DISCONTINUED | OUTPATIENT
Start: 2019-08-17 | End: 2019-08-17 | Stop reason: SDUPTHER

## 2019-08-17 RX ORDER — CEFAZOLIN SODIUM 1 G/3ML
INJECTION, POWDER, FOR SOLUTION INTRAMUSCULAR; INTRAVENOUS PRN
Status: DISCONTINUED | OUTPATIENT
Start: 2019-08-17 | End: 2019-08-17 | Stop reason: SDUPTHER

## 2019-08-17 RX ORDER — OXYCODONE HYDROCHLORIDE 5 MG/1
10 TABLET ORAL EVERY 4 HOURS PRN
Status: DISCONTINUED | OUTPATIENT
Start: 2019-08-17 | End: 2019-08-26

## 2019-08-17 RX ORDER — DIPHENHYDRAMINE HYDROCHLORIDE 50 MG/ML
12.5 INJECTION INTRAMUSCULAR; INTRAVENOUS
Status: DISCONTINUED | OUTPATIENT
Start: 2019-08-17 | End: 2019-08-17 | Stop reason: HOSPADM

## 2019-08-17 RX ORDER — FENTANYL CITRATE 50 UG/ML
50 INJECTION, SOLUTION INTRAMUSCULAR; INTRAVENOUS
Status: DISCONTINUED | OUTPATIENT
Start: 2019-08-17 | End: 2019-08-26

## 2019-08-17 RX ORDER — SODIUM CHLORIDE 0.9 % (FLUSH) 0.9 %
10 SYRINGE (ML) INJECTION PRN
Status: DISCONTINUED | OUTPATIENT
Start: 2019-08-17 | End: 2019-08-17 | Stop reason: SDUPTHER

## 2019-08-17 RX ORDER — ATORVASTATIN CALCIUM 10 MG/1
10 TABLET, FILM COATED ORAL DAILY
Status: DISCONTINUED | OUTPATIENT
Start: 2019-08-17 | End: 2019-08-26 | Stop reason: HOSPADM

## 2019-08-17 RX ORDER — ASPIRIN 81 MG/1
81 TABLET ORAL DAILY
Status: DISCONTINUED | OUTPATIENT
Start: 2019-08-17 | End: 2019-08-17

## 2019-08-17 RX ORDER — GUAIFENESIN 600 MG/1
600 TABLET, EXTENDED RELEASE ORAL 2 TIMES DAILY
Status: DISCONTINUED | OUTPATIENT
Start: 2019-08-17 | End: 2019-08-26 | Stop reason: HOSPADM

## 2019-08-17 RX ORDER — PROMETHAZINE HYDROCHLORIDE 25 MG/ML
6.25 INJECTION, SOLUTION INTRAMUSCULAR; INTRAVENOUS
Status: DISCONTINUED | OUTPATIENT
Start: 2019-08-17 | End: 2019-08-17 | Stop reason: HOSPADM

## 2019-08-17 RX ORDER — OXYCODONE HYDROCHLORIDE 5 MG/1
5 TABLET ORAL EVERY 4 HOURS PRN
Status: DISCONTINUED | OUTPATIENT
Start: 2019-08-17 | End: 2019-08-26 | Stop reason: HOSPADM

## 2019-08-17 RX ORDER — ONDANSETRON 2 MG/ML
4 INJECTION INTRAMUSCULAR; INTRAVENOUS EVERY 8 HOURS PRN
Status: DISCONTINUED | OUTPATIENT
Start: 2019-08-17 | End: 2019-08-26 | Stop reason: HOSPADM

## 2019-08-17 RX ORDER — PROPOFOL 10 MG/ML
INJECTION, EMULSION INTRAVENOUS PRN
Status: DISCONTINUED | OUTPATIENT
Start: 2019-08-17 | End: 2019-08-17 | Stop reason: SDUPTHER

## 2019-08-17 RX ORDER — METOPROLOL SUCCINATE 50 MG/1
50 TABLET, EXTENDED RELEASE ORAL DAILY
Status: ON HOLD | COMMUNITY
End: 2019-08-26 | Stop reason: HOSPADM

## 2019-08-17 RX ORDER — HYDRALAZINE HYDROCHLORIDE 20 MG/ML
5 INJECTION INTRAMUSCULAR; INTRAVENOUS EVERY 10 MIN PRN
Status: DISCONTINUED | OUTPATIENT
Start: 2019-08-17 | End: 2019-08-17 | Stop reason: HOSPADM

## 2019-08-17 RX ORDER — SODIUM CHLORIDE 0.9 % (FLUSH) 0.9 %
10 SYRINGE (ML) INJECTION EVERY 12 HOURS SCHEDULED
Status: DISCONTINUED | OUTPATIENT
Start: 2019-08-17 | End: 2019-08-26 | Stop reason: HOSPADM

## 2019-08-17 RX ORDER — METOCLOPRAMIDE HYDROCHLORIDE 5 MG/ML
10 INJECTION INTRAMUSCULAR; INTRAVENOUS
Status: DISCONTINUED | OUTPATIENT
Start: 2019-08-17 | End: 2019-08-17 | Stop reason: HOSPADM

## 2019-08-17 RX ORDER — DONEPEZIL HYDROCHLORIDE 10 MG/1
10 TABLET, ORALLY DISINTEGRATING ORAL NIGHTLY
Status: DISCONTINUED | OUTPATIENT
Start: 2019-08-17 | End: 2019-08-26 | Stop reason: HOSPADM

## 2019-08-17 RX ORDER — ROCURONIUM BROMIDE 10 MG/ML
INJECTION, SOLUTION INTRAVENOUS PRN
Status: DISCONTINUED | OUTPATIENT
Start: 2019-08-17 | End: 2019-08-17 | Stop reason: SDUPTHER

## 2019-08-17 RX ORDER — FENTANYL CITRATE 50 UG/ML
INJECTION, SOLUTION INTRAMUSCULAR; INTRAVENOUS PRN
Status: DISCONTINUED | OUTPATIENT
Start: 2019-08-17 | End: 2019-08-17 | Stop reason: SDUPTHER

## 2019-08-17 RX ORDER — METOPROLOL TARTRATE 5 MG/5ML
2.5 INJECTION INTRAVENOUS EVERY 6 HOURS PRN
Status: DISCONTINUED | OUTPATIENT
Start: 2019-08-17 | End: 2019-08-26 | Stop reason: HOSPADM

## 2019-08-17 RX ORDER — POTASSIUM CHLORIDE 20 MEQ/1
20 TABLET, EXTENDED RELEASE ORAL DAILY
Status: DISCONTINUED | OUTPATIENT
Start: 2019-08-17 | End: 2019-08-24

## 2019-08-17 RX ORDER — PANTOPRAZOLE SODIUM 40 MG/1
40 TABLET, DELAYED RELEASE ORAL
Status: DISCONTINUED | OUTPATIENT
Start: 2019-08-17 | End: 2019-08-26 | Stop reason: HOSPADM

## 2019-08-17 RX ORDER — DOCUSATE SODIUM 100 MG/1
100 CAPSULE, LIQUID FILLED ORAL 2 TIMES DAILY
Status: DISCONTINUED | OUTPATIENT
Start: 2019-08-17 | End: 2019-08-26 | Stop reason: HOSPADM

## 2019-08-17 RX ADMIN — PHENYLEPHRINE HYDROCHLORIDE 80 MCG: 10 INJECTION INTRAVENOUS at 13:09

## 2019-08-17 RX ADMIN — Medication 10 ML: at 22:23

## 2019-08-17 RX ADMIN — PHENYLEPHRINE HYDROCHLORIDE 80 MCG: 10 INJECTION INTRAVENOUS at 13:30

## 2019-08-17 RX ADMIN — CEFAZOLIN 1000 MG: 330 INJECTION, POWDER, FOR SOLUTION INTRAMUSCULAR; INTRAVENOUS at 13:25

## 2019-08-17 RX ADMIN — PHENYLEPHRINE HYDROCHLORIDE 80 MCG: 10 INJECTION INTRAVENOUS at 13:12

## 2019-08-17 RX ADMIN — IPRATROPIUM BROMIDE: 0.5 SOLUTION RESPIRATORY (INHALATION) at 15:32

## 2019-08-17 RX ADMIN — ROCURONIUM BROMIDE 40 MG: 10 INJECTION INTRAVENOUS at 13:07

## 2019-08-17 RX ADMIN — FENTANYL CITRATE 25 MCG: 50 INJECTION INTRAMUSCULAR; INTRAVENOUS at 13:41

## 2019-08-17 RX ADMIN — DEXAMETHASONE SODIUM PHOSPHATE 4 MG: 4 INJECTION, SOLUTION INTRAMUSCULAR; INTRAVENOUS at 13:14

## 2019-08-17 RX ADMIN — Medication 2 G: at 17:22

## 2019-08-17 RX ADMIN — FENTANYL CITRATE 25 MCG: 50 INJECTION INTRAMUSCULAR; INTRAVENOUS at 13:37

## 2019-08-17 RX ADMIN — PHENYLEPHRINE HYDROCHLORIDE 80 MCG: 10 INJECTION INTRAVENOUS at 13:22

## 2019-08-17 RX ADMIN — DONEPEZIL HYDROCHLORIDE 10 MG: 10 TABLET, ORALLY DISINTEGRATING ORAL at 22:24

## 2019-08-17 RX ADMIN — ONDANSETRON HYDROCHLORIDE 4 MG: 2 INJECTION, SOLUTION INTRAMUSCULAR; INTRAVENOUS at 13:57

## 2019-08-17 RX ADMIN — DOXYCYCLINE 100 MG: 100 INJECTION, POWDER, LYOPHILIZED, FOR SOLUTION INTRAVENOUS at 17:31

## 2019-08-17 RX ADMIN — IPRATROPIUM BROMIDE 0.5 MG: 0.5 SOLUTION RESPIRATORY (INHALATION) at 12:12

## 2019-08-17 RX ADMIN — IPRATROPIUM BROMIDE 0.5 MG: 0.5 SOLUTION RESPIRATORY (INHALATION) at 20:11

## 2019-08-17 RX ADMIN — DOCUSATE SODIUM 100 MG: 100 CAPSULE, LIQUID FILLED ORAL at 22:22

## 2019-08-17 RX ADMIN — METOPROLOL TARTRATE 25 MG: 25 TABLET ORAL at 10:39

## 2019-08-17 RX ADMIN — PROPOFOL 100 MG: 10 INJECTION, EMULSION INTRAVENOUS at 13:07

## 2019-08-17 RX ADMIN — METOPROLOL TARTRATE 25 MG: 25 TABLET ORAL at 22:24

## 2019-08-17 RX ADMIN — FENTANYL CITRATE 50 MCG: 50 INJECTION INTRAMUSCULAR; INTRAVENOUS at 13:06

## 2019-08-17 RX ADMIN — ALBUTEROL SULFATE 2 PUFF: 90 AEROSOL, METERED RESPIRATORY (INHALATION) at 13:41

## 2019-08-17 RX ADMIN — PHENYLEPHRINE HYDROCHLORIDE 80 MCG: 10 INJECTION INTRAVENOUS at 13:18

## 2019-08-17 RX ADMIN — SUGAMMADEX 100 MG: 100 INJECTION, SOLUTION INTRAVENOUS at 14:02

## 2019-08-17 RX ADMIN — SODIUM CHLORIDE, SODIUM LACTATE, POTASSIUM CHLORIDE, AND CALCIUM CHLORIDE: 600; 310; 30; 20 INJECTION, SOLUTION INTRAVENOUS at 14:05

## 2019-08-17 RX ADMIN — GUAIFENESIN 600 MG: 600 TABLET, EXTENDED RELEASE ORAL at 22:23

## 2019-08-17 RX ADMIN — LIDOCAINE HYDROCHLORIDE 5 ML: 10 INJECTION, SOLUTION INFILTRATION; PERINEURAL at 13:07

## 2019-08-17 RX ADMIN — LEVOTHYROXINE SODIUM 100 MCG: 0.1 TABLET ORAL at 10:39

## 2019-08-17 RX ADMIN — SODIUM CHLORIDE: 9 INJECTION, SOLUTION INTRAVENOUS at 11:20

## 2019-08-17 RX ADMIN — FENTANYL CITRATE 25 MCG: 50 INJECTION, SOLUTION INTRAMUSCULAR; INTRAVENOUS at 06:25

## 2019-08-17 RX ADMIN — CEFAZOLIN SODIUM 1 G: 1 INJECTION, SOLUTION INTRAVENOUS at 22:22

## 2019-08-17 RX ADMIN — SODIUM CHLORIDE, SODIUM LACTATE, POTASSIUM CHLORIDE, AND CALCIUM CHLORIDE: 600; 310; 30; 20 INJECTION, SOLUTION INTRAVENOUS at 12:51

## 2019-08-17 RX ADMIN — ASPIRIN 325 MG: 325 TABLET, DELAYED RELEASE ORAL at 22:22

## 2019-08-17 RX ADMIN — MICONAZOLE NITRATE: 20 POWDER TOPICAL at 22:23

## 2019-08-17 RX ADMIN — SODIUM CHLORIDE: 9 INJECTION, SOLUTION INTRAVENOUS at 15:30

## 2019-08-17 RX ADMIN — PREGABALIN 75 MG: 75 CAPSULE ORAL at 22:24

## 2019-08-17 RX ADMIN — Medication 10 ML: at 10:40

## 2019-08-17 RX ADMIN — ALBUTEROL SULFATE 2 PUFF: 90 AEROSOL, METERED RESPIRATORY (INHALATION) at 13:45

## 2019-08-17 ASSESSMENT — ENCOUNTER SYMPTOMS
GASTROINTESTINAL NEGATIVE: 1
SHORTNESS OF BREATH: 0
EYES NEGATIVE: 1
SORE THROAT: 0
VOMITING: 0
DIARRHEA: 0
COUGH: 1
EYE DISCHARGE: 0
EYE ITCHING: 0

## 2019-08-17 ASSESSMENT — LIFESTYLE VARIABLES: SMOKING_STATUS: 0

## 2019-08-17 ASSESSMENT — PAIN SCALES - GENERAL
PAINLEVEL_OUTOF10: 7
PAINLEVEL_OUTOF10: 7

## 2019-08-17 NOTE — PROGRESS NOTES
Karin Trinh arrived to room # 1  Presented with: right femur fractfure  Mental Status: Patient is oriented and alert. Vitals:    08/17/19 1022   BP: (!) 117/59   Pulse: 76   Resp: 14   Temp: 97.5 °F (36.4 °C)   SpO2: 90%     Patient safety contract and falls prevention contract reviewed with patient Yes. Oriented Patient to room. Call light within reach. Yes.   Needs, issues or concerns expressed at this time: no.      Electronically signed by Markell Cotto RN on 8/17/2019 at 11:27 AM

## 2019-08-17 NOTE — PROGRESS NOTES
Pharmacy Renal Adjustment    Damien Raya is a 80 y.o. female. Pharmacy has adjusted medications per protocol. Recent Labs     08/17/19  0548   BUN 10       Recent Labs     08/17/19  0548   CREATININE 0.6       Calculated crcl = 47.5 using cockcroft & gault     Height:   Ht Readings from Last 1 Encounters:   08/17/19 5' 4\" (1.626 m)     Weight:  Wt Readings from Last 1 Encounters:   08/17/19 95 lb (43.1 kg)       Plan: Adjust the following medications:  Change Cefepime to 2gm IV q 12 hours for pneumonia.     QUENTIN SUMNER, PHARM D, 8/17/2019, 1:06 PM

## 2019-08-17 NOTE — CONSULTS
murmur     Hyperlipidemia 2009    Cholesterol management per Fito Lira Hypertension 2009    Hypothyroidism     Multiple sclerosis ([facility] [address]     Pneumonia 05/2019    Seizure disorder ([facility] [address]     Possible    Thyroid disease 2009    Tobacco abuse, continuous 2009     SurgicalHistory  Past Surgical History:   Procedure Laterality Date    ABDOMINAL AORTIC ANEURYSM REPAIR  [facility]/12.14.09    5.2 CM AAA; 14MM INTERPOSITIONAL DACRON GRAFT    APPENDECTOMY      CARDIAC CATHETERIZATION  06/11/2002    EF60%     CARDIAC CATHETERIZATION  6/17/15  [address]    EF 70%    CARDIAC CATHETERIZATION  12/11/2015    EF 60%, x2 drug-eluting stents LAD.  CARDIAC VALVE SURGERY  [facility]/12.11.09    & R/O    CAROTID ENDARTERECTOMY Right 1/11/2016    CAROTID ENDARTERECTOMY WITH EEG  performed by Michelle Ramos MD at [address] [place] [address]      DIAGNOSTIC CARDIAC CATH LAB PROCEDURE  6/2/03    EF over 60% Normal LV function and hemodynamics, Mild nonocclusive CAD, w/o hemodynamically significant  lesions  identified     DIAGNOSTIC CARDIAC CATH LAB PROCEDURE  2/24/12    2 stents    ENDOSCOPY, COLON, DIAGNOSTIC      EYE SURGERY Bilateral     cataracts    FOOT SURGERY Right     exc. lesion/bx.  FRACTURE SURGERY Right     elbow    OVARIAN CYST SURGERY      THYROID SURGERY      VASCULAR SURGERY  05- [facility]    Percutaneous Transluminal Arterial Angioplasty of left superficial femoral artery using a 6*2 cutting balloon reducing a 90% stenosis to 0% stenosis    VASCULAR SURGERY  12/11/2015 [facility]    Procedure: aborted induction for carotis endarterectomy    VASCULAR SURGERY  1/11/2015 [facility]    Procedure: right CEA, eversion technique    VASCULAR SURGERY  06/15/2019    [facility]. Aortogram and runoff. Right common femoral artery 5 Ukrainian sheath size up to 6 Ukrainian sheath for perclose closure. Allergies  Allergies   Allergen Reactions    Morphine      If patient has too much it causes hallucinations.  If minor amount patient can take this.      Medications    sodium chloride flush, 10 mL, Intravenous, 2 times per day    aspirin, 81 mg, Oral, Daily    atorvastatin, 10 mg, Oral, Daily    vitamin B and C, 1 tablet, Oral, Daily    clopidogrel, 75 mg, Oral, Daily    donepezil, 10 mg, Oral, Nightly    pantoprazole, 40 mg, Oral, QAM AC    furosemide, 40 mg, Oral, Daily    levothyroxine, 100 mcg, Oral, Daily    metoprolol tartrate, 25 mg, Oral, BID    miconazole, , Topical, BID    potassium chloride, 20 mEq, Oral, Daily    pregabalin, 75 mg, Oral, Nightly    ipratropium, 0.5 mg, Nebulization, 4x daily    guaiFENesin, 600 mg, Oral, BID   Social History   reports that she has quit smoking. She has a 100.00 pack-year smoking history. She has never used smokeless tobacco. She reports that she does not drink alcohol or use drugs. Family History  family history includes Coronary Art Dis in her father; Heart Attack in her father and mother; Other in her sister and another family member. Review of Systems:  Review of Systems   Constitutional: Negative for chills and fever. HENT: Negative. Negative for congestion and sore throat. Eyes: Negative. Negative for discharge and itching. Respiratory: Positive for cough (non productive for last few weeks). Negative for shortness of breath. Cardiovascular: Negative. Negative for chest pain, palpitations and leg swelling. Gastrointestinal: Negative. Negative for diarrhea and vomiting. Musculoskeletal:        Right hip pain    Skin: Negative. Negative for rash and wound. Neurological:        Hx of dementia on aricept    Psychiatric/Behavioral: Negative. Negative for agitation and behavioral problems. Physical Exam:   height is 5' 4\" (1.626 m) and weight is 95 lb (43.1 kg). Her temporal temperature is 97.5 °F (36.4 °C). Her blood pressure is 117/59 (abnormal) and her pulse is 76. Her respiration is 14 and oxygen saturation is 90%.      Intake/Output Summary (Last 24 hours) at Carotid artery stenosis    AAA [facility]abdominal aortic aneurysm) without rupture ([facility])    Right carotid bruit    Smoker    Chronic respiratory failure (HCC)    Acute ST elevation myocardial infarction (STEMI) involving left anterior descending (LAD) coronary artery (HCC)    Stenosis of right carotid artery    Vascular disease    2 KELY to LAD    CAD (coronary artery disease)    Altered mental state    Tremor    Fatigue    Status post insertion of drug-eluting stent into left anterior descending artery    H/O cardiac murmur    Medication management    PVD (peripheral vascular disease) ([facility]    Atherosclerosis of native artery of left lower extremity with ulceration of midfoot ([facility])    Pneumonia    Mitral valve stenosis    Diastolic dysfunction    Intertrochanteric fracture of right femur, closed, initial encounter ([facility] [address]     Pulmonary Assessment:  New problem (to me), with additional workup planned:   1. Possible Pneumonia although more likely indicates progression of underlying process in RUL,    New problem (to me), no additional workup planned:   1. COPD   2. Hx of MS  3. Hx of Seizures   4. Dementia   5. Hypertension  6. Significant cardiovascular disease  Other problems either stable, failing to improve or worsenin. Right Hip Fracture- per Dr. Jody Maharaj     Recommend/plan:   Possible Pneumonia although more likely indicates progression of underlying process in RUL, Cannot exclude cancer as differential. This will have no bearing on hip repair. Will treat with antibiotics in event it is PNA. No other indicators than the CXR. · Will begin cefepime and Doxycycline   · Continue Mucinex  · Oxygen as needed to keep )2 sat >= 90%    Thank you for this consult. We will follow  Electronically signed by Sandra Rausch on 19 at 12:26 PM    Physician Substantive portion:  Patient known to the pulmonary practice with moderate COPD who now has a hip fracture requiring repair.   She is without acute

## 2019-08-17 NOTE — ANESTHESIA PRE PROCEDURE
every 4 hours as needed for Wheezing    Historical Provider, MD   nitroGLYCERIN (NITROSTAT) 0.4 MG SL tablet Place 1 tablet under the tongue every 5 minutes as needed for Chest pain.  8/23/13   ALLEN Campbell       Current medications:    Current Facility-Administered Medications   Medication Dose Route Frequency Provider Last Rate Last Dose    sodium chloride flush 0.9 % injection 10 mL  10 mL Intravenous 2 times per day Bayron Salinas MD   10 mL at 08/17/19 1040    sodium chloride flush 0.9 % injection 10 mL  10 mL Intravenous PRN Bayron Salinas MD        acetaminophen (TYLENOL) tablet 650 mg  650 mg Oral Q4H PRN Bayron Salinas MD        fentaNYL (SUBLIMAZE) injection 25 mcg  25 mcg Intravenous Q1H PRN Bayron Salinas MD        Or    fentaNYL (SUBLIMAZE) injection 50 mcg  50 mcg Intravenous Q1H PRN Bayron Salinas MD        ondansetron [facility]) injection 4 mg  4 mg Intravenous Q8H PRN Bayron Salinas MD        aspirin EC tablet 81 mg  81 mg Oral Daily Bayron Salinas MD        atorvastatin (LIPITOR) tablet 10 mg  10 mg Oral Daily Bayorn Salinas MD        vitamin B and C (TOTAL B-C) 1 tablet  1 tablet Oral Daily Bayron Salinas MD        clopidogrel (PLAVIX) tablet 75 mg  75 mg Oral Daily Bayron Salinas MD        donepezil (ARICEPT ODT) disintegrating tablet 10 mg  10 mg Oral Nightly Bayron Salinas MD        pantoprazole (PROTONIX) tablet 40 mg  40 mg Oral QAM AC Bayron Salinas MD        furosemide (LASIX) tablet 40 mg  40 mg Oral Daily Bayron Salnias MD        levothyroxine (SYNTHROID) tablet 100 mcg  100 mcg Oral Daily Bayron Salinas MD   100 mcg at 08/17/19 1039    metoprolol tartrate (LOPRESSOR) tablet 25 mg  25 mg Oral BID Bayron Salinas MD   25 mg at 08/17/19 1039    nitroGLYCERIN (NITROSTAT) SL tablet 0.4 mg  0.4 mg Sublingual Q5 Min PRN Bayron Salinas MD        miconazole (MICOTIN) 2 % powder   Topical BID Bayron Salinas MD        potassium chloride (KLOR-CON M) extended release tablet 20 mEq  20 mEq Oral Daily Jana Shen MD        pregabalin (LYRICA) capsule 75 mg  75 mg Oral Nightly Jana Shen MD        0.9 % sodium chloride infusion   Intravenous Continuous Jana Shen MD 75 mL/hr at 08/17/19 1120      ipratropium (ATROVENT) 0.02 % nebulizer solution 0.5 mg  0.5 mg Nebulization 4x daily Jana Shen MD   0.5 mg at 08/17/19 1212    albuterol (PROVENTIL) nebulizer solution 2.5 mg  2.5 mg Nebulization Q6H PRN Jana Shen MD        guaiFENesin [facility]) extended release tablet 600 mg  600 mg Oral BID Jana Shen MD        metoprolol (LOPRESSOR) injection 2.5 mg  2.5 mg Intravenous Q6H PRN Jana Shen MD        cefepime (MAXIPIME) 1 g in sterile water 10 mL IV syringe  1 g Intravenous Q12H Roanne Essex, APRN        doxycycline (VIBRAMYCIN) 100 mg in dextrose 5 % 100 mL IVPB  100 mg Intravenous Q12H Roanne Essex, APRN           Allergies: Allergies   Allergen Reactions    Morphine      If patient has too much it causes hallucinations. If minor amount patient can take this.         Problem List:    Patient Active Problem List   Diagnosis Code    Mixed hyperlipidemia E78.2    Hypertension I10    Hypothyroidism E03.9    Atherosclerosis of native artery of extremity with intermittent claudication ([facility] [address] I70.219    Multiple sclerosis ([address] G35    Seizure disorder ([facility] [address] G40.909    Cerebrovascular disease I67.9    Carotid artery stenosis I65.29    AAA (abdominal aortic aneurysm) without rupture ([facility]) I71.4    Right carotid bruit R09.89    Smoker F17.200    Chronic respiratory failure (HCC) J96.10    Acute ST elevation myocardial infarction (STEMI) involving left anterior descending (LAD) coronary artery ([facility]) I21.02    Stenosis of right carotid artery I65.21    Vascular disease I99.9    2 KELY to LAD Z95.5    CAD (coronary artery disease) I25.10    Altered mental state R41.82    Tremor R25.1   

## 2019-08-17 NOTE — H&P
PEETJAYESH WALKER [employer] [facility] ZAFAR [address], [address] [facility]                              HISTORY AND PHYSICAL    PATIENT NAME: Ronel Atkinson                    :        1935  MED REC NO:   [id number]                              ROOM:       [facility]  ACCOUNT NO:   [de-identified]                           ADMIT DATE: 2019  PROVIDER:     Yonatan Hutchinson MD    HISTORY OF PRESENT ILLNESS:  This is an 80-year-old woman who is  residing at [facility] who apparently slipped out of her wheelchair  early this morning falling and sustaining a fracture of the femoral neck  on the right hip area. She is admitted and there are plans for Dr. Marylee Durham  to do a hip surgery some time later today. The patient denies any chest  pain, shortness of breath. She denies any syncope. She denies nausea,  vomiting. PAST MEDICAL HISTORY:  Significant for a mass. She has a history of a  previous CVA, history of carotid disease, history of previous MI,  hypertension, hyperlipidemia, hypothyroidism, multiple sclerosis,  previous pneumonia, COPD with continued tobacco use and significant  arthritis. She has had prior angioplasties of the femoral artery. She  has had a carotid endarterectomy. She has had prior partial  thyroidectomy. She has had previous hysterectomy with an ovariectomy;  previous fracture, right elbow, with surgery. She has had upper and  lower endoscopies. She has had appendectomy and an abdominal aortic  aneurysm repair, actually that was an open repair in . FAMILY HISTORY:  Positive for heart disease in both parents and aortic  aneurysm in a sister and a niece. SOCIAL HISTORY:  She has a history of being a tobacco user, although now  it says she has actually stopped and that is, I guess, in the recent  past, probably when she moved into [facility]. She has no history of  alcohol use. She has a very supportive family.     MEDICATIONS:  Her meds on admission, metoprolol, it was recently  decreased 25 b.i.d.; aspirin 81 daily; Aricept 10 mg daily; Lasix 40 mg  daily; takes an iron tablet daily; Plavix 75 daily; Nexium 40 daily;  atorvastatin 10 daily; Synthroid 0.1 mg daily; Lyrica 75 nightly. Amlodipine was recently stopped, losartan was recently stopped. She has  Xopenex as needed HFA 1 to 2 puffs q.4 hours p.r.n. and nitroglycerine  sublingual as needed. ALLERGIES:  She has allergy to MORPHINE; it caused actually  hallucinations, not really an allergy but more of an adverse reaction. CODE STATUS:  She is a DNR status as per the family's recommendation. PHYSICAL EXAMINATION:  VITAL SIGNS:  Blood pressure 136/56, pulse 62, respirations 18,  temperature 98.3, O2 sat is 95% on room air. HEENT:  Normocephalic, atraumatic. NECK:  Supple. CHEST:  Clear. HEART:  Regular. ABDOMEN:  Benign. EXTREMITIES:  She has pain in the right hip with any type of movement of  the right leg. She has some trace to 1+ edema in both ankles, which is  chronic and stable. She has decreased pulses in her feet bilaterally  and symmetrically and stable from previous. LABORATORY DATA:  Her laboratory evaluation reveals sodium 139,  potassium 3.5, chloride is 101, CO2 is 25, BUN 10, creatinine is 0.6,  GFR is greater than 60, glucose 129, calcium is 8.8. Total protein 7.1,  albumin 3.3, alk phos 100, ALT is less than 5, AST is 12, bilirubin 0.3. White count 11.9 , hemoglobin 10.2, platelets 675. INR is 1.27. Blood  type, A positive with negative antibody screen. CT of the pelvis shows  right intertrochanteric femoral fracture with additional fracture line  at the base of the greater trochanter. She has arthritic changes in  both hips and spine. Old fracture suspected of the sacrum and bilateral  _____ pelvis. Chest x-ray shows 4.4 cm rounded right suprahilar opacity  and then x-ray of the hip shows the fracture that is mentioned above. ASSESSMENT:  1.   Right hip

## 2019-08-17 NOTE — PROGRESS NOTES
4 Eyes Skin Assessment    Christian Roberts is being assessed upon: Admission    I agree that I, Flaco Goodson, along with Cinthya Felix rn have performed a thorough Head to Toe Skin Assessment on the patient. ALL assessment sites listed below have been assessed. Areas assessed by both nurses:     [x]   Head, Face, and Ears   [x]   Shoulders, Back, and Chest  [x]   Arms, Elbows, and Hands   [x]   Coccyx, Sacrum, and Ischium  [x]   Legs, Feet, and Heels    Does the Patient have Skin Breakdown?  No     Manuel Prevention initiated: Yes  Wound Care Orders initiated: No    [facility] nurse consulted for Pressure Injury (Stage 3,4, Unstageable, DTI, NWPT, and Complex wounds) and New or Established Ostomies: No        Primary Nurse eSignature: Flaco Goodson RN on 8/17/2019 at 11:29 AM      Co-Signer eSignature: {Esignature:[id number]}

## 2019-08-17 NOTE — ED PROVIDER NOTES
(ARICEPT ODT) 10 MG DISINTEGRATING TABLET    Take 10 mg by mouth nightly    ESOMEPRAZOLE (NEXIUM) 40 MG CAPSULE    Take 40 mg by mouth every morning (before breakfast). FUROSEMIDE (LASIX) 40 MG TABLET    Take 40 mg by mouth daily    IRON PO    Take by mouth    LEVALBUTEROL (XOPENEX HFA) 45 MCG/ACT INHALER    Inhale 1-2 puffs into the lungs every 4 hours as needed for Wheezing    LEVOTHYROXINE SODIUM (SYNTHROID PO)    Take 0.1 mcg by mouth. LOSARTAN (COZAAR) 50 MG TABLET    TAKE 1 TABLET DAILY    METOPROLOL SUCCINATE (TOPROL XL) 50 MG EXTENDED RELEASE TABLET    Take 50 mg by mouth daily    METOPROLOL TARTRATE (LOPRESSOR) 50 MG TABLET    Take 50 mg by mouth 2 times daily    NITROGLYCERIN (NITROSTAT) 0.4 MG SL TABLET    Place 1 tablet under the tongue every 5 minutes as needed for Chest pain. NYSTATIN (MYCOSTATIN) 189177 UNIT/GM POWDER    Apply topically 4 times daily. POTASSIUM CHLORIDE (KLOR-CON M) 20 MEQ TBCR EXTENDED RELEASE TABLET    Take 1 tablet by mouth daily    PREGABALIN (LYRICA) 75 MG CAPSULE    Take 75 mg by mouth nightly. ALLERGIES     Morphine    FAMILY HISTORY       Family History   Problem Relation Age of Onset    Heart Attack Mother     Heart Attack Father     Coronary Art Dis Father     Other Other         AAA/2009    Other Sister         AAA/2009          SOCIAL HISTORY       Social History     Socioeconomic History    Marital status:       Spouse name: None    Number of children: 4    Years of education: None    Highest education level: None   Occupational History     Employer: RETIRED   Social Needs    Financial resource strain: None    Food insecurity:     Worry: None     Inability: None    Transportation needs:     Medical: None     Non-medical: None   Tobacco Use    Smoking status: Current Every Day Smoker     Packs/day: 2.00     Years: 50.00     Pack years: 100.00    Smokeless tobacco: Never Used   Substance and Sexual Activity    Alcohol use: No   

## 2019-08-17 NOTE — CONSULTS
the shoulder, elbow, wrist or hand. Unrestricted full function motion is present. Stability is normal with provocative tests, 5/5 strength, and skin is normal.     Right lower extremity exam:  There is no tenderness to palpation about the knee, ankle or foot, but there is discomfort through the right hip and groin. There is pain with any attempted active or passive range of motion through the right hip. The overlying skin is intact and the muscle compartments are soft and compressible. Left lower extremity exam:  There is no tenderness to palpation about the hip, knee, ankle or foot. Unrestricted full function motion is present.   Stability is normal with provocative tests, 5/5 strength, and skin is normal.      DATA:    CBC with Differential:    Lab Results   Component Value Date    WBC 11.9 08/17/2019    RBC 3.66 08/17/2019    HGB 10.2 08/17/2019    HCT 33.4 08/17/2019    HCT 33.7 02/20/2012     08/17/2019     02/20/2012    MCV 91.3 08/17/2019    MCH 27.9 08/17/2019    MCHC 30.5 08/17/2019    RDW 15.3 08/17/2019    BANDSPCT 5 06/26/2018    METASPCT 2 06/26/2018    LYMPHOPCT 8.1 08/17/2019    MONOPCT 7.6 08/17/2019    MYELOPCT 1 06/26/2018    EOSPCT 1.8 02/20/2012    BASOPCT 0.3 08/17/2019    MONOSABS 0.90 08/17/2019    LYMPHSABS 1.0 08/17/2019    EOSABS 0.10 08/17/2019    BASOSABS 0.00 08/17/2019     CMP:    Lab Results   Component Value Date     08/17/2019     02/20/2012    K 3.5 08/17/2019    K 4.0 02/20/2012     08/17/2019     02/20/2012    CO2 25 08/17/2019    BUN 10 08/17/2019    CREATININE 0.6 08/17/2019    CREATININE 0.6 02/20/2012    LABGLOM >60 08/17/2019    GLUCOSE 129 08/17/2019    PROT 7.1 08/17/2019    CALCIUM 8.8 08/17/2019    BILITOT 0.3 08/17/2019    ALKPHOS 100 08/17/2019    AST 12 08/17/2019    ALT <5 08/17/2019     BMP:    Lab Results   Component Value Date     08/17/2019     02/20/2012    K 3.5 08/17/2019    K 4.0 02/20/2012

## 2019-08-17 NOTE — BRIEF OP NOTE
Brief Postoperative Note  ______________________________________________________________    Patient: Nicole Sood  YOB: 1935  MRN: [id number]  Date of Procedure: 8/17/2019    Pre-Op Diagnosis: Right Trochanteric Fracture    Post-Op Diagnosis: Same       Procedure(s): FEMUR IM NAIL RAYMON INSERTION    Anesthesia: General    Surgeon(s):  Don Hartman MD    Assistant: None    Estimated Blood Loss (mL): 50    Complications: None    Specimens:   * No specimens in log *    Implants:  Implant Name Type Inv.  Item Serial No.  Lot No. LRB No. Used   NAIL TFN SHORT [id number] 130DEG Screw/Plate/Nail/Raymon NAIL TFN SHORT [id number] 130DEG  SYNTHES [id number] Right 1   IMPL BLADE HELICAL FENEST TFNA 98VV ST Screw/Plate/Nail/Raymon IMPL BLADE HELICAL FENEST TFNA 98QX ST  [id number]  Right 1   SCREW LK W/ T25 STARDRIVE [id number] Screw/Plate/Nail/Raymon SCREW LK W/ T25 STARDRIVE [id number]  SYNTHES  Right 1         Drains:   Urethral Catheter (Active)   Catheter Indications Need for fluid management in critically ill patients in a critical care setting not able to be managed by other means such as BSC with hat, bedpan, urinal, condom catheter, or short term intermittent urethral catherization 8/17/2019  7:40 AM   Urine Color Yellow 8/17/2019  9:16 AM   Urine Appearance Clear 8/17/2019  9:16 AM   Output (mL) 250 mL 8/17/2019  7:40 AM       Findings: Minimally-displaced right intertrochanteric femur fracture    Don Hartman MD  Date: 8/17/2019  Time: 2:15 PM

## 2019-08-18 ENCOUNTER — APPOINTMENT (OUTPATIENT)
Dept: CT IMAGING | Age: 84
DRG: 480 | End: 2019-08-18
Payer: MEDICARE

## 2019-08-18 ENCOUNTER — OUTSIDE FACILITY SERVICE (OUTPATIENT)
Dept: PULMONOLOGY | Facility: CLINIC | Age: 84
End: 2019-08-18

## 2019-08-18 LAB
ANION GAP SERPL CALCULATED.3IONS-SCNC: 15 MMOL/L (ref 7–19)
BASOPHILS ABSOLUTE: 0 K/UL (ref 0–0.2)
BASOPHILS RELATIVE PERCENT: 0.2 % (ref 0–1)
BUN BLDV-MCNC: 10 MG/DL (ref 8–23)
CALCIUM SERPL-MCNC: 7.9 MG/DL (ref 8.8–10.2)
CHLORIDE BLD-SCNC: 104 MMOL/L (ref 98–111)
CO2: 20 MMOL/L (ref 22–29)
CREAT SERPL-MCNC: 0.5 MG/DL (ref 0.5–0.9)
EOSINOPHILS ABSOLUTE: 0 K/UL (ref 0–0.6)
EOSINOPHILS RELATIVE PERCENT: 0 % (ref 0–5)
GFR NON-AFRICAN AMERICAN: >60
GLUCOSE BLD-MCNC: 127 MG/DL (ref 74–109)
HCT VFR BLD CALC: 29.9 % (ref 37–47)
HEMOGLOBIN: 8.7 G/DL (ref 12–16)
IMMATURE GRANULOCYTES #: 0.2 K/UL
LYMPHOCYTES ABSOLUTE: 0.6 K/UL (ref 1.1–4.5)
LYMPHOCYTES RELATIVE PERCENT: 5.5 % (ref 20–40)
MCH RBC QN AUTO: 27 PG (ref 27–31)
MCHC RBC AUTO-ENTMCNC: 29.1 G/DL (ref 33–37)
MCV RBC AUTO: 92.9 FL (ref 81–99)
MONOCYTES ABSOLUTE: 0.6 K/UL (ref 0–0.9)
MONOCYTES RELATIVE PERCENT: 5.2 % (ref 0–10)
NEUTROPHILS ABSOLUTE: 9.4 K/UL (ref 1.5–7.5)
NEUTROPHILS RELATIVE PERCENT: 87.7 % (ref 50–65)
PDW BLD-RTO: 15.2 % (ref 11.5–14.5)
PLATELET # BLD: 271 K/UL (ref 130–400)
PMV BLD AUTO: 10.6 FL (ref 9.4–12.3)
POTASSIUM SERPL-SCNC: 3.5 MMOL/L (ref 3.5–5)
RBC # BLD: 3.22 M/UL (ref 4.2–5.4)
SODIUM BLD-SCNC: 139 MMOL/L (ref 136–145)
WBC # BLD: 10.7 K/UL (ref 4.8–10.8)

## 2019-08-18 PROCEDURE — 6370000000 HC RX 637 (ALT 250 FOR IP): Performed by: FAMILY MEDICINE

## 2019-08-18 PROCEDURE — 6370000000 HC RX 637 (ALT 250 FOR IP): Performed by: ORTHOPAEDIC SURGERY

## 2019-08-18 PROCEDURE — 2580000003 HC RX 258: Performed by: ORTHOPAEDIC SURGERY

## 2019-08-18 PROCEDURE — 2500000003 HC RX 250 WO HCPCS: Performed by: ORTHOPAEDIC SURGERY

## 2019-08-18 PROCEDURE — 51701 INSERT BLADDER CATHETER: CPT

## 2019-08-18 PROCEDURE — 2700000000 HC OXYGEN THERAPY PER DAY

## 2019-08-18 PROCEDURE — 6360000004 HC RX CONTRAST MEDICATION: Performed by: FAMILY MEDICINE

## 2019-08-18 PROCEDURE — 99232 SBSQ HOSP IP/OBS MODERATE 35: CPT | Performed by: INTERNAL MEDICINE

## 2019-08-18 PROCEDURE — 6360000002 HC RX W HCPCS: Performed by: ORTHOPAEDIC SURGERY

## 2019-08-18 PROCEDURE — 2580000003 HC RX 258: Performed by: FAMILY MEDICINE

## 2019-08-18 PROCEDURE — 71260 CT THORAX DX C+: CPT

## 2019-08-18 PROCEDURE — 1210000000 HC MED SURG R&B

## 2019-08-18 PROCEDURE — 6360000002 HC RX W HCPCS: Performed by: FAMILY MEDICINE

## 2019-08-18 PROCEDURE — 36415 COLL VENOUS BLD VENIPUNCTURE: CPT

## 2019-08-18 PROCEDURE — 94640 AIRWAY INHALATION TREATMENT: CPT

## 2019-08-18 PROCEDURE — 80048 BASIC METABOLIC PNL TOTAL CA: CPT

## 2019-08-18 PROCEDURE — 51798 US URINE CAPACITY MEASURE: CPT

## 2019-08-18 PROCEDURE — 85025 COMPLETE CBC W/AUTO DIFF WBC: CPT

## 2019-08-18 RX ORDER — FUROSEMIDE 40 MG/1
40 TABLET ORAL ONCE
Status: COMPLETED | OUTPATIENT
Start: 2019-08-18 | End: 2019-08-18

## 2019-08-18 RX ORDER — SODIUM CHLORIDE 9 MG/ML
500 INJECTION, SOLUTION INTRAVENOUS ONCE
Status: COMPLETED | OUTPATIENT
Start: 2019-08-18 | End: 2019-08-18

## 2019-08-18 RX ADMIN — Medication 10 ML: at 10:23

## 2019-08-18 RX ADMIN — DOXYCYCLINE 100 MG: 100 INJECTION, POWDER, LYOPHILIZED, FOR SOLUTION INTRAVENOUS at 05:24

## 2019-08-18 RX ADMIN — GUAIFENESIN 600 MG: 600 TABLET, EXTENDED RELEASE ORAL at 20:43

## 2019-08-18 RX ADMIN — GUAIFENESIN 600 MG: 600 TABLET, EXTENDED RELEASE ORAL at 09:37

## 2019-08-18 RX ADMIN — POTASSIUM CHLORIDE 20 MEQ: 20 TABLET, EXTENDED RELEASE ORAL at 09:37

## 2019-08-18 RX ADMIN — IPRATROPIUM BROMIDE 0.5 MG: 0.5 SOLUTION RESPIRATORY (INHALATION) at 15:03

## 2019-08-18 RX ADMIN — ASPIRIN 325 MG: 325 TABLET, DELAYED RELEASE ORAL at 09:36

## 2019-08-18 RX ADMIN — VITAMIN C 1 TABLET: TAB at 09:36

## 2019-08-18 RX ADMIN — MICONAZOLE NITRATE: 20 POWDER TOPICAL at 09:37

## 2019-08-18 RX ADMIN — DOCUSATE SODIUM 100 MG: 100 CAPSULE, LIQUID FILLED ORAL at 20:44

## 2019-08-18 RX ADMIN — IOPAMIDOL 95 ML: 755 INJECTION, SOLUTION INTRAVENOUS at 20:08

## 2019-08-18 RX ADMIN — ASPIRIN 325 MG: 325 TABLET, DELAYED RELEASE ORAL at 20:44

## 2019-08-18 RX ADMIN — Medication 10 ML: at 20:44

## 2019-08-18 RX ADMIN — IPRATROPIUM BROMIDE 0.5 MG: 0.5 SOLUTION RESPIRATORY (INHALATION) at 11:30

## 2019-08-18 RX ADMIN — ENOXAPARIN SODIUM 40 MG: 40 INJECTION SUBCUTANEOUS at 10:23

## 2019-08-18 RX ADMIN — PREGABALIN 75 MG: 75 CAPSULE ORAL at 20:43

## 2019-08-18 RX ADMIN — IPRATROPIUM BROMIDE 0.5 MG: 0.5 SOLUTION RESPIRATORY (INHALATION) at 20:13

## 2019-08-18 RX ADMIN — IPRATROPIUM BROMIDE 0.5 MG: 0.5 SOLUTION RESPIRATORY (INHALATION) at 07:32

## 2019-08-18 RX ADMIN — ACETAMINOPHEN 650 MG: 325 TABLET ORAL at 20:44

## 2019-08-18 RX ADMIN — ACETAMINOPHEN 650 MG: 325 TABLET ORAL at 02:46

## 2019-08-18 RX ADMIN — PANTOPRAZOLE SODIUM 40 MG: 40 TABLET, DELAYED RELEASE ORAL at 06:04

## 2019-08-18 RX ADMIN — MICONAZOLE NITRATE: 20 POWDER TOPICAL at 23:07

## 2019-08-18 RX ADMIN — DONEPEZIL HYDROCHLORIDE 10 MG: 10 TABLET, ORALLY DISINTEGRATING ORAL at 20:43

## 2019-08-18 RX ADMIN — DOXYCYCLINE 100 MG: 100 INJECTION, POWDER, LYOPHILIZED, FOR SOLUTION INTRAVENOUS at 17:35

## 2019-08-18 RX ADMIN — Medication 2 G: at 05:24

## 2019-08-18 RX ADMIN — CLOPIDOGREL BISULFATE 75 MG: 75 TABLET ORAL at 09:36

## 2019-08-18 RX ADMIN — SODIUM CHLORIDE 500 ML: 9 INJECTION, SOLUTION INTRAVENOUS at 16:21

## 2019-08-18 RX ADMIN — ATORVASTATIN CALCIUM 10 MG: 10 TABLET, FILM COATED ORAL at 09:36

## 2019-08-18 RX ADMIN — Medication 2 G: at 17:34

## 2019-08-18 RX ADMIN — CEFAZOLIN SODIUM 1 G: 1 INJECTION, SOLUTION INTRAVENOUS at 05:24

## 2019-08-18 RX ADMIN — LEVOTHYROXINE SODIUM 100 MCG: 0.1 TABLET ORAL at 06:04

## 2019-08-18 RX ADMIN — SODIUM CHLORIDE: 9 INJECTION, SOLUTION INTRAVENOUS at 02:50

## 2019-08-18 RX ADMIN — DOCUSATE SODIUM 100 MG: 100 CAPSULE, LIQUID FILLED ORAL at 09:36

## 2019-08-18 RX ADMIN — FUROSEMIDE 40 MG: 40 TABLET ORAL at 16:21

## 2019-08-18 ASSESSMENT — ENCOUNTER SYMPTOMS
VOMITING: 0
DIARRHEA: 0
SORE THROAT: 0
EYES NEGATIVE: 1
GASTROINTESTINAL NEGATIVE: 1
COUGH: 1
SHORTNESS OF BREATH: 0
EYE ITCHING: 0
EYE DISCHARGE: 0

## 2019-08-18 ASSESSMENT — PAIN SCALES - GENERAL
PAINLEVEL_OUTOF10: 5
PAINLEVEL_OUTOF10: 5

## 2019-08-18 NOTE — PROGRESS NOTES
Progress Note  8/18/2019 9:30 AM  Subjective:   Admit Date: 8/17/2019  PCP: Bayron Sailnas MD    Interval History: \"I feel lousy\"    Dietary Nutrition Supplements: Standard High Calorie Oral Supplement  DIET CARDIAC; Intake/Output Summary (Last 24 hours) at 8/18/2019 0930  Last data filed at 8/18/2019 0605  Gross per 24 hour   Intake 2508. 12 ml   Output 1295 ml   Net 1213.12 ml     Medications:      sodium chloride 75 mL/hr at 08/18/19 0250      atorvastatin  10 mg Oral Daily    vitamin B and C  1 tablet Oral Daily    clopidogrel  75 mg Oral Daily    donepezil  10 mg Oral Nightly    pantoprazole  40 mg Oral QAM AC    furosemide  40 mg Oral Daily    levothyroxine  100 mcg Oral Daily    metoprolol tartrate  25 mg Oral BID    miconazole   Topical BID    potassium chloride  20 mEq Oral Daily    pregabalin  75 mg Oral Nightly    ipratropium  0.5 mg Nebulization 4x daily    guaiFENesin  600 mg Oral BID    cefepime  2 g Intravenous Q12H    doxycycline (VIBRAMYCIN) IV  100 mg Intravenous Q12H    sodium chloride flush  10 mL Intravenous 2 times per day    docusate sodium  100 mg Oral BID    aspirin  325 mg Oral BID     Recent Labs     08/17/19  0548 08/18/19  0251   WBC 11.9* 10.7   HGB 10.2* 8.7*    271     Recent Labs     08/17/19  0548 08/18/19  0251    139   K 3.5 3.5    104   CO2 25 20*   BUN 10 10   CREATININE 0.6 0.5   GLUCOSE 129* 127*     Recent Labs     08/17/19  0548   AST 12   ALT <5*   BILITOT 0.3   ALKPHOS 100       Objective:   Vitals: BP (!) 109/56   Pulse 53   Temp 97 °F (36.1 °C)   Resp 18   Ht 5' 4\" (1.626 m)   Wt 95 lb (43.1 kg)   SpO2 94%   BMI 16.31 kg/m²   General appearance: alert and cooperative with exam  Lungs: Bilateral wheezes and rhonci  Heart: regular rate and rhythm, S1, S2 normal, no murmur, click, rub or gallop  Abdomen: soft, non-tender; bowel sounds normal; no masses,  no organomegaly  Extremities: extremities normal, atraumatic, no

## 2019-08-18 NOTE — PROGRESS NOTES
(43.1 kg). Her temperature is 97 °F (36.1 °C). Her blood pressure is 109/56 (abnormal) and her pulse is 53. Her respiration is 18 and oxygen saturation is 94%. Intake/Output Summary (Last 24 hours) at 8/18/2019 0917  Last data filed at 8/18/2019 0605  Gross per 24 hour   Intake 2508. 12 ml   Output 1295 ml   Net 1213.12 ml     Physical Exam   Constitutional: She is oriented to person, place, and time. She appears well-developed and well-nourished. No distress. HENT:   Head: Normocephalic and atraumatic. Eyes: Pupils are equal, round, and reactive to light. Right eye exhibits no discharge. Left eye exhibits no discharge. Neck: Normal range of motion. Neck supple. Cardiovascular: Normal rate, regular rhythm and normal heart sounds. Exam reveals no gallop and no friction rub. No murmur heard. Pulmonary/Chest: Effort normal. She has wheezes. Rhonchi    Abdominal: Soft. Bowel sounds are normal.   Musculoskeletal: She exhibits tenderness (right hip). She exhibits no edema. Neurological: She is alert and oriented to person, place, and time. Skin: Skin is warm and dry. Psychiatric: She has a normal mood and affect. Her behavior is normal. Judgment and thought content normal.     Recent Labs     08/17/19  0548 08/18/19 0251   WBC 11.9* 10.7   RBC 3.66* 3.22*   HGB 10.2* 8.7*   HCT 33.4* 29.9*    271   MCV 91.3 92.9   MCH 27.9 27.0   MCHC 30.5* 29.1*   RDW 15.3* 15.2*      Recent Labs     08/17/19  0548 08/18/19  0251    139   K 3.5 3.5    104   CO2 25 20*   BUN 10 10   CREATININE 0.6 0.5   CALCIUM 8.8 7.9*   GLUCOSE 129* 127*      No results for input(s): PHART, IGO6LID, PO2ART, LVL1HTF, L2NHJKYP, BEART in the last 72 hours. Recent Labs     08/17/19  0548 08/18/19 0251   AST 12  --    ALT <5*  --    ALKPHOS 100  --    BILITOT 0.3  --    CALCIUM 8.8 7.9*   INR 1.27*  --      No results for input(s): BC, LABGRAM, CULTRESP, BFCX in the last 72 hours.   Radiograph: Xr Elbow Right (2 fracture with additional fracture line at base of greater trochanter. 2. Arthritic changes of the bilateral hips and spine. 4. Old fractures suspected of the sacrum and bilateral inferior pelvis. Signed by Dr Lilian Christiansen on 8/17/2019 7:41 AM    Xr Chest Portable    Result Date: 8/17/2019  XR CHEST PORTABLE 8/17/2019 5:15 AM HISTORY: Right proximal femur fracture COMPARISON: 5/26/2019 CXR: A single frontal view of the chest is performed. Findings: The lung are hyperinflated. There is resolution of a previous small left pleural effusion. There is decreasing size right pleural effusion. There is mild cardiomegaly. There is a 4.4 cm rounded opacity of the right suprahilar region with a prominence of the right hilum. There are slightly increasing peripheral right upper lobe opacities with underlying right apical pleural thickening. Findings may be inflammatory, however a neoplastic process must also be considered. Follow-up CT chest with IV contrast recommended. There is diffuse thoracic aortic calcification. There is a rotoscoliosis of the thoracolumbar spine. 1. 4.4 cm rounded right suprahilar opacity is worrisome for potential neoplasm. Inflammatory process also considered. Peripheral right upper lobe opacities may represent postobstructive pneumonitis. Questionable right hilar adenopathy. Contrast enhanced CT chest would be most helpful for reassessment. . Signed by Dr Lilian Christiansen on 8/17/2019 7:46 AM    Xr Hip 2-3 Vw W Pelvis Right    Result Date: 8/17/2019  History: Fall with pain Right hip: Frontal view the pelvis is obtained with frontal and crosstable lateral views the right hip. There is a nondisplaced right intertrochanteric fracture identified with disruption of the cortex of the greater trochanter. There are old fractures of the right pubis. There is moderate to advanced narrowing of the right hip joint space. There are vascular calcifications.     1. Nondisplaced right intertrochanteric femur Substantive portion:  Pt is s/p femoral nail, did well. Pt did not remember what kind of surgery she had today, hx dementia. No additional pulmonary complaints at time of rounds. Exam showed no distress. Chest diminished without wheeze or rhonchi,  Recommend continue postop care. masslike process in RUL is of concern, although pt has deferred evaluation of this, and given her age and comorbidities she is not a candidate for invasive pulmonary workup. I have seen and examined patient personally, performing a face-to-face diagnostic evaluation with plan of care reviewed and developed with APRN and nursing staff. I have addended and/or modified the above history of present illness, physical examination, and assessment and plan to reflect my findings and impressions. Essential elements of the care plan were discussed with APRN above. Agree with findings and assessment/plan as documented above.     Electronically signed by Dunia Webb on 8/18/2019, 9:03 PM

## 2019-08-19 ENCOUNTER — OUTSIDE FACILITY SERVICE (OUTPATIENT)
Dept: PULMONOLOGY | Facility: CLINIC | Age: 84
End: 2019-08-19

## 2019-08-19 PROBLEM — Z51.5 PALLIATIVE CARE PATIENT: Status: ACTIVE | Noted: 2019-08-19

## 2019-08-19 LAB
ANION GAP SERPL CALCULATED.3IONS-SCNC: 9 MMOL/L (ref 7–19)
BUN BLDV-MCNC: 19 MG/DL (ref 8–23)
CALCIUM SERPL-MCNC: 8.2 MG/DL (ref 8.8–10.2)
CHLORIDE BLD-SCNC: 106 MMOL/L (ref 98–111)
CO2: 26 MMOL/L (ref 22–29)
CREAT SERPL-MCNC: 0.6 MG/DL (ref 0.5–0.9)
EKG P AXIS: 65 DEGREES
EKG P-R INTERVAL: 208 MS
EKG Q-T INTERVAL: 430 MS
EKG QRS DURATION: 128 MS
EKG QTC CALCULATION (BAZETT): 454 MS
EKG T AXIS: 69 DEGREES
GFR NON-AFRICAN AMERICAN: >60
GLUCOSE BLD-MCNC: 96 MG/DL (ref 74–109)
POTASSIUM SERPL-SCNC: 3.1 MMOL/L (ref 3.5–5)
SODIUM BLD-SCNC: 141 MMOL/L (ref 136–145)

## 2019-08-19 PROCEDURE — 80048 BASIC METABOLIC PNL TOTAL CA: CPT

## 2019-08-19 PROCEDURE — 2500000003 HC RX 250 WO HCPCS: Performed by: ORTHOPAEDIC SURGERY

## 2019-08-19 PROCEDURE — 94640 AIRWAY INHALATION TREATMENT: CPT

## 2019-08-19 PROCEDURE — 97530 THERAPEUTIC ACTIVITIES: CPT

## 2019-08-19 PROCEDURE — 6360000002 HC RX W HCPCS: Performed by: ORTHOPAEDIC SURGERY

## 2019-08-19 PROCEDURE — 99497 ADVNCD CARE PLAN 30 MIN: CPT | Performed by: INTERNAL MEDICINE

## 2019-08-19 PROCEDURE — 6370000000 HC RX 637 (ALT 250 FOR IP): Performed by: FAMILY MEDICINE

## 2019-08-19 PROCEDURE — 36415 COLL VENOUS BLD VENIPUNCTURE: CPT

## 2019-08-19 PROCEDURE — 99232 SBSQ HOSP IP/OBS MODERATE 35: CPT | Performed by: INTERNAL MEDICINE

## 2019-08-19 PROCEDURE — 2580000003 HC RX 258: Performed by: FAMILY MEDICINE

## 2019-08-19 PROCEDURE — 6370000000 HC RX 637 (ALT 250 FOR IP): Performed by: ORTHOPAEDIC SURGERY

## 2019-08-19 PROCEDURE — 97162 PT EVAL MOD COMPLEX 30 MIN: CPT

## 2019-08-19 PROCEDURE — 2580000003 HC RX 258: Performed by: ORTHOPAEDIC SURGERY

## 2019-08-19 PROCEDURE — 6360000002 HC RX W HCPCS: Performed by: FAMILY MEDICINE

## 2019-08-19 PROCEDURE — 93970 EXTREMITY STUDY: CPT

## 2019-08-19 PROCEDURE — 1210000000 HC MED SURG R&B

## 2019-08-19 PROCEDURE — 2700000000 HC OXYGEN THERAPY PER DAY

## 2019-08-19 PROCEDURE — 99222 1ST HOSP IP/OBS MODERATE 55: CPT | Performed by: NURSE PRACTITIONER

## 2019-08-19 RX ORDER — POTASSIUM CHLORIDE 20 MEQ/1
20 TABLET, EXTENDED RELEASE ORAL 2 TIMES DAILY
Status: DISPENSED | OUTPATIENT
Start: 2019-08-19 | End: 2019-08-20

## 2019-08-19 RX ADMIN — PREGABALIN 75 MG: 75 CAPSULE ORAL at 22:16

## 2019-08-19 RX ADMIN — DOCUSATE SODIUM 100 MG: 100 CAPSULE, LIQUID FILLED ORAL at 09:37

## 2019-08-19 RX ADMIN — LEVOTHYROXINE SODIUM 100 MCG: 0.1 TABLET ORAL at 05:58

## 2019-08-19 RX ADMIN — OXYCODONE HYDROCHLORIDE 5 MG: 5 TABLET ORAL at 15:23

## 2019-08-19 RX ADMIN — ACETAMINOPHEN 650 MG: 325 TABLET ORAL at 14:44

## 2019-08-19 RX ADMIN — ENOXAPARIN SODIUM 40 MG: 40 INJECTION SUBCUTANEOUS at 02:01

## 2019-08-19 RX ADMIN — PANTOPRAZOLE SODIUM 40 MG: 40 TABLET, DELAYED RELEASE ORAL at 05:59

## 2019-08-19 RX ADMIN — GUAIFENESIN 600 MG: 600 TABLET, EXTENDED RELEASE ORAL at 09:37

## 2019-08-19 RX ADMIN — ASPIRIN 325 MG: 325 TABLET, DELAYED RELEASE ORAL at 22:18

## 2019-08-19 RX ADMIN — Medication 10 ML: at 22:17

## 2019-08-19 RX ADMIN — DOXYCYCLINE 100 MG: 100 INJECTION, POWDER, LYOPHILIZED, FOR SOLUTION INTRAVENOUS at 05:58

## 2019-08-19 RX ADMIN — MICONAZOLE NITRATE: 20 POWDER TOPICAL at 10:07

## 2019-08-19 RX ADMIN — DOXYCYCLINE 100 MG: 100 INJECTION, POWDER, LYOPHILIZED, FOR SOLUTION INTRAVENOUS at 22:38

## 2019-08-19 RX ADMIN — GUAIFENESIN 600 MG: 600 TABLET, EXTENDED RELEASE ORAL at 22:16

## 2019-08-19 RX ADMIN — Medication 10 ML: at 09:40

## 2019-08-19 RX ADMIN — IPRATROPIUM BROMIDE 0.5 MG: 0.5 SOLUTION RESPIRATORY (INHALATION) at 07:35

## 2019-08-19 RX ADMIN — POTASSIUM CHLORIDE 20 MEQ: 20 TABLET, EXTENDED RELEASE ORAL at 22:17

## 2019-08-19 RX ADMIN — MICONAZOLE NITRATE: 20 POWDER TOPICAL at 22:20

## 2019-08-19 RX ADMIN — ACETAMINOPHEN 650 MG: 325 TABLET ORAL at 06:09

## 2019-08-19 RX ADMIN — DOCUSATE SODIUM 100 MG: 100 CAPSULE, LIQUID FILLED ORAL at 22:16

## 2019-08-19 RX ADMIN — ENOXAPARIN SODIUM 40 MG: 40 INJECTION SUBCUTANEOUS at 13:46

## 2019-08-19 RX ADMIN — CLOPIDOGREL BISULFATE 75 MG: 75 TABLET ORAL at 09:37

## 2019-08-19 RX ADMIN — IPRATROPIUM BROMIDE 0.5 MG: 0.5 SOLUTION RESPIRATORY (INHALATION) at 15:55

## 2019-08-19 RX ADMIN — SODIUM CHLORIDE: 9 INJECTION, SOLUTION INTRAVENOUS at 09:49

## 2019-08-19 RX ADMIN — ATORVASTATIN CALCIUM 10 MG: 10 TABLET, FILM COATED ORAL at 09:37

## 2019-08-19 RX ADMIN — Medication 2 G: at 05:58

## 2019-08-19 RX ADMIN — IPRATROPIUM BROMIDE 0.5 MG: 0.5 SOLUTION RESPIRATORY (INHALATION) at 19:41

## 2019-08-19 RX ADMIN — Medication 2 G: at 22:39

## 2019-08-19 RX ADMIN — POTASSIUM CHLORIDE 20 MEQ: 20 TABLET, EXTENDED RELEASE ORAL at 13:46

## 2019-08-19 RX ADMIN — IPRATROPIUM BROMIDE 0.5 MG: 0.5 SOLUTION RESPIRATORY (INHALATION) at 11:40

## 2019-08-19 RX ADMIN — DONEPEZIL HYDROCHLORIDE 10 MG: 10 TABLET, ORALLY DISINTEGRATING ORAL at 22:16

## 2019-08-19 RX ADMIN — VITAMIN C 1 TABLET: TAB at 09:36

## 2019-08-19 RX ADMIN — POTASSIUM CHLORIDE 20 MEQ: 20 TABLET, EXTENDED RELEASE ORAL at 09:37

## 2019-08-19 RX ADMIN — ASPIRIN 325 MG: 325 TABLET, DELAYED RELEASE ORAL at 09:35

## 2019-08-19 ASSESSMENT — ENCOUNTER SYMPTOMS
VOMITING: 0
SHORTNESS OF BREATH: 0
WHEEZING: 0
EYE ITCHING: 0
GASTROINTESTINAL NEGATIVE: 1
EYES NEGATIVE: 1
ROS SKIN COMMENTS: SURGICAL
SORE THROAT: 0
EYE DISCHARGE: 0
COUGH: 1
DIARRHEA: 0

## 2019-08-19 ASSESSMENT — PAIN SCALES - GENERAL
PAINLEVEL_OUTOF10: 5
PAINLEVEL_OUTOF10: 6
PAINLEVEL_OUTOF10: 10

## 2019-08-19 ASSESSMENT — PAIN DESCRIPTION - ORIENTATION: ORIENTATION: RIGHT

## 2019-08-19 ASSESSMENT — PAIN DESCRIPTION - PAIN TYPE: TYPE: SURGICAL PAIN

## 2019-08-19 ASSESSMENT — PAIN DESCRIPTION - DESCRIPTORS: DESCRIPTORS: ACHING

## 2019-08-19 ASSESSMENT — PAIN SCALES - WONG BAKER: WONGBAKER_NUMERICALRESPONSE: 6

## 2019-08-19 ASSESSMENT — PAIN DESCRIPTION - LOCATION: LOCATION: HIP

## 2019-08-19 NOTE — CONSULTS
injection 40 mg  1 mg/kg Subcutaneous Q12H Alpesh Winters MD   40 mg at 08/19/19 0201    fentaNYL (SUBLIMAZE) injection 25 mcg  25 mcg Intravenous Q1H PRN Tico Aldridge MD        Or    fentaNYL (SUBLIMAZE) injection 50 mcg  50 mcg Intravenous Q1H PRN Tico Aldridge MD        [facility]ron [facility] injection 4 mg  4 mg Intravenous Q8H PRN Tico Aldridge MD        atorvastatin (LIPITOR) tablet 10 mg  10 mg Oral Daily Tico Aldridge MD   10 mg at 08/19/19 7073    vitamin B and C (TOTAL B-C) 1 tablet  1 tablet Oral Daily Tico Aldridge MD   1 tablet at 08/19/19 0936    clopidogrel (PLAVIX) tablet 75 mg  75 mg Oral Daily Tico Aldridge MD   75 mg at 08/19/19 4205    donepezil (ARICEPT ODT) disintegrating tablet 10 mg  10 mg Oral Nightly Tico Aldridge MD   10 mg at 08/18/19 2043    pantoprazole (PROTONIX) tablet 40 mg  40 mg Oral QAM AC Tico Aldridge MD   40 mg at 08/19/19 0559    furosemide (LASIX) tablet 40 mg  40 mg Oral Daily Alpesh Winters MD        levothyroxine (SYNTHROID) tablet 100 mcg  100 mcg Oral Daily Tico Aldridge MD   100 mcg at 08/19/19 0558    metoprolol tartrate (LOPRESSOR) tablet 25 mg  25 mg Oral BID Alpesh Winters MD   25 mg at 08/17/19 2224    nitroGLYCERIN (NITROSTAT) SL tablet 0.4 mg  0.4 mg Sublingual Q5 Min PRN Tico Aldridge MD        miconazole (MICOTIN) 2 % powder   Topical BID Tico Aldridge MD        potassium chloride (KLOR-CON M) extended release tablet 20 mEq  20 mEq Oral Daily Tico Aldridge MD   20 mEq at 08/19/19 3668    pregabalin (LYRICA) capsule 75 mg  75 mg Oral Nightly Tico Aldridge MD   75 mg at 08/18/19 2043    0.9 % sodium chloride infusion   Intravenous Continuous Alpesh Winters MD 50 mL/hr at 08/19/19 0949      ipratropium (ATROVENT) 0.02 % nebulizer solution 0.5 mg  0.5 mg Nebulization 4x daily Tico Aldridge MD   0.5 mg at 08/19/19 0735    albuterol (PROVENTIL) nebulizer solution 2.5 mg  2.5 mg Nebulization Q6H PRN Tico Aldridge MD        guaiFENesin [facility]) extended release tablet 600 mg  600 mg Oral BID Daniel Garza MD   600 mg at 08/19/19 8910    metoprolol (LOPRESSOR) injection 2.5 mg  2.5 mg Intravenous Q6H PRN Daniel Garza MD        ceFEPIme (MAXIPIME) 2 g in sodium chloride (PF) 20 mL IV syringe  2 g Intravenous Q12H Daniel Garza MD   2 g at 08/19/19 0558    doxycycline (VIBRAMYCIN) 100 mg in dextrose 5 % 100 mL IVPB  100 mg Intravenous Q12H Daniel Garza MD   Stopped at 08/19/19 8213    sodium chloride flush 0.9 % injection 10 mL  10 mL Intravenous 2 times per day Daniel Garza MD   10 mL at 08/19/19 0940    sodium chloride flush 0.9 % injection 10 mL  10 mL Intravenous PRN Daniel Garza MD        docusate sodium (COLACE) capsule 100 mg  100 mg Oral BID Daniel Garza MD   100 mg at 08/19/19 3543    oxyCODONE (ROXICODONE) immediate release tablet 5 mg  5 mg Oral Q4H PRN Daniel Garza MD        Or   [facility] oxyCODONE (ROXICODONE) immediate release tablet 10 mg  10 mg Oral Q4H PRN Daniel Garza MD        acetaminophen (TYLENOL) tablet 650 mg  650 mg Oral Q4H PRN Daniel Garza MD   650 mg at 08/19/19 6506    polyethylene glycol (GLYCOLAX) packet 17 g  17 g Oral Daily PRN Daniel Garza MD        bisacodyl (DULCOLAX) suppository 10 mg  10 mg Rectal Daily PRN Daniel Garza MD        aspirin EC tablet 325 mg  325 mg Oral BID Daniel Garza MD   325 mg at 08/19/19 0935        Labs:     Recent Labs     08/17/19  0548 08/18/19  0251   WBC 11.9* 10.7   RBC 3.66* 3.22*   HGB 10.2* 8.7*   HCT 33.4* 29.9*   MCV 91.3 92.9   MCH 27.9 27.0   MCHC 30.5* 29.1*    271     Recent Labs     08/17/19  0548 08/18/19  0251 08/19/19  0615    139 141   K 3.5 3.5 3.1*   ANIONGAP 13 15 9    104 106   CO2 25 20* 26   BUN 10 10 19   CREATININE 0.6 0.5 0.6   GLUCOSE 129* 127* 96   CALCIUM 8.8 7.9* 8.2*     No results for input(s): MG, PHOS in the last 72 hours.   Recent Labs     08/17/19  0548   AST 12   ALT <5*   BILITOT 0.3   ALKPHOS 100     ABGs:No results for input(s): PHART, [id number],

## 2019-08-19 NOTE — PROGRESS NOTES
Progress Note  8/19/2019 6:37 AM  Subjective:   Admit Date: 8/17/2019  PCP: Christopher Woodard MD    Interval History: A little better     Dietary Nutrition Supplements: Standard High Calorie Oral Supplement  DIET CARDIAC;     Intake/Output Summary (Last 24 hours) at 8/19/2019 0637  Last data filed at 8/19/2019 0617  Gross per 24 hour   Intake 577.26 ml   Output 2575 ml   Net -1997.74 ml     Medications:      sodium chloride 50 mL/hr at 08/18/19 1010      enoxaparin  1 mg/kg Subcutaneous Q12H    atorvastatin  10 mg Oral Daily    vitamin B and C  1 tablet Oral Daily    clopidogrel  75 mg Oral Daily    donepezil  10 mg Oral Nightly    pantoprazole  40 mg Oral QAM AC    furosemide  40 mg Oral Daily    levothyroxine  100 mcg Oral Daily    metoprolol tartrate  25 mg Oral BID    miconazole   Topical BID    potassium chloride  20 mEq Oral Daily    pregabalin  75 mg Oral Nightly    ipratropium  0.5 mg Nebulization 4x daily    guaiFENesin  600 mg Oral BID    cefepime  2 g Intravenous Q12H    doxycycline (VIBRAMYCIN) IV  100 mg Intravenous Q12H    sodium chloride flush  10 mL Intravenous 2 times per day    docusate sodium  100 mg Oral BID    aspirin  325 mg Oral BID     Recent Labs     08/17/19  0548 08/18/19  0251   WBC 11.9* 10.7   HGB 10.2* 8.7*    271     Recent Labs     08/17/19  0548 08/18/19  0251    139   K 3.5 3.5    104   CO2 25 20*   BUN 10 10   CREATININE 0.6 0.5   GLUCOSE 129* 127*     Recent Labs     08/17/19  0548   AST 12   ALT <5*   BILITOT 0.3   ALKPHOS 100       Objective:   Vitals: BP (!) 104/57   Pulse 73   Temp 97.5 °F (36.4 °C) (Temporal)   Resp 16   Ht 5' 4\" (1.626 m)   Wt 95 lb (43.1 kg)   SpO2 93%   BMI 16.31 kg/m²   General appearance: alert and cooperative with exam  Lungs: Bilateral wheezes and rhonci  Heart: regular rate and rhythm, S1, S2 normal, no murmur, click, rub or gallop  Abdomen: soft, non-tender; bowel sounds normal; no masses,  no

## 2019-08-19 NOTE — PROGRESS NOTES
diffuse vascular calcification. There is ectasia of the distal aorta to maximum measurement of 3.2 cm. 1. Right intertrochanteric femoral fracture with additional fracture line at base of greater trochanter. 2. Arthritic changes of the bilateral hips and spine. 4. Old fractures suspected of the sacrum and bilateral inferior pelvis. Signed by Dr Rose Harding on 8/17/2019 7:41 AM    Xr Chest Portable    Result Date: 8/17/2019  XR CHEST PORTABLE 8/17/2019 5:15 AM HISTORY: Right proximal femur fracture COMPARISON: 5/26/2019 CXR: A single frontal view of the chest is performed. Findings: The lung are hyperinflated. There is resolution of a previous small left pleural effusion. There is decreasing size right pleural effusion. There is mild cardiomegaly. There is a 4.4 cm rounded opacity of the right suprahilar region with a prominence of the right hilum. There are slightly increasing peripheral right upper lobe opacities with underlying right apical pleural thickening. Findings may be inflammatory, however a neoplastic process must also be considered. Follow-up CT chest with IV contrast recommended. There is diffuse thoracic aortic calcification. There is a rotoscoliosis of the thoracolumbar spine. 1. 4.4 cm rounded right suprahilar opacity is worrisome for potential neoplasm. Inflammatory process also considered. Peripheral right upper lobe opacities may represent postobstructive pneumonitis. Questionable right hilar adenopathy. Contrast enhanced CT chest would be most helpful for reassessment. . Signed by Dr Rose Harding on 8/17/2019 7:46 AM    Xr Hip 2-3 Vw W Pelvis Right    Result Date: 8/17/2019  History: Fall with pain Right hip: Frontal view the pelvis is obtained with frontal and crosstable lateral views the right hip. There is a nondisplaced right intertrochanteric fracture identified with disruption of the cortex of the greater trochanter. There are old fractures of the right pubis.  There is moderate examination, and assessment and plan to reflect my findings and impressions. Essential elements of the care plan were discussed with APRN above. Agree with findings and assessment/plan as documented above.     Electronically signed by Mason Macias MD on 8/19/19 at 9:52 AM

## 2019-08-19 NOTE — PROGRESS NOTES
Physical Therapy    Facility/Department: [facility] SURG SERVICES  Initial Assessment    NAME: Adrien Tucker  : 1935  MRN: [id number]    Date of Service: 2019    Discharge Recommendations:  Continue to assess pending progress        Assessment   Body structures, Functions, Activity limitations: Decreased functional mobility ; Decreased ROM; Decreased strength;Decreased safe awareness;Decreased balance; Increased Pain  Assessment: TRANSFERRED TO [facility] THEN RECLINER MAX-DEPENDENT. PT Education: Goals;PT Role;Plan of Care;General Safety;Weight-bearing Education;Transfer Training;Precautions  REQUIRES PT FOLLOW UP: Yes  Activity Tolerance  Activity Tolerance: Patient Tolerated treatment well       Patient Diagnosis(es): The primary encounter diagnosis was Closed fracture of trochanter of right femur, initial encounter ([facility] [address] A diagnosis of Lung infiltrate was also pertinent to this visit. has a past medical history of AAA (abdominal aortic aneurysm) without rupture ([address] Arteriosclerotic heart disease, Arthritis, Atherosclerosis of native arteries of the extremities with intermittent claudication, Atherosclerosis of native arteries of the extremities with intermittent claudication, CAD (coronary artery disease), Carotid artery occlusion, Cerebrovascular disease, Heart attack ([address] History of blood transfusion, History of cardiac murmur, Hyperlipidemia, Hypertension, Hypothyroidism, Multiple sclerosis ([address] Pneumonia, Seizure disorder ([address] Thyroid disease, and Tobacco abuse, continuous. has a past surgical history that includes Thyroid surgery; Cardiac valuve replacement (T[facility]/09); Abdominal aortic aneurysm repair (T[facility]/09); Diagnostic Cardiac Cath Lab Procedure (03); Diagnostic Cardiac Cath Lab Procedure (12); Foot surgery (Right); Cardiac catheterization (2002); vascular surgery (2011 R); Cardiac catheterization (6/17/15  [facility]);  Colonoscopy; Endoscopy, colon, diagnostic; eye surgery (Bilateral); fracture surgery (Right); Appendectomy; Ovarian cyst surgery; Cardiac catheterization (12/11/2015); Carotid endarterectomy (Right, 1/11/2016); vascular surgery (12/11/2015 TJR); vascular surgery (1/11/2015 TJR); vascular surgery (06/15/2019); and Femur fracture surgery (Right, 8/17/2019). Restrictions  Restrictions/Precautions  Restrictions/Precautions: Weight Bearing  Lower Extremity Weight Bearing Restrictions  Right Lower Extremity Weight Bearing: Toe Touch Weight Bearing  Vision/Hearing        Subjective  General  Patient assessed for rehabilitation services?: Yes  Diagnosis: R FEMUR NAIL  Subjective  Subjective: Pt WILLING TO PARTICIPATE  Pain Screening  Patient Currently in Pain: Yes          Orientation     Social/Functional History  Social/Functional History  Type of Home: Facility(Was at [facility] prior to admit)  ADL Assistance: Independent  Ambulation Assistance: Independent  Transfer Assistance: Independent  Additional Comments: Questionable historian on PLOF.   States Independent with walking,dressing, toileting  Cognition        Objective          PROM RLE (degrees)  RLE General PROM: DEC OVERALL, PAINFUL  Strength RLE  Comment: GROSSLY 2/5  Strength LLE  Comment: GROSSLY 3/5        Bed mobility  Supine to Sit: Maximum assistance  Scooting: Maximal assistance;Dependent/Total  Transfers  Sit to Stand: Maximum Assistance  Bed to Chair: Maximum assistance;Dependent/Total  Stand Pivot Transfers: Maximum Assistance;Dependent/Total  Comment: MONITORED TTWB RLE, Pt UNABLE TO COMPLY FULLY WITHOUT ASSIST  Ambulation  Ambulation?: No     Balance  Sitting - Dynamic: Fair  Standing - Dynamic: Poor        Plan   Plan  Times per week: AT LEAST 6-7  Current Treatment Recommendations: Strengthening, ROM, Balance Training, Transfer Training, Functional Mobility Training, Patient/Caregiver Education & Training, Safety Education & Training  Safety Devices  Type of devices: Left in chair, Call light within reach    G-Code       OutComes Score                                                  AM-PAC Score             Goals  Short term goals  Time Frame for Short term goals: 14 DAYS  Short term goal 1: BED MOB MIN ASSIST  Short term goal 2: SIT TO STAND MIN ASSIST  Short term goal 3: BED TO CHAIR MIN-MOD       Therapy Time   Individual Concurrent Group Co-treatment   Time In           Time Out           Minutes                   Juliana Ochoa, PT

## 2019-08-19 NOTE — PROGRESS NOTES
Orthopedic Surgery Progress Note    Jacob Feliz  8/19/2019      Subjective:     Systemic or Specific Complaints: No Complaints    Pain none    Objective:     Patient Vitals for the past 24 hrs:   BP Temp Temp src Pulse Resp SpO2   08/19/19 0157 (!) 104/57 97.5 °F (36.4 °C) Temporal 73 16 93 %   08/18/19 2229 (!) 107/52 98.8 °F (37.1 °C) Temporal 76 18 92 %   08/18/19 1835 136/64 98 °F (36.7 °C) Temporal 83 20 95 %   08/18/19 1514 (!) 104/50 98.3 °F (36.8 °C) -- 67 18 99 %   08/18/19 1007 (!) 103/52 96.5 °F (35.8 °C) -- 51 16 93 %   08/18/19 0949 (!) 102/40 97.5 °F (36.4 °C) -- 56 16 (!) 88 %   08/18/19 0654 (!) 109/56 97 °F (36.1 °C) -- 53 18 94 %       right lower  General: alert, appears stated age and cooperative   Wound: clean, dry, intact             Dressing: clean, dry, and intact   Extremity: Distal NVI           DVT Exam: No evidence of DVT seen on physical exam.                   Data Review:  Recent Labs     08/17/19  0548 08/18/19  0251   HGB 10.2* 8.7*     Recent Labs     08/18/19  0251      K 3.5   CREATININE 0.5       Assessment:     POD# 2 s/p right hip trochanteric entry femoral nailing for nondisplaced intertrochanteric right femur fracture    Plan:      1:  DVT prophylaxis, ICE, elevate  2:  Pain control  3:  Physical therapy/Occupational therapy  4:  Anticipate discharge today if pain well controlled  5:  Toe touch weight bearing LLE       Electronically signed by Edmund Rodriguez MD on 8/19/2019 at 6:36 AM

## 2019-08-20 ENCOUNTER — OUTSIDE FACILITY SERVICE (OUTPATIENT)
Dept: PULMONOLOGY | Facility: CLINIC | Age: 84
End: 2019-08-20

## 2019-08-20 LAB
ANION GAP SERPL CALCULATED.3IONS-SCNC: 12 MMOL/L (ref 7–19)
BASOPHILS ABSOLUTE: 0 K/UL (ref 0–0.2)
BASOPHILS RELATIVE PERCENT: 0.3 % (ref 0–1)
BUN BLDV-MCNC: 13 MG/DL (ref 8–23)
CALCIUM SERPL-MCNC: 8.2 MG/DL (ref 8.8–10.2)
CHLORIDE BLD-SCNC: 104 MMOL/L (ref 98–111)
CO2: 23 MMOL/L (ref 22–29)
CREAT SERPL-MCNC: 0.5 MG/DL (ref 0.5–0.9)
EOSINOPHILS ABSOLUTE: 0.1 K/UL (ref 0–0.6)
EOSINOPHILS RELATIVE PERCENT: 0.8 % (ref 0–5)
GFR NON-AFRICAN AMERICAN: >60
GLUCOSE BLD-MCNC: 104 MG/DL (ref 74–109)
HCT VFR BLD CALC: 26.1 % (ref 37–47)
HEMOGLOBIN: 7.9 G/DL (ref 12–16)
IMMATURE GRANULOCYTES #: 0.3 K/UL
LYMPHOCYTES ABSOLUTE: 1.2 K/UL (ref 1.1–4.5)
LYMPHOCYTES RELATIVE PERCENT: 9 % (ref 20–40)
MCH RBC QN AUTO: 27.4 PG (ref 27–31)
MCHC RBC AUTO-ENTMCNC: 30.3 G/DL (ref 33–37)
MCV RBC AUTO: 90.6 FL (ref 81–99)
MONOCYTES ABSOLUTE: 1.2 K/UL (ref 0–0.9)
MONOCYTES RELATIVE PERCENT: 9.6 % (ref 0–10)
NEUTROPHILS ABSOLUTE: 10 K/UL (ref 1.5–7.5)
NEUTROPHILS RELATIVE PERCENT: 77.7 % (ref 50–65)
PDW BLD-RTO: 15.9 % (ref 11.5–14.5)
PLATELET # BLD: 262 K/UL (ref 130–400)
PMV BLD AUTO: 10.7 FL (ref 9.4–12.3)
POTASSIUM SERPL-SCNC: 3.7 MMOL/L (ref 3.5–5)
RBC # BLD: 2.88 M/UL (ref 4.2–5.4)
SODIUM BLD-SCNC: 139 MMOL/L (ref 136–145)
WBC # BLD: 12.8 K/UL (ref 4.8–10.8)

## 2019-08-20 PROCEDURE — 94640 AIRWAY INHALATION TREATMENT: CPT

## 2019-08-20 PROCEDURE — 2500000003 HC RX 250 WO HCPCS: Performed by: ORTHOPAEDIC SURGERY

## 2019-08-20 PROCEDURE — 6360000002 HC RX W HCPCS: Performed by: ORTHOPAEDIC SURGERY

## 2019-08-20 PROCEDURE — 80048 BASIC METABOLIC PNL TOTAL CA: CPT

## 2019-08-20 PROCEDURE — 6370000000 HC RX 637 (ALT 250 FOR IP): Performed by: ORTHOPAEDIC SURGERY

## 2019-08-20 PROCEDURE — 97530 THERAPEUTIC ACTIVITIES: CPT

## 2019-08-20 PROCEDURE — 6370000000 HC RX 637 (ALT 250 FOR IP): Performed by: FAMILY MEDICINE

## 2019-08-20 PROCEDURE — 36415 COLL VENOUS BLD VENIPUNCTURE: CPT

## 2019-08-20 PROCEDURE — 85025 COMPLETE CBC W/AUTO DIFF WBC: CPT

## 2019-08-20 PROCEDURE — 2700000000 HC OXYGEN THERAPY PER DAY

## 2019-08-20 PROCEDURE — 99232 SBSQ HOSP IP/OBS MODERATE 35: CPT | Performed by: INTERNAL MEDICINE

## 2019-08-20 PROCEDURE — 1210000000 HC MED SURG R&B

## 2019-08-20 PROCEDURE — 2580000003 HC RX 258: Performed by: ORTHOPAEDIC SURGERY

## 2019-08-20 PROCEDURE — 6360000002 HC RX W HCPCS: Performed by: FAMILY MEDICINE

## 2019-08-20 RX ADMIN — METOPROLOL TARTRATE 25 MG: 25 TABLET ORAL at 11:28

## 2019-08-20 RX ADMIN — APIXABAN 10 MG: 5 TABLET, FILM COATED ORAL at 14:46

## 2019-08-20 RX ADMIN — METOPROLOL TARTRATE 25 MG: 25 TABLET ORAL at 20:47

## 2019-08-20 RX ADMIN — OXYCODONE HYDROCHLORIDE 5 MG: 5 TABLET ORAL at 14:46

## 2019-08-20 RX ADMIN — ACETAMINOPHEN 650 MG: 325 TABLET ORAL at 14:46

## 2019-08-20 RX ADMIN — ENOXAPARIN SODIUM 40 MG: 40 INJECTION SUBCUTANEOUS at 02:30

## 2019-08-20 RX ADMIN — VITAMIN C 1 TABLET: TAB at 11:28

## 2019-08-20 RX ADMIN — POTASSIUM CHLORIDE 20 MEQ: 20 TABLET, EXTENDED RELEASE ORAL at 11:28

## 2019-08-20 RX ADMIN — LEVOTHYROXINE SODIUM 100 MCG: 0.1 TABLET ORAL at 06:22

## 2019-08-20 RX ADMIN — ACETAMINOPHEN 650 MG: 325 TABLET ORAL at 06:22

## 2019-08-20 RX ADMIN — MICONAZOLE NITRATE: 20 POWDER TOPICAL at 11:29

## 2019-08-20 RX ADMIN — IPRATROPIUM BROMIDE 0.5 MG: 0.5 SOLUTION RESPIRATORY (INHALATION) at 16:14

## 2019-08-20 RX ADMIN — IPRATROPIUM BROMIDE 0.5 MG: 0.5 SOLUTION RESPIRATORY (INHALATION) at 07:23

## 2019-08-20 RX ADMIN — DOCUSATE SODIUM 100 MG: 100 CAPSULE, LIQUID FILLED ORAL at 20:48

## 2019-08-20 RX ADMIN — Medication 2 G: at 19:27

## 2019-08-20 RX ADMIN — PANTOPRAZOLE SODIUM 40 MG: 40 TABLET, DELAYED RELEASE ORAL at 06:22

## 2019-08-20 RX ADMIN — GUAIFENESIN 600 MG: 600 TABLET, EXTENDED RELEASE ORAL at 11:29

## 2019-08-20 RX ADMIN — DOCUSATE SODIUM 100 MG: 100 CAPSULE, LIQUID FILLED ORAL at 11:28

## 2019-08-20 RX ADMIN — DONEPEZIL HYDROCHLORIDE 10 MG: 10 TABLET, ORALLY DISINTEGRATING ORAL at 20:47

## 2019-08-20 RX ADMIN — MICONAZOLE NITRATE: 20 POWDER TOPICAL at 20:48

## 2019-08-20 RX ADMIN — IPRATROPIUM BROMIDE 0.5 MG: 0.5 SOLUTION RESPIRATORY (INHALATION) at 19:18

## 2019-08-20 RX ADMIN — CLOPIDOGREL BISULFATE 75 MG: 75 TABLET ORAL at 11:28

## 2019-08-20 RX ADMIN — ATORVASTATIN CALCIUM 10 MG: 10 TABLET, FILM COATED ORAL at 11:29

## 2019-08-20 RX ADMIN — DOXYCYCLINE 100 MG: 100 INJECTION, POWDER, LYOPHILIZED, FOR SOLUTION INTRAVENOUS at 06:21

## 2019-08-20 RX ADMIN — Medication 2 G: at 04:24

## 2019-08-20 RX ADMIN — DOXYCYCLINE 100 MG: 100 INJECTION, POWDER, LYOPHILIZED, FOR SOLUTION INTRAVENOUS at 19:33

## 2019-08-20 RX ADMIN — GUAIFENESIN 600 MG: 600 TABLET, EXTENDED RELEASE ORAL at 20:47

## 2019-08-20 RX ADMIN — APIXABAN 10 MG: 5 TABLET, FILM COATED ORAL at 20:47

## 2019-08-20 RX ADMIN — PREGABALIN 75 MG: 75 CAPSULE ORAL at 20:48

## 2019-08-20 RX ADMIN — IPRATROPIUM BROMIDE 0.5 MG: 0.5 SOLUTION RESPIRATORY (INHALATION) at 11:33

## 2019-08-20 ASSESSMENT — ENCOUNTER SYMPTOMS
SHORTNESS OF BREATH: 0
EYE DISCHARGE: 0
SORE THROAT: 0
GASTROINTESTINAL NEGATIVE: 1
EYES NEGATIVE: 1
EYE ITCHING: 0
COUGH: 1
DIARRHEA: 0
VOMITING: 0

## 2019-08-20 ASSESSMENT — PAIN SCALES - GENERAL
PAINLEVEL_OUTOF10: 4
PAINLEVEL_OUTOF10: 9

## 2019-08-20 NOTE — PROGRESS NOTES
Physical Therapy  Name: Cass Baig  MRN:  [id number]  Date of service:  8/20/2019 08/20/19 1446   Subjective   Subjective Patient agreeable to work with therapy   Pain Assessment   Pain Level 9   Bed Mobility   Supine to Sit Maximal assistance   Scooting Maximal assistance;Dependent/Total   Transfers   Sit to Stand Maximum Assistance   Stand to sit Maximum Assistance   Bed to Chair Maximum assistance   Stand Pivot Transfers Maximum Assistance   Comment MONITORED TTWB RLE, patient did better today    Ambulation   Ambulation? No   Other Activities   Comment Assisted to/from [facility] [address] term goals   Time Frame for Short term goals 14 DAYS   Short term goal 1 BED MOB MIN ASSIST   Short term goal 2 SIT TO STAND MIN ASSIST   Short term goal 3 BED TO CHAIR MIN-MOD   Conditions Requiring Skilled Therapeutic Intervention   Body structures, Functions, Activity limitations Decreased functional mobility ; Decreased ROM; Decreased strength;Decreased safe awareness;Decreased balance; Increased Pain   Assessment Assisted patient to recliner and left with all needs in reach.         Electronically signed by Jodi Oneill PTA on 8/20/2019 at 5:05 PM

## 2019-08-20 NOTE — PROGRESS NOTES
Pulmonary and Critical Care Progress Note  [facility] Dianne Vaca    MR# [id number]    Acct# [de-identified]  8/20/2019   12:26 PM    Referring Curt Kim MD  Chief Complaint: Lung mass and COPD     Interval HX: The patient remained stable from a pulmonary standpoint. Medications    apixaban, 10 mg, Oral, BID    potassium chloride, 20 mEq, Oral, BID    atorvastatin, 10 mg, Oral, Daily    vitamin B and C, 1 tablet, Oral, Daily    clopidogrel, 75 mg, Oral, Daily    donepezil, 10 mg, Oral, Nightly    pantoprazole, 40 mg, Oral, QAM AC    furosemide, 40 mg, Oral, Daily    levothyroxine, 100 mcg, Oral, Daily    metoprolol tartrate, 25 mg, Oral, BID    miconazole, , Topical, BID    potassium chloride, 20 mEq, Oral, Daily    pregabalin, 75 mg, Oral, Nightly    ipratropium, 0.5 mg, Nebulization, 4x daily    guaiFENesin, 600 mg, Oral, BID    cefepime, 2 g, Intravenous, Q12H    doxycycline (VIBRAMYCIN) IV, 100 mg, Intravenous, Q12H    sodium chloride flush, 10 mL, Intravenous, 2 times per day    docusate sodium, 100 mg, Oral, BID     Review of Systems:  Review of Systems   Constitutional: Negative for chills and fever. HENT: Negative. Negative for congestion and sore throat. Eyes: Negative. Negative for discharge and itching. Respiratory: Positive for cough (non productive for last few weeks). Negative for shortness of breath. Cardiovascular: Negative. Negative for chest pain, palpitations and leg swelling. Gastrointestinal: Negative. Negative for diarrhea and vomiting. Musculoskeletal:        Right hip pain    Skin: Negative. Negative for rash and wound. Neurological:        Hx of dementia on aricept    Psychiatric/Behavioral: Negative. Negative for agitation and behavioral problems. Physical Exam:   height is 5' 4\" (1.626 m) and weight is 95 lb (43.1 kg). Her temporal temperature is 98.6 °F (37 °C).  Her blood pressure is 118/48 (abnormal) and her future.     Electronically signed by Alida Hairston on 08/20/19 at 3:38 PM

## 2019-08-21 ENCOUNTER — OUTSIDE FACILITY SERVICE (OUTPATIENT)
Dept: PULMONOLOGY | Facility: CLINIC | Age: 84
End: 2019-08-21

## 2019-08-21 ENCOUNTER — APPOINTMENT (OUTPATIENT)
Dept: GENERAL RADIOLOGY | Age: 84
DRG: 480 | End: 2019-08-21
Payer: MEDICARE

## 2019-08-21 LAB
ANION GAP SERPL CALCULATED.3IONS-SCNC: 10 MMOL/L (ref 7–19)
ANION GAP SERPL CALCULATED.3IONS-SCNC: 12 MMOL/L (ref 7–19)
BASOPHILS ABSOLUTE: 0 K/UL (ref 0–0.2)
BASOPHILS RELATIVE PERCENT: 0.3 % (ref 0–1)
BUN BLDV-MCNC: 13 MG/DL (ref 8–23)
BUN BLDV-MCNC: 15 MG/DL (ref 8–23)
CALCIUM SERPL-MCNC: 8.5 MG/DL (ref 8.8–10.2)
CALCIUM SERPL-MCNC: 8.6 MG/DL (ref 8.8–10.2)
CHLORIDE BLD-SCNC: 105 MMOL/L (ref 98–111)
CHLORIDE BLD-SCNC: 108 MMOL/L (ref 98–111)
CO2: 21 MMOL/L (ref 22–29)
CO2: 22 MMOL/L (ref 22–29)
CREAT SERPL-MCNC: 0.5 MG/DL (ref 0.5–0.9)
CREAT SERPL-MCNC: <0.5 MG/DL (ref 0.5–0.9)
EOSINOPHILS ABSOLUTE: 0.1 K/UL (ref 0–0.6)
EOSINOPHILS RELATIVE PERCENT: 1 % (ref 0–5)
GFR NON-AFRICAN AMERICAN: >60
GFR NON-AFRICAN AMERICAN: >60
GLUCOSE BLD-MCNC: 106 MG/DL (ref 74–109)
GLUCOSE BLD-MCNC: 182 MG/DL (ref 74–109)
HCT VFR BLD CALC: 25.2 % (ref 37–47)
HCT VFR BLD CALC: 25.4 % (ref 37–47)
HCT VFR BLD CALC: 25.6 % (ref 37–47)
HEMOGLOBIN: 7.5 G/DL (ref 12–16)
HEMOGLOBIN: 7.8 G/DL (ref 12–16)
HEMOGLOBIN: 7.8 G/DL (ref 12–16)
IMMATURE GRANULOCYTES #: 0.4 K/UL
LYMPHOCYTES ABSOLUTE: 0.9 K/UL (ref 1.1–4.5)
LYMPHOCYTES RELATIVE PERCENT: 7.5 % (ref 20–40)
MCH RBC QN AUTO: 27.5 PG (ref 27–31)
MCHC RBC AUTO-ENTMCNC: 30.5 G/DL (ref 33–37)
MCV RBC AUTO: 90.1 FL (ref 81–99)
MONOCYTES ABSOLUTE: 1.2 K/UL (ref 0–0.9)
MONOCYTES RELATIVE PERCENT: 9.5 % (ref 0–10)
NEUTROPHILS ABSOLUTE: 9.9 K/UL (ref 1.5–7.5)
NEUTROPHILS RELATIVE PERCENT: 78.6 % (ref 50–65)
PDW BLD-RTO: 15.9 % (ref 11.5–14.5)
PLATELET # BLD: 264 K/UL (ref 130–400)
PMV BLD AUTO: 10.6 FL (ref 9.4–12.3)
POTASSIUM SERPL-SCNC: 3.9 MMOL/L (ref 3.5–5)
POTASSIUM SERPL-SCNC: 4.1 MMOL/L (ref 3.5–5)
RBC # BLD: 2.84 M/UL (ref 4.2–5.4)
SODIUM BLD-SCNC: 138 MMOL/L (ref 136–145)
SODIUM BLD-SCNC: 140 MMOL/L (ref 136–145)
WBC # BLD: 12.6 K/UL (ref 4.8–10.8)

## 2019-08-21 PROCEDURE — 97530 THERAPEUTIC ACTIVITIES: CPT

## 2019-08-21 PROCEDURE — 6360000002 HC RX W HCPCS: Performed by: ORTHOPAEDIC SURGERY

## 2019-08-21 PROCEDURE — 99233 SBSQ HOSP IP/OBS HIGH 50: CPT | Performed by: INTERNAL MEDICINE

## 2019-08-21 PROCEDURE — 2500000003 HC RX 250 WO HCPCS: Performed by: ORTHOPAEDIC SURGERY

## 2019-08-21 PROCEDURE — 2580000003 HC RX 258: Performed by: ORTHOPAEDIC SURGERY

## 2019-08-21 PROCEDURE — 94760 N-INVAS EAR/PLS OXIMETRY 1: CPT

## 2019-08-21 PROCEDURE — 85014 HEMATOCRIT: CPT

## 2019-08-21 PROCEDURE — 94640 AIRWAY INHALATION TREATMENT: CPT

## 2019-08-21 PROCEDURE — 2700000000 HC OXYGEN THERAPY PER DAY

## 2019-08-21 PROCEDURE — 97535 SELF CARE MNGMENT TRAINING: CPT

## 2019-08-21 PROCEDURE — 6370000000 HC RX 637 (ALT 250 FOR IP): Performed by: ORTHOPAEDIC SURGERY

## 2019-08-21 PROCEDURE — 36415 COLL VENOUS BLD VENIPUNCTURE: CPT

## 2019-08-21 PROCEDURE — 85025 COMPLETE CBC W/AUTO DIFF WBC: CPT

## 2019-08-21 PROCEDURE — 71045 X-RAY EXAM CHEST 1 VIEW: CPT

## 2019-08-21 PROCEDURE — 6360000002 HC RX W HCPCS: Performed by: INTERNAL MEDICINE

## 2019-08-21 PROCEDURE — 1210000000 HC MED SURG R&B

## 2019-08-21 PROCEDURE — 80048 BASIC METABOLIC PNL TOTAL CA: CPT

## 2019-08-21 PROCEDURE — 6370000000 HC RX 637 (ALT 250 FOR IP): Performed by: FAMILY MEDICINE

## 2019-08-21 PROCEDURE — 85018 HEMOGLOBIN: CPT

## 2019-08-21 RX ORDER — PREDNISONE 1 MG/1
10 TABLET ORAL DAILY
Status: DISCONTINUED | OUTPATIENT
Start: 2019-08-21 | End: 2019-08-26 | Stop reason: HOSPADM

## 2019-08-21 RX ORDER — CEFDINIR 300 MG/1
300 CAPSULE ORAL EVERY 12 HOURS SCHEDULED
Status: DISCONTINUED | OUTPATIENT
Start: 2019-08-21 | End: 2019-08-23

## 2019-08-21 RX ORDER — BENZONATATE 100 MG/1
100 CAPSULE ORAL EVERY 8 HOURS PRN
Status: DISCONTINUED | OUTPATIENT
Start: 2019-08-21 | End: 2019-08-26 | Stop reason: HOSPADM

## 2019-08-21 RX ORDER — LORAZEPAM 2 MG/ML
1 INJECTION INTRAMUSCULAR EVERY 6 HOURS
Status: DISCONTINUED | OUTPATIENT
Start: 2019-08-21 | End: 2019-08-22

## 2019-08-21 RX ADMIN — GUAIFENESIN 600 MG: 600 TABLET, EXTENDED RELEASE ORAL at 10:48

## 2019-08-21 RX ADMIN — LORAZEPAM 1 MG: 2 INJECTION INTRAMUSCULAR; INTRAVENOUS at 23:08

## 2019-08-21 RX ADMIN — DONEPEZIL HYDROCHLORIDE 10 MG: 10 TABLET, ORALLY DISINTEGRATING ORAL at 20:30

## 2019-08-21 RX ADMIN — DOCUSATE SODIUM 100 MG: 100 CAPSULE, LIQUID FILLED ORAL at 20:30

## 2019-08-21 RX ADMIN — PREGABALIN 75 MG: 75 CAPSULE ORAL at 20:30

## 2019-08-21 RX ADMIN — IPRATROPIUM BROMIDE 0.5 MG: 0.5 SOLUTION RESPIRATORY (INHALATION) at 11:15

## 2019-08-21 RX ADMIN — IPRATROPIUM BROMIDE 0.5 MG: 0.5 SOLUTION RESPIRATORY (INHALATION) at 07:12

## 2019-08-21 RX ADMIN — IPRATROPIUM BROMIDE 0.5 MG: 0.5 SOLUTION RESPIRATORY (INHALATION) at 19:44

## 2019-08-21 RX ADMIN — APIXABAN 10 MG: 5 TABLET, FILM COATED ORAL at 10:48

## 2019-08-21 RX ADMIN — GUAIFENESIN 600 MG: 600 TABLET, EXTENDED RELEASE ORAL at 20:30

## 2019-08-21 RX ADMIN — PANTOPRAZOLE SODIUM 40 MG: 40 TABLET, DELAYED RELEASE ORAL at 06:22

## 2019-08-21 RX ADMIN — Medication 10 ML: at 20:38

## 2019-08-21 RX ADMIN — ATORVASTATIN CALCIUM 10 MG: 10 TABLET, FILM COATED ORAL at 10:48

## 2019-08-21 RX ADMIN — APIXABAN 10 MG: 5 TABLET, FILM COATED ORAL at 20:35

## 2019-08-21 RX ADMIN — Medication 2 G: at 04:27

## 2019-08-21 RX ADMIN — OXYCODONE HYDROCHLORIDE 5 MG: 5 TABLET ORAL at 10:47

## 2019-08-21 RX ADMIN — VITAMIN C 1 TABLET: TAB at 10:47

## 2019-08-21 RX ADMIN — CEFDINIR 300 MG: 300 CAPSULE ORAL at 20:30

## 2019-08-21 RX ADMIN — CEFDINIR 300 MG: 300 CAPSULE ORAL at 10:47

## 2019-08-21 RX ADMIN — LEVOTHYROXINE SODIUM 100 MCG: 0.1 TABLET ORAL at 06:22

## 2019-08-21 RX ADMIN — POTASSIUM CHLORIDE 20 MEQ: 20 TABLET, EXTENDED RELEASE ORAL at 10:47

## 2019-08-21 RX ADMIN — PREDNISONE 10 MG: 5 TABLET ORAL at 10:47

## 2019-08-21 RX ADMIN — DOXYCYCLINE 100 MG: 100 INJECTION, POWDER, LYOPHILIZED, FOR SOLUTION INTRAVENOUS at 04:27

## 2019-08-21 RX ADMIN — METOPROLOL TARTRATE 12.5 MG: 25 TABLET ORAL at 20:30

## 2019-08-21 RX ADMIN — IPRATROPIUM BROMIDE 0.5 MG: 0.5 SOLUTION RESPIRATORY (INHALATION) at 15:21

## 2019-08-21 RX ADMIN — CLOPIDOGREL BISULFATE 75 MG: 75 TABLET ORAL at 10:48

## 2019-08-21 ASSESSMENT — PAIN DESCRIPTION - PROGRESSION: CLINICAL_PROGRESSION: GRADUALLY IMPROVING

## 2019-08-21 ASSESSMENT — PAIN DESCRIPTION - DESCRIPTORS
DESCRIPTORS: TENDER
DESCRIPTORS: ACHING

## 2019-08-21 ASSESSMENT — PAIN DESCRIPTION - ORIENTATION
ORIENTATION: RIGHT
ORIENTATION: RIGHT

## 2019-08-21 ASSESSMENT — PAIN DESCRIPTION - LOCATION
LOCATION: HIP
LOCATION: HIP

## 2019-08-21 ASSESSMENT — PAIN SCALES - WONG BAKER: WONGBAKER_NUMERICALRESPONSE: 2

## 2019-08-21 ASSESSMENT — PAIN SCALES - GENERAL
PAINLEVEL_OUTOF10: 3
PAINLEVEL_OUTOF10: 6
PAINLEVEL_OUTOF10: 6

## 2019-08-21 ASSESSMENT — PAIN - FUNCTIONAL ASSESSMENT: PAIN_FUNCTIONAL_ASSESSMENT: PREVENTS OR INTERFERES WITH MANY ACTIVE NOT PASSIVE ACTIVITIES

## 2019-08-21 ASSESSMENT — PAIN DESCRIPTION - ONSET: ONSET: ON-GOING

## 2019-08-21 ASSESSMENT — PAIN DESCRIPTION - PAIN TYPE
TYPE: SURGICAL PAIN;ACUTE PAIN
TYPE: SURGICAL PAIN

## 2019-08-21 ASSESSMENT — PAIN DESCRIPTION - FREQUENCY: FREQUENCY: INTERMITTENT

## 2019-08-21 NOTE — PROGRESS NOTES
Progress Note  8/21/2019 6:41 AM  Subjective:   Admit Date: 8/17/2019  PCP: Christopher Woodard MD    Interval History: A little better, but she is not eating or drinking well. Dietary Nutrition Supplements: Standard High Calorie Oral Supplement  DIET CARDIAC; Intake/Output Summary (Last 24 hours) at 8/21/2019 0641  Last data filed at 8/21/2019 0630  Gross per 24 hour   Intake 433 ml   Output 450 ml   Net -17 ml     Medications:      sodium chloride 50 mL/hr at 08/19/19 0949      apixaban  10 mg Oral BID    atorvastatin  10 mg Oral Daily    vitamin B and C  1 tablet Oral Daily    clopidogrel  75 mg Oral Daily    donepezil  10 mg Oral Nightly    pantoprazole  40 mg Oral QAM AC    furosemide  40 mg Oral Daily    levothyroxine  100 mcg Oral Daily    metoprolol tartrate  25 mg Oral BID    miconazole   Topical BID    potassium chloride  20 mEq Oral Daily    pregabalin  75 mg Oral Nightly    ipratropium  0.5 mg Nebulization 4x daily    guaiFENesin  600 mg Oral BID    cefepime  2 g Intravenous Q12H    doxycycline (VIBRAMYCIN) IV  100 mg Intravenous Q12H    sodium chloride flush  10 mL Intravenous 2 times per day    docusate sodium  100 mg Oral BID     Recent Labs     08/20/19  0647 08/21/19  0508   WBC 12.8* 12.6*   HGB 7.9* 7.8*    264     Recent Labs     08/19/19  0615 08/20/19  0647 08/21/19  0508    139 140   K 3.1* 3.7 4.1    104 108   CO2 26 23 22   BUN 19 13 15   CREATININE 0.6 0.5 0.5   GLUCOSE 96 104 106     No results for input(s): AST, ALT, ALB, BILITOT, ALKPHOS in the last 72 hours.     Objective:   Vitals: /61   Pulse 68   Temp 99.5 °F (37.5 °C) (Temporal)   Resp 20   Ht 5' 4\" (1.626 m)   Wt 95 lb (43.1 kg)   SpO2 93%   BMI 16.31 kg/m²   General appearance: alert and cooperative with exam  Lungs: Bilateral wheezes and rhonci  Heart: regular rate and rhythm, S1, S2 normal, no murmur, click, rub or gallop  Abdomen: soft, non-tender; bowel sounds normal; no

## 2019-08-21 NOTE — PROGRESS NOTES
Appendectomy; Ovarian cyst surgery; Cardiac catheterization (12/11/2015); Carotid endarterectomy (Right, 1/11/2016); vascular surgery (12/11/2015 [facility]); vascular surgery (1/11/2015 T[facility]); vascular surgery (06/15/2019); and Femur fracture surgery (Right, 8/17/2019). Restrictions  Restrictions/Precautions  Restrictions/Precautions: Weight Bearing  Lower Extremity Weight Bearing Restrictions  Right Lower Extremity Weight Bearing: Toe Touch Weight Bearing  Subjective   General  Chart Reviewed: Yes  Patient assessed for rehabilitation services?: Yes  Response to previous treatment: Patient with no complaints from previous session  Family / Caregiver Present: No  Diagnosis: R femur fx. Pain Assessment  Pain Assessment: 0-10  Pain Level: 6  Pain Type: Surgical pain  Pain Location: Hip  Pain Orientation: Right  Pain Descriptors: Aching  Non-Pharmaceutical Pain Intervention(s): Cold applied  Vital Signs  Patient Currently in Pain: Yes   Orientation     Objective    ADL  Toileting: Maximum assistance        Balance  Sitting Balance: Supervision  Toilet Transfers  Toilet - Technique: Stand pivot  Equipment Used: Standard bedside commode  Toilet Transfer: Moderate assistance;Maximum assistance;2 Person assistance  Bed mobility  Supine to Sit: Moderate assistance  Sit to Supine: Moderate assistance  Scooting: Maximal assistance;2 Person assistance  Transfers  Stand Pivot Transfers: 2 Person assistance; Moderate assistance  Sit to stand: Moderate assistance;2 Person assistance  Stand to sit: Moderate assistance                                                                 Plan   Plan  Times per week: 3-5x/week  Times per day: Daily  G-Code     OutComes Score                                                  AM-PAC Score             Goals  Short term goals  Time Frame for Short term goals: 1 week  Short term goal 1: CGA with toilet tfers  Short term goal 2: CGA with clothing mgmt.  in standing  Short term goal 3: Sherry with

## 2019-08-21 NOTE — PROGRESS NOTES
Cardiovascular: Negative. Negative for chest pain, palpitations and leg swelling. Gastrointestinal: Negative. Negative for diarrhea and vomiting. Musculoskeletal:        Right hip pain    Skin: Negative. Negative for rash and wound. Neurological:        Hx of dementia on aricept    Psychiatric/Behavioral: Negative. Negative for agitation and behavioral problems.      Physical Exam:  Blood pressure 109/64, pulse 89, temperature 97.8 °F (36.6 °C), temperature source Temporal, resp. rate 20, height 5' 4\" (1.626 m), weight 95 lb (43.1 kg), SpO2 90 %. Intake/Output Summary (Last 24 hours) at 8/21/2019 1837  Last data filed at 8/21/2019 1501  Gross per 24 hour   Intake 561 ml   Output 450 ml   Net 111 ml     Physical Exam Constitutional: She is oriented to person, place, and time. She appears well-developed and well-nourished. No distress. HENT:   Head: Normocephalic and atraumatic. Eyes: Pupils are equal, round, and reactive to light. Right eye exhibits no discharge. Left eye exhibits no discharge. Neck: Normal range of motion. Neck supple. Cardiovascular: Normal rate, regular rhythm and normal heart sounds. Exam reveals no gallop and no friction rub. No murmur heard. Pulmonary/Chest: Effort normal. She has a  few minimal rhonchi. Abdominal: Soft. Bowel sounds are normal.   Musculoskeletal: She exhibits tenderness (right hip). She exhibits no edema. Neurological: She is alert and oriented to person, place, and time. Skin: Skin is warm and dry. Psychiatric: She has a normal mood and affect.  Her behavior is normal. Judgment and thought content normal.      Recent Labs     08/20/19  0647 08/21/19  0508 08/21/19  1519   WBC 12.8* 12.6*  --    RBC 2.88* 2.84*  --    HGB 7.9* 7.8* 7.8*   HCT 26.1* 25.6* 25.2*    264  --    MCV 90.6 90.1  --    MCH 27.4 27.5  --    MCHC 30.3* 30.5*  --    RDW 15.9* 15.9*  --       Recent Labs     08/19/19  0615 08/20/19  0647 08/21/19  0508    139 possible.     Electronically signed by Erum Hernandez on 8/21/2019 at 6:37 PM

## 2019-08-21 NOTE — PROGRESS NOTES
Noted slight changes in vital signs. Patient is coughing roughly every 5-10 minutes and has used roughly 15-20 tissues of bloody drainage in a two hour span. Dr. Coral Ambrose was able to provide some information to family and anticoagulates have been adjusted. Call placed to Dr. Justin Cooney to make him aware of the frequency and increased heart rate/respiration rates. Pending call back. Electronically signed by Prakash Peralta RN on 8/21/2019 at 6:51 PM       Returned phone call regarding concerns. Vital signs provided. Orders placed.  Electronically signed by Prakash Peralta RN on 8/21/2019 at 7:03 PM

## 2019-08-22 ENCOUNTER — OUTSIDE FACILITY SERVICE (OUTPATIENT)
Dept: PULMONOLOGY | Facility: CLINIC | Age: 84
End: 2019-08-22

## 2019-08-22 LAB
ABO/RH: NORMAL
ANION GAP SERPL CALCULATED.3IONS-SCNC: 11 MMOL/L (ref 7–19)
ANTIBODY SCREEN: NORMAL
BASOPHILS ABSOLUTE: 0 K/UL (ref 0–0.2)
BASOPHILS RELATIVE PERCENT: 0.2 % (ref 0–1)
BLOOD BANK DISPENSE STATUS: NORMAL
BLOOD BANK PRODUCT CODE: NORMAL
BPU ID: NORMAL
BUN BLDV-MCNC: 15 MG/DL (ref 8–23)
CALCIUM SERPL-MCNC: 8.1 MG/DL (ref 8.8–10.2)
CHLORIDE BLD-SCNC: 109 MMOL/L (ref 98–111)
CO2: 22 MMOL/L (ref 22–29)
CREAT SERPL-MCNC: <0.5 MG/DL (ref 0.5–0.9)
DESCRIPTION BLOOD BANK: NORMAL
EOSINOPHILS ABSOLUTE: 0 K/UL (ref 0–0.6)
EOSINOPHILS RELATIVE PERCENT: 0 % (ref 0–5)
GFR NON-AFRICAN AMERICAN: >60
GLUCOSE BLD-MCNC: 89 MG/DL (ref 74–109)
HCT VFR BLD CALC: 22 % (ref 37–47)
HCT VFR BLD CALC: 28.5 % (ref 37–47)
HEMOGLOBIN: 6.9 G/DL (ref 12–16)
HEMOGLOBIN: 9 G/DL (ref 12–16)
IMMATURE GRANULOCYTES #: 0.4 K/UL
LYMPHOCYTES ABSOLUTE: 0.9 K/UL (ref 1.1–4.5)
LYMPHOCYTES RELATIVE PERCENT: 5.8 % (ref 20–40)
MCH RBC QN AUTO: 27.8 PG (ref 27–31)
MCH RBC QN AUTO: 28.5 PG (ref 27–31)
MCHC RBC AUTO-ENTMCNC: 31.4 G/DL (ref 33–37)
MCHC RBC AUTO-ENTMCNC: 31.6 G/DL (ref 33–37)
MCV RBC AUTO: 88.7 FL (ref 81–99)
MCV RBC AUTO: 90.2 FL (ref 81–99)
MONOCYTES ABSOLUTE: 1.2 K/UL (ref 0–0.9)
MONOCYTES RELATIVE PERCENT: 7.7 % (ref 0–10)
NEUTROPHILS ABSOLUTE: 13.1 K/UL (ref 1.5–7.5)
NEUTROPHILS RELATIVE PERCENT: 84 % (ref 50–65)
PDW BLD-RTO: 15.3 % (ref 11.5–14.5)
PDW BLD-RTO: 15.9 % (ref 11.5–14.5)
PLATELET # BLD: 245 K/UL (ref 130–400)
PLATELET # BLD: 261 K/UL (ref 130–400)
PMV BLD AUTO: 10.6 FL (ref 9.4–12.3)
PMV BLD AUTO: 10.9 FL (ref 9.4–12.3)
POTASSIUM SERPL-SCNC: 4.1 MMOL/L (ref 3.5–5)
PRO-BNP: ABNORMAL PG/ML (ref 0–1800)
RBC # BLD: 2.48 M/UL (ref 4.2–5.4)
RBC # BLD: 3.16 M/UL (ref 4.2–5.4)
SODIUM BLD-SCNC: 142 MMOL/L (ref 136–145)
WBC # BLD: 12.3 K/UL (ref 4.8–10.8)
WBC # BLD: 15.6 K/UL (ref 4.8–10.8)

## 2019-08-22 PROCEDURE — 86923 COMPATIBILITY TEST ELECTRIC: CPT

## 2019-08-22 PROCEDURE — 6370000000 HC RX 637 (ALT 250 FOR IP): Performed by: ORTHOPAEDIC SURGERY

## 2019-08-22 PROCEDURE — 86901 BLOOD TYPING SEROLOGIC RH(D): CPT

## 2019-08-22 PROCEDURE — 2700000000 HC OXYGEN THERAPY PER DAY

## 2019-08-22 PROCEDURE — 2580000003 HC RX 258: Performed by: FAMILY MEDICINE

## 2019-08-22 PROCEDURE — 6360000002 HC RX W HCPCS: Performed by: ORTHOPAEDIC SURGERY

## 2019-08-22 PROCEDURE — 1210000000 HC MED SURG R&B

## 2019-08-22 PROCEDURE — 99233 SBSQ HOSP IP/OBS HIGH 50: CPT | Performed by: INTERNAL MEDICINE

## 2019-08-22 PROCEDURE — P9016 RBC LEUKOCYTES REDUCED: HCPCS

## 2019-08-22 PROCEDURE — 85025 COMPLETE CBC W/AUTO DIFF WBC: CPT

## 2019-08-22 PROCEDURE — 36430 TRANSFUSION BLD/BLD COMPNT: CPT

## 2019-08-22 PROCEDURE — 86900 BLOOD TYPING SEROLOGIC ABO: CPT

## 2019-08-22 PROCEDURE — 85014 HEMATOCRIT: CPT

## 2019-08-22 PROCEDURE — 85018 HEMOGLOBIN: CPT

## 2019-08-22 PROCEDURE — 94640 AIRWAY INHALATION TREATMENT: CPT

## 2019-08-22 PROCEDURE — 86850 RBC ANTIBODY SCREEN: CPT

## 2019-08-22 PROCEDURE — 85027 COMPLETE CBC AUTOMATED: CPT

## 2019-08-22 PROCEDURE — 99233 SBSQ HOSP IP/OBS HIGH 50: CPT | Performed by: NURSE PRACTITIONER

## 2019-08-22 PROCEDURE — 80048 BASIC METABOLIC PNL TOTAL CA: CPT

## 2019-08-22 PROCEDURE — 6370000000 HC RX 637 (ALT 250 FOR IP): Performed by: FAMILY MEDICINE

## 2019-08-22 PROCEDURE — 83880 ASSAY OF NATRIURETIC PEPTIDE: CPT

## 2019-08-22 PROCEDURE — 36415 COLL VENOUS BLD VENIPUNCTURE: CPT

## 2019-08-22 RX ORDER — 0.9 % SODIUM CHLORIDE 0.9 %
250 INTRAVENOUS SOLUTION INTRAVENOUS ONCE
Status: COMPLETED | OUTPATIENT
Start: 2019-08-22 | End: 2019-08-22

## 2019-08-22 RX ORDER — LORAZEPAM 2 MG/ML
1 INJECTION INTRAMUSCULAR EVERY 6 HOURS PRN
Status: DISCONTINUED | OUTPATIENT
Start: 2019-08-22 | End: 2019-08-26 | Stop reason: HOSPADM

## 2019-08-22 RX ADMIN — VITAMIN C 1 TABLET: TAB at 10:35

## 2019-08-22 RX ADMIN — POTASSIUM CHLORIDE 20 MEQ: 20 TABLET, EXTENDED RELEASE ORAL at 10:35

## 2019-08-22 RX ADMIN — FENTANYL CITRATE 25 MCG: 50 INJECTION, SOLUTION INTRAMUSCULAR; INTRAVENOUS at 00:35

## 2019-08-22 RX ADMIN — SODIUM CHLORIDE 250 ML: 9 INJECTION, SOLUTION INTRAVENOUS at 10:33

## 2019-08-22 RX ADMIN — APIXABAN 10 MG: 5 TABLET, FILM COATED ORAL at 20:55

## 2019-08-22 RX ADMIN — OXYCODONE HYDROCHLORIDE 5 MG: 5 TABLET ORAL at 10:34

## 2019-08-22 RX ADMIN — MICONAZOLE NITRATE: 20 POWDER TOPICAL at 10:37

## 2019-08-22 RX ADMIN — FUROSEMIDE 40 MG: 40 TABLET ORAL at 16:59

## 2019-08-22 RX ADMIN — LEVOTHYROXINE SODIUM 100 MCG: 0.1 TABLET ORAL at 10:34

## 2019-08-22 RX ADMIN — PREDNISONE 10 MG: 5 TABLET ORAL at 10:34

## 2019-08-22 RX ADMIN — MICONAZOLE NITRATE: 20 POWDER TOPICAL at 21:14

## 2019-08-22 RX ADMIN — IPRATROPIUM BROMIDE 0.5 MG: 0.5 SOLUTION RESPIRATORY (INHALATION) at 19:32

## 2019-08-22 RX ADMIN — IPRATROPIUM BROMIDE 0.5 MG: 0.5 SOLUTION RESPIRATORY (INHALATION) at 07:34

## 2019-08-22 RX ADMIN — DOCUSATE SODIUM 100 MG: 100 CAPSULE, LIQUID FILLED ORAL at 20:55

## 2019-08-22 RX ADMIN — APIXABAN 10 MG: 5 TABLET, FILM COATED ORAL at 10:35

## 2019-08-22 RX ADMIN — CEFDINIR 300 MG: 300 CAPSULE ORAL at 20:55

## 2019-08-22 RX ADMIN — DOCUSATE SODIUM 100 MG: 100 CAPSULE, LIQUID FILLED ORAL at 10:35

## 2019-08-22 RX ADMIN — PREGABALIN 75 MG: 75 CAPSULE ORAL at 20:55

## 2019-08-22 RX ADMIN — DONEPEZIL HYDROCHLORIDE 10 MG: 10 TABLET, ORALLY DISINTEGRATING ORAL at 20:55

## 2019-08-22 RX ADMIN — IPRATROPIUM BROMIDE 0.5 MG: 0.5 SOLUTION RESPIRATORY (INHALATION) at 11:10

## 2019-08-22 RX ADMIN — ACETAMINOPHEN 650 MG: 325 TABLET ORAL at 22:24

## 2019-08-22 RX ADMIN — GUAIFENESIN 600 MG: 600 TABLET, EXTENDED RELEASE ORAL at 10:35

## 2019-08-22 RX ADMIN — PANTOPRAZOLE SODIUM 40 MG: 40 TABLET, DELAYED RELEASE ORAL at 10:34

## 2019-08-22 RX ADMIN — ATORVASTATIN CALCIUM 10 MG: 10 TABLET, FILM COATED ORAL at 10:34

## 2019-08-22 RX ADMIN — GUAIFENESIN 600 MG: 600 TABLET, EXTENDED RELEASE ORAL at 20:55

## 2019-08-22 RX ADMIN — CEFDINIR 300 MG: 300 CAPSULE ORAL at 10:35

## 2019-08-22 ASSESSMENT — ENCOUNTER SYMPTOMS
COUGH: 1
GASTROINTESTINAL NEGATIVE: 1
SHORTNESS OF BREATH: 1

## 2019-08-22 ASSESSMENT — PAIN SCALES - GENERAL
PAINLEVEL_OUTOF10: 6
PAINLEVEL_OUTOF10: 3
PAINLEVEL_OUTOF10: 7

## 2019-08-22 NOTE — PROGRESS NOTES
Asked to spot check spo2 for possible v-mask. Found pt with spo2 of 78% on 3lpm nasal cannula. Increased to 50% v-mask. spo2 now 90%.

## 2019-08-22 NOTE — PROGRESS NOTES
Pulmonary and Critical Care Progress Note [facility]    Patient: Sky Page  1935   MR# [id number]   Acct# [de-identified]  08/22/19   10:50 AM  Referring Provider: Kranthi Ledesma MD    Chief Complaint: Hemoptysis and hypoxemia. Interval history: The patient had problems last evening early this morning with increasing hemoptysis some associated respiratory distress and increased oxygen requirements. Currently she is on O2 via mask is not having active hemoptysis. Her chest x-ray shows her right upper lobe density which may very well represent a malignant process and possibly mild pulmonary vascular congestion. Her BNP is markedly elevated at over 17,000. Medications: sodium chloride, 250 mL, Intravenous, Once    predniSONE, 10 mg, Oral, Daily    cefdinir, 300 mg, Oral, 2 times per day    metoprolol tartrate, 12.5 mg, Oral, BID    apixaban, 10 mg, Oral, BID    atorvastatin, 10 mg, Oral, Daily    vitamin B and C, 1 tablet, Oral, Daily    donepezil, 10 mg, Oral, Nightly    pantoprazole, 40 mg, Oral, QAM AC    furosemide, 40 mg, Oral, Daily    levothyroxine, 100 mcg, Oral, Daily    miconazole, , Topical, BID    potassium chloride, 20 mEq, Oral, Daily    pregabalin, 75 mg, Oral, Nightly    ipratropium, 0.5 mg, Nebulization, 4x daily    guaiFENesin, 600 mg, Oral, BID    sodium chloride flush, 10 mL, Intravenous, 2 times per day    docusate sodium, 100 mg, Oral, BID   Review of Systems: Review of Systems Review of Systems   Constitutional: Negative for chills and fever. HENT: Negative. Negative for congestion and sore throat. Eyes: Negative. Negative for discharge and itching. Respiratory: Positive for cough with hemoptysis and also positive for dyspnea. Cardiovascular: Negative. Negative for chest pain, palpitations and leg swelling. Gastrointestinal: Negative. Negative for diarrhea and vomiting. Musculoskeletal:        Right hip pain    Skin: Negative.   Negative

## 2019-08-23 ENCOUNTER — OUTSIDE FACILITY SERVICE (OUTPATIENT)
Dept: PULMONOLOGY | Facility: CLINIC | Age: 84
End: 2019-08-23

## 2019-08-23 LAB
ANION GAP SERPL CALCULATED.3IONS-SCNC: 14 MMOL/L (ref 7–19)
BASOPHILS ABSOLUTE: 0 K/UL (ref 0–0.2)
BASOPHILS RELATIVE PERCENT: 0.2 % (ref 0–1)
BUN BLDV-MCNC: 16 MG/DL (ref 8–23)
CALCIUM SERPL-MCNC: 8.1 MG/DL (ref 8.8–10.2)
CHLORIDE BLD-SCNC: 107 MMOL/L (ref 98–111)
CO2: 20 MMOL/L (ref 22–29)
CREAT SERPL-MCNC: <0.5 MG/DL (ref 0.5–0.9)
EOSINOPHILS ABSOLUTE: 0.1 K/UL (ref 0–0.6)
EOSINOPHILS RELATIVE PERCENT: 0.4 % (ref 0–5)
GFR NON-AFRICAN AMERICAN: >60
GLUCOSE BLD-MCNC: 99 MG/DL (ref 74–109)
HCT VFR BLD CALC: 27.7 % (ref 37–47)
HEMOGLOBIN: 8.5 G/DL (ref 12–16)
IMMATURE GRANULOCYTES #: 0.5 K/UL
LYMPHOCYTES ABSOLUTE: 1.2 K/UL (ref 1.1–4.5)
LYMPHOCYTES RELATIVE PERCENT: 8.6 % (ref 20–40)
MCH RBC QN AUTO: 28.1 PG (ref 27–31)
MCHC RBC AUTO-ENTMCNC: 30.7 G/DL (ref 33–37)
MCV RBC AUTO: 91.4 FL (ref 81–99)
MONOCYTES ABSOLUTE: 1.7 K/UL (ref 0–0.9)
MONOCYTES RELATIVE PERCENT: 11.7 % (ref 0–10)
NEUTROPHILS ABSOLUTE: 10.8 K/UL (ref 1.5–7.5)
NEUTROPHILS RELATIVE PERCENT: 75.7 % (ref 50–65)
PDW BLD-RTO: 15.9 % (ref 11.5–14.5)
PLATELET # BLD: 268 K/UL (ref 130–400)
PMV BLD AUTO: 10.6 FL (ref 9.4–12.3)
POTASSIUM SERPL-SCNC: 3.3 MMOL/L (ref 3.5–5)
RBC # BLD: 3.03 M/UL (ref 4.2–5.4)
SODIUM BLD-SCNC: 141 MMOL/L (ref 136–145)
WBC # BLD: 14.2 K/UL (ref 4.8–10.8)

## 2019-08-23 PROCEDURE — 80048 BASIC METABOLIC PNL TOTAL CA: CPT

## 2019-08-23 PROCEDURE — 6360000002 HC RX W HCPCS: Performed by: ORTHOPAEDIC SURGERY

## 2019-08-23 PROCEDURE — 36415 COLL VENOUS BLD VENIPUNCTURE: CPT

## 2019-08-23 PROCEDURE — 1210000000 HC MED SURG R&B

## 2019-08-23 PROCEDURE — 6370000000 HC RX 637 (ALT 250 FOR IP): Performed by: ORTHOPAEDIC SURGERY

## 2019-08-23 PROCEDURE — 2580000003 HC RX 258: Performed by: ORTHOPAEDIC SURGERY

## 2019-08-23 PROCEDURE — 6360000002 HC RX W HCPCS: Performed by: FAMILY MEDICINE

## 2019-08-23 PROCEDURE — 94640 AIRWAY INHALATION TREATMENT: CPT

## 2019-08-23 PROCEDURE — 2700000000 HC OXYGEN THERAPY PER DAY

## 2019-08-23 PROCEDURE — 2580000003 HC RX 258: Performed by: FAMILY MEDICINE

## 2019-08-23 PROCEDURE — 99232 SBSQ HOSP IP/OBS MODERATE 35: CPT | Performed by: NURSE PRACTITIONER

## 2019-08-23 PROCEDURE — 97535 SELF CARE MNGMENT TRAINING: CPT

## 2019-08-23 PROCEDURE — 85025 COMPLETE CBC W/AUTO DIFF WBC: CPT

## 2019-08-23 PROCEDURE — 97530 THERAPEUTIC ACTIVITIES: CPT

## 2019-08-23 PROCEDURE — 6370000000 HC RX 637 (ALT 250 FOR IP): Performed by: FAMILY MEDICINE

## 2019-08-23 PROCEDURE — 99232 SBSQ HOSP IP/OBS MODERATE 35: CPT | Performed by: INTERNAL MEDICINE

## 2019-08-23 PROCEDURE — 51798 US URINE CAPACITY MEASURE: CPT

## 2019-08-23 RX ORDER — 0.9 % SODIUM CHLORIDE 0.9 %
250 INTRAVENOUS SOLUTION INTRAVENOUS ONCE
Status: COMPLETED | OUTPATIENT
Start: 2019-08-23 | End: 2019-08-23

## 2019-08-23 RX ORDER — LEVOFLOXACIN 5 MG/ML
500 INJECTION, SOLUTION INTRAVENOUS EVERY 24 HOURS
Status: DISCONTINUED | OUTPATIENT
Start: 2019-08-23 | End: 2019-08-23 | Stop reason: DRUGHIGH

## 2019-08-23 RX ORDER — LEVOFLOXACIN 5 MG/ML
750 INJECTION, SOLUTION INTRAVENOUS
Status: DISCONTINUED | OUTPATIENT
Start: 2019-08-23 | End: 2019-08-26 | Stop reason: HOSPADM

## 2019-08-23 RX ADMIN — PIPERACILLIN AND TAZOBACTAM 3.38 G: 3; .375 INJECTION, POWDER, LYOPHILIZED, FOR SOLUTION INTRAVENOUS at 10:09

## 2019-08-23 RX ADMIN — DOCUSATE SODIUM 100 MG: 100 CAPSULE, LIQUID FILLED ORAL at 20:10

## 2019-08-23 RX ADMIN — IPRATROPIUM BROMIDE 0.5 MG: 0.5 SOLUTION RESPIRATORY (INHALATION) at 10:28

## 2019-08-23 RX ADMIN — IPRATROPIUM BROMIDE 0.5 MG: 0.5 SOLUTION RESPIRATORY (INHALATION) at 06:33

## 2019-08-23 RX ADMIN — APIXABAN 10 MG: 5 TABLET, FILM COATED ORAL at 08:03

## 2019-08-23 RX ADMIN — VITAMIN C 1 TABLET: TAB at 08:04

## 2019-08-23 RX ADMIN — LEVOTHYROXINE SODIUM 100 MCG: 0.1 TABLET ORAL at 06:27

## 2019-08-23 RX ADMIN — IPRATROPIUM BROMIDE 0.5 MG: 0.5 SOLUTION RESPIRATORY (INHALATION) at 14:58

## 2019-08-23 RX ADMIN — Medication 10 ML: at 08:23

## 2019-08-23 RX ADMIN — PREDNISONE 10 MG: 5 TABLET ORAL at 08:03

## 2019-08-23 RX ADMIN — FUROSEMIDE 40 MG: 40 TABLET ORAL at 08:04

## 2019-08-23 RX ADMIN — MICONAZOLE NITRATE: 20 POWDER TOPICAL at 12:00

## 2019-08-23 RX ADMIN — POTASSIUM CHLORIDE 20 MEQ: 20 TABLET, EXTENDED RELEASE ORAL at 08:03

## 2019-08-23 RX ADMIN — PANTOPRAZOLE SODIUM 40 MG: 40 TABLET, DELAYED RELEASE ORAL at 06:27

## 2019-08-23 RX ADMIN — ACETAMINOPHEN 650 MG: 325 TABLET ORAL at 06:27

## 2019-08-23 RX ADMIN — GUAIFENESIN 600 MG: 600 TABLET, EXTENDED RELEASE ORAL at 08:03

## 2019-08-23 RX ADMIN — OXYCODONE HYDROCHLORIDE 5 MG: 5 TABLET ORAL at 06:34

## 2019-08-23 RX ADMIN — ATORVASTATIN CALCIUM 10 MG: 10 TABLET, FILM COATED ORAL at 08:03

## 2019-08-23 RX ADMIN — APIXABAN 10 MG: 5 TABLET, FILM COATED ORAL at 20:09

## 2019-08-23 RX ADMIN — PREGABALIN 75 MG: 75 CAPSULE ORAL at 20:10

## 2019-08-23 RX ADMIN — DONEPEZIL HYDROCHLORIDE 10 MG: 10 TABLET, ORALLY DISINTEGRATING ORAL at 20:09

## 2019-08-23 RX ADMIN — LEVOFLOXACIN 750 MG: 5 INJECTION, SOLUTION INTRAVENOUS at 08:10

## 2019-08-23 RX ADMIN — PIPERACILLIN AND TAZOBACTAM 3.38 G: 3; .375 INJECTION, POWDER, LYOPHILIZED, FOR SOLUTION INTRAVENOUS at 18:09

## 2019-08-23 RX ADMIN — GUAIFENESIN 600 MG: 600 TABLET, EXTENDED RELEASE ORAL at 20:10

## 2019-08-23 RX ADMIN — IPRATROPIUM BROMIDE 0.5 MG: 0.5 SOLUTION RESPIRATORY (INHALATION) at 19:24

## 2019-08-23 RX ADMIN — DOCUSATE SODIUM 100 MG: 100 CAPSULE, LIQUID FILLED ORAL at 08:04

## 2019-08-23 RX ADMIN — SODIUM CHLORIDE 250 ML: 9 INJECTION, SOLUTION INTRAVENOUS at 14:02

## 2019-08-23 ASSESSMENT — PAIN DESCRIPTION - ORIENTATION: ORIENTATION: RIGHT

## 2019-08-23 ASSESSMENT — PAIN DESCRIPTION - PAIN TYPE: TYPE: SURGICAL PAIN;ACUTE PAIN

## 2019-08-23 ASSESSMENT — ENCOUNTER SYMPTOMS
GASTROINTESTINAL NEGATIVE: 1
SHORTNESS OF BREATH: 1
COUGH: 1

## 2019-08-23 ASSESSMENT — PAIN SCALES - GENERAL
PAINLEVEL_OUTOF10: 5

## 2019-08-23 ASSESSMENT — PAIN DESCRIPTION - DESCRIPTORS: DESCRIPTORS: SORE

## 2019-08-23 ASSESSMENT — PAIN DESCRIPTION - LOCATION: LOCATION: HAND

## 2019-08-23 NOTE — CARE COORDINATION
Pt has been accepted at [facility]. Pt may DC over the weekend on either August 24th or 25th if medically cleared from the hospital. If Pt is not medically cleared then Pt can DC on Monday August 26th. Will update as needed.    [facility]   [facility]   [facility]  Electronically signed by Alannah Chavarria on 8/23/2019 at 10:10 AM

## 2019-08-23 NOTE — PROGRESS NOTES
Fadumo Patterson MD        sodium chloride flush 0.9 % injection 10 mL  10 mL Intravenous 2 times per day Fadumo Patterson MD   10 mL at 08/23/19 0823    sodium chloride flush 0.9 % injection 10 mL  10 mL Intravenous PRN Fadumo Patterson MD        docusate sodium (COLACE) capsule 100 mg  100 mg Oral BID Fadumo Patterson MD   100 mg at 08/23/19 0804    oxyCODONE (ROXICODONE) immediate release tablet 5 mg  5 mg Oral Q4H PRN Fadumo Patterson MD   5 mg at 08/23/19 6425    Or    oxyCODONE (ROXICODONE) immediate release tablet 10 mg  10 mg Oral Q4H PRN Fadumo Patterson MD        acetaminophen (TYLENOL) tablet 650 mg  650 mg Oral Q4H PRN Fadumo Patterson MD   650 mg at 08/23/19 5268    polyethylene glycol (GLYCOLAX) packet 17 g  17 g Oral Daily PRN Fadumo Patterson MD        bisacodyl (DULCOLAX) suppository 10 mg  10 mg Rectal Daily PRN Fadumo Patterson MD            Labs:     Recent Labs     08/22/19  5249 08/22/19  1616 08/23/19  0256   WBC 15.6* 12.3* 14.2*   RBC 2.48* 3.16* 3.03*   HGB 6.9* 9.0* 8.5*   HCT 22.0* 28.5* 27.7*   MCV 88.7 90.2 91.4   MCH 27.8 28.5 28.1   MCHC 31.4* 31.6* 30.7*    245 268     Recent Labs     08/21/19  2305 08/22/19  0611 08/23/19  0256    142 141   K 3.9 4.1 3.3*   ANIONGAP 12 11 14    109 107   CO2 21* 22 20*   BUN 13 15 16   CREATININE <0.5 <0.5 <0.5   GLUCOSE 182* 89 99   CALCIUM 8.5* 8.1* 8.1*     No results for input(s): MG, PHOS in the last 72 hours. No results for input(s): AST, ALT, ALB, BILITOT, ALKPHOS, ALB in the last 72 hours. ABGs:No results for input(s): PHART, YBP1FYN, PO2ART, VAB8JTV, BEART, HGBAE, Z8EIBNIC, CARBOXHGBART, 02THERAPY in the last 72 hours. HgBA1c: No results for input(s): LABA1C in the last 72 hours.   FLP:    Lab Results   Component Value Date    TRIG 151 12/09/2016    HDL 47 12/09/2016    LDLCALC 85 12/09/2016    LDLDIRECT 65 12/13/2015    LABVLDL 34 12/13/2015     TSH:    Lab Results   Component Value Date    TSH 48.86 12/09/2016     Troponin T: No results for

## 2019-08-24 ENCOUNTER — OUTSIDE FACILITY SERVICE (OUTPATIENT)
Dept: PULMONOLOGY | Facility: CLINIC | Age: 84
End: 2019-08-24

## 2019-08-24 LAB
ANION GAP SERPL CALCULATED.3IONS-SCNC: 14 MMOL/L (ref 7–19)
BASOPHILS ABSOLUTE: 0 K/UL (ref 0–0.2)
BASOPHILS RELATIVE PERCENT: 0.1 % (ref 0–1)
BUN BLDV-MCNC: 14 MG/DL (ref 8–23)
CALCIUM SERPL-MCNC: 8.4 MG/DL (ref 8.8–10.2)
CHLORIDE BLD-SCNC: 103 MMOL/L (ref 98–111)
CO2: 22 MMOL/L (ref 22–29)
CREAT SERPL-MCNC: 0.5 MG/DL (ref 0.5–0.9)
EOSINOPHILS ABSOLUTE: 0.1 K/UL (ref 0–0.6)
EOSINOPHILS RELATIVE PERCENT: 0.4 % (ref 0–5)
GFR NON-AFRICAN AMERICAN: >60
GLUCOSE BLD-MCNC: 91 MG/DL (ref 74–109)
HCT VFR BLD CALC: 26.5 % (ref 37–47)
HEMOGLOBIN: 8.1 G/DL (ref 12–16)
IMMATURE GRANULOCYTES #: 0.5 K/UL
LYMPHOCYTES ABSOLUTE: 1.5 K/UL (ref 1.1–4.5)
LYMPHOCYTES RELATIVE PERCENT: 11.4 % (ref 20–40)
MCH RBC QN AUTO: 28 PG (ref 27–31)
MCHC RBC AUTO-ENTMCNC: 30.6 G/DL (ref 33–37)
MCV RBC AUTO: 91.7 FL (ref 81–99)
MONOCYTES ABSOLUTE: 1.5 K/UL (ref 0–0.9)
MONOCYTES RELATIVE PERCENT: 10.9 % (ref 0–10)
NEUTROPHILS ABSOLUTE: 9.8 K/UL (ref 1.5–7.5)
NEUTROPHILS RELATIVE PERCENT: 73.3 % (ref 50–65)
PDW BLD-RTO: 15.9 % (ref 11.5–14.5)
PLATELET # BLD: 278 K/UL (ref 130–400)
PMV BLD AUTO: 10.5 FL (ref 9.4–12.3)
POTASSIUM SERPL-SCNC: 3.2 MMOL/L (ref 3.5–5)
RBC # BLD: 2.89 M/UL (ref 4.2–5.4)
SODIUM BLD-SCNC: 139 MMOL/L (ref 136–145)
WBC # BLD: 13.4 K/UL (ref 4.8–10.8)

## 2019-08-24 PROCEDURE — 6370000000 HC RX 637 (ALT 250 FOR IP): Performed by: ORTHOPAEDIC SURGERY

## 2019-08-24 PROCEDURE — 36415 COLL VENOUS BLD VENIPUNCTURE: CPT

## 2019-08-24 PROCEDURE — 6360000002 HC RX W HCPCS: Performed by: FAMILY MEDICINE

## 2019-08-24 PROCEDURE — 1210000000 HC MED SURG R&B

## 2019-08-24 PROCEDURE — 80048 BASIC METABOLIC PNL TOTAL CA: CPT

## 2019-08-24 PROCEDURE — 2580000003 HC RX 258: Performed by: ORTHOPAEDIC SURGERY

## 2019-08-24 PROCEDURE — 2580000003 HC RX 258: Performed by: FAMILY MEDICINE

## 2019-08-24 PROCEDURE — 6360000002 HC RX W HCPCS: Performed by: ORTHOPAEDIC SURGERY

## 2019-08-24 PROCEDURE — 99232 SBSQ HOSP IP/OBS MODERATE 35: CPT | Performed by: INTERNAL MEDICINE

## 2019-08-24 PROCEDURE — 6370000000 HC RX 637 (ALT 250 FOR IP): Performed by: FAMILY MEDICINE

## 2019-08-24 PROCEDURE — 2700000000 HC OXYGEN THERAPY PER DAY

## 2019-08-24 PROCEDURE — 94640 AIRWAY INHALATION TREATMENT: CPT

## 2019-08-24 PROCEDURE — 97530 THERAPEUTIC ACTIVITIES: CPT

## 2019-08-24 PROCEDURE — 85025 COMPLETE CBC W/AUTO DIFF WBC: CPT

## 2019-08-24 RX ORDER — POTASSIUM CHLORIDE 20 MEQ/1
20 TABLET, EXTENDED RELEASE ORAL 2 TIMES DAILY WITH MEALS
Status: DISCONTINUED | OUTPATIENT
Start: 2019-08-24 | End: 2019-08-25

## 2019-08-24 RX ADMIN — DONEPEZIL HYDROCHLORIDE 10 MG: 10 TABLET, ORALLY DISINTEGRATING ORAL at 21:06

## 2019-08-24 RX ADMIN — MICONAZOLE NITRATE: 20 POWDER TOPICAL at 21:07

## 2019-08-24 RX ADMIN — Medication 10 ML: at 10:43

## 2019-08-24 RX ADMIN — PREGABALIN 75 MG: 75 CAPSULE ORAL at 21:06

## 2019-08-24 RX ADMIN — GUAIFENESIN 600 MG: 600 TABLET, EXTENDED RELEASE ORAL at 10:12

## 2019-08-24 RX ADMIN — PIPERACILLIN AND TAZOBACTAM 3.38 G: 3; .375 INJECTION, POWDER, LYOPHILIZED, FOR SOLUTION INTRAVENOUS at 10:13

## 2019-08-24 RX ADMIN — ATORVASTATIN CALCIUM 10 MG: 10 TABLET, FILM COATED ORAL at 10:12

## 2019-08-24 RX ADMIN — IPRATROPIUM BROMIDE 0.5 MG: 0.5 SOLUTION RESPIRATORY (INHALATION) at 07:37

## 2019-08-24 RX ADMIN — Medication 10 ML: at 21:08

## 2019-08-24 RX ADMIN — MICONAZOLE NITRATE: 20 POWDER TOPICAL at 10:44

## 2019-08-24 RX ADMIN — IPRATROPIUM BROMIDE 0.5 MG: 0.5 SOLUTION RESPIRATORY (INHALATION) at 18:01

## 2019-08-24 RX ADMIN — PREDNISONE 10 MG: 5 TABLET ORAL at 10:13

## 2019-08-24 RX ADMIN — IPRATROPIUM BROMIDE 0.5 MG: 0.5 SOLUTION RESPIRATORY (INHALATION) at 11:09

## 2019-08-24 RX ADMIN — VITAMIN C 1 TABLET: TAB at 10:12

## 2019-08-24 RX ADMIN — PANTOPRAZOLE SODIUM 40 MG: 40 TABLET, DELAYED RELEASE ORAL at 05:46

## 2019-08-24 RX ADMIN — DOCUSATE SODIUM 100 MG: 100 CAPSULE, LIQUID FILLED ORAL at 21:06

## 2019-08-24 RX ADMIN — DOCUSATE SODIUM 100 MG: 100 CAPSULE, LIQUID FILLED ORAL at 10:12

## 2019-08-24 RX ADMIN — LEVOTHYROXINE SODIUM 100 MCG: 0.1 TABLET ORAL at 05:46

## 2019-08-24 RX ADMIN — PIPERACILLIN AND TAZOBACTAM 3.38 G: 3; .375 INJECTION, POWDER, LYOPHILIZED, FOR SOLUTION INTRAVENOUS at 18:11

## 2019-08-24 RX ADMIN — POTASSIUM CHLORIDE 20 MEQ: 20 TABLET, EXTENDED RELEASE ORAL at 18:11

## 2019-08-24 RX ADMIN — ACETAMINOPHEN 650 MG: 325 TABLET ORAL at 05:46

## 2019-08-24 RX ADMIN — PIPERACILLIN AND TAZOBACTAM 3.38 G: 3; .375 INJECTION, POWDER, LYOPHILIZED, FOR SOLUTION INTRAVENOUS at 00:32

## 2019-08-24 RX ADMIN — IPRATROPIUM BROMIDE 0.5 MG: 0.5 SOLUTION RESPIRATORY (INHALATION) at 14:11

## 2019-08-24 RX ADMIN — GUAIFENESIN 600 MG: 600 TABLET, EXTENDED RELEASE ORAL at 21:06

## 2019-08-24 ASSESSMENT — PAIN DESCRIPTION - LOCATION: LOCATION: HIP

## 2019-08-24 ASSESSMENT — PAIN SCALES - WONG BAKER: WONGBAKER_NUMERICALRESPONSE: 6

## 2019-08-24 ASSESSMENT — PAIN SCALES - GENERAL: PAINLEVEL_OUTOF10: 4

## 2019-08-24 ASSESSMENT — PAIN DESCRIPTION - PAIN TYPE: TYPE: SURGICAL PAIN

## 2019-08-24 ASSESSMENT — PAIN DESCRIPTION - ORIENTATION: ORIENTATION: RIGHT

## 2019-08-24 NOTE — PROGRESS NOTES
Nutrition Assessment    Type and Reason for Visit: Initial, Positive Nutrition Screen    Nutrition Recommendations: ONS TID    Nutrition Assessment: Pt is nutritionally compromised AEB BMI. PO intake is currently good. Will modify ONS order from BID to TID to promote desired wt gain. Malnutrition Assessment:  · Malnutrition Status: At risk for malnutrition    Nutrition Risk Level:  Moderate    Nutrient Needs:  · Estimated Daily Total Kcal: 4329-4170 kcals/day  · Estimated Daily Protein (g): 65-86 g/PRO/day  · Estimated Daily Total Fluid (ml/day): 2661-8728 mL/day    Nutrition Diagnosis:   · Problem: Underweight  · Etiology: related to Catabolic illness     Signs and symptoms:  as evidenced by BMI    Objective Information:  · Wound Type: Surgical Wound  · Current Nutrition Therapies:  · Oral Diet Orders: Cardiac   · Oral Diet intake: 51-75%  · Oral Nutrition Supplement (ONS) Orders: Standard High Calorie Oral Supplement  · Anthropometric Measures:  · Ht: 5' 4\" (162.6 cm)   · Current Body Wt: 95 lb (43.1 kg)  · BMI Classification: BMI <18.5 Underweight    Nutrition Interventions:   Continue current diet, Modify current ONS  Continued Inpatient Monitoring    Nutrition Evaluation:   · Evaluation: Goals set   · Goals: Pt will steadily gain wt    · Monitoring: Meal Intake, Supplement Intake, Skin Integrity, Weight, Pertinent Labs      Electronically signed by Debbie West, MS, RD, LD on 8/24/19 at 9:21 AM    Contact Number: [phone]

## 2019-08-24 NOTE — PROGRESS NOTES
Progress Note  8/24/2019 9:23 AM  Subjective:   Admit Date: 8/17/2019  PCP: Kranthi Ledesma MD    Interval History: No further hemoptysis. She states she feels better. DIET CARDIAC; Dietary Nutrition Supplements: Standard High Calorie Oral Supplement    Intake/Output Summary (Last 24 hours) at 8/24/2019 0923  Last data filed at 8/24/2019 0916  Gross per 24 hour   Intake 891.33 ml   Output 950 ml   Net -58.67 ml     Medications:        piperacillin-tazobactam  3.375 g Intravenous Q8H    levofloxacin  750 mg Intravenous Q48H    [START ON 8/27/2019] apixaban  5 mg Oral BID    predniSONE  10 mg Oral Daily    apixaban  10 mg Oral BID    atorvastatin  10 mg Oral Daily    vitamin B and C  1 tablet Oral Daily    donepezil  10 mg Oral Nightly    pantoprazole  40 mg Oral QAM AC    furosemide  40 mg Oral Daily    levothyroxine  100 mcg Oral Daily    miconazole   Topical BID    potassium chloride  20 mEq Oral Daily    pregabalin  75 mg Oral Nightly    ipratropium  0.5 mg Nebulization 4x daily    guaiFENesin  600 mg Oral BID    sodium chloride flush  10 mL Intravenous 2 times per day    docusate sodium  100 mg Oral BID     Recent Labs     08/22/19  1616 08/23/19  0256 08/24/19  0252   WBC 12.3* 14.2* 13.4*   HGB 9.0* 8.5* 8.1*    268 278     Recent Labs     08/22/19  0611 08/23/19  0256 08/24/19  0252    141 139   K 4.1 3.3* 3.2*    107 103   CO2 22 20* 22   BUN 15 16 14   CREATININE <0.5 <0.5 0.5   GLUCOSE 89 99 91     No results for input(s): AST, ALT, ALB, BILITOT, ALKPHOS in the last 72 hours.     Objective:   Vitals: BP (!) 99/57   Pulse 94   Temp 98.7 °F (37.1 °C) (Temporal)   Resp 16   Ht 5' 4\" (1.626 m)   Wt 95 lb (43.1 kg)   SpO2 97%   BMI 16.31 kg/m²   General appearance: alert and cooperative with exam  Lungs: Bilateral wheezes and rhonci  Heart: regular rate and rhythm, S1, S2 normal, no murmur, click, rub or gallop  Abdomen: soft, non-tender; bowel sounds normal; no masses,  no organomegaly  Extremities: extremities normal, atraumatic, no cyanosis or edema  Neurologic: No obvious focal neurologic deficits. Assessment and Plan:   1. Right IM nailing after fall  and fracture: POD#1, per Dr Brittani Don. Continue eliquis. 2. Hemoptysis: This has stopped for now. Dr Gabbi Duncan has seen the patient. I have continued to give her the eliquis and the bleeding seems to have stopped. Due to the hemoptysis and suspected pneumonia , I would like to hold the transfer to [facility] until Monday. 3. Bilateral pulm emboli: Legs negative for DVT. Continue eliquis. 4. Elevated BNP: Possibly fluid overload vs heart strain from the PEs or a combination of both. She is on lasix and potassium. Recheck in the am.  5. Possible pneumonia: Her wbcs have improved. She seems to feel a little better. 6. COPD with hypoxia: Her oxygenation seems better. Continue nebs, prednisone and pulmonary toilet. 7. Lung mass that looks like cancer with mets: CT lung shows a likely right lung neoplasm with mets. Palliative care has seen her. 8. Hypokalemia: Replete. 9. Anemia of CD, with some acute loss from the fracture and the surgery: This is better after a unit of blood. 10. Poor appetite: Hopefully this will improve as she is getting better. The prednisone may help boost her appetite as well. 11. Dementia: Seems stable. Advance Directive: DNR-CC  DVT prophylaxis with eliquis. Discharge planning: Active Problems:    Intertrochanteric fracture of right femur, closed, initial encounter [facility])    Palliative care patient    Closed fracture of trochanter of right femur ([address]    Lung infiltrate  Resolved Problems:    * No resolved hospital problems.  Svitlana Rendon MD

## 2019-08-24 NOTE — PROGRESS NOTES
Pulmonary and Critical Care Progress Note [address] [facility]    Patient: Cass Baig  1935   MR# [id number]   Acct# [de-identified]  08/24/19   10:51 AM  Referring Provider: Nickie Snyder MD    Chief Complaint: Hemoptysis and hypoxemia. Interval history: The patient remains stable from a pulmonary standpoint. She appears in no distress on O2 via nasal cannula and remains in good spirits. Medications:   potassium chloride, 20 mEq, Oral, BID WC    piperacillin-tazobactam, 3.375 g, Intravenous, Q8H    levofloxacin, 750 mg, Intravenous, Q48H    [START ON 8/27/2019] apixaban, 5 mg, Oral, BID    predniSONE, 10 mg, Oral, Daily    atorvastatin, 10 mg, Oral, Daily    vitamin B and C, 1 tablet, Oral, Daily    donepezil, 10 mg, Oral, Nightly    pantoprazole, 40 mg, Oral, QAM AC    furosemide, 40 mg, Oral, Daily    levothyroxine, 100 mcg, Oral, Daily    miconazole, , Topical, BID    pregabalin, 75 mg, Oral, Nightly    ipratropium, 0.5 mg, Nebulization, 4x daily    guaiFENesin, 600 mg, Oral, BID    sodium chloride flush, 10 mL, Intravenous, 2 times per day    docusate sodium, 100 mg, Oral, BID   Review of Systems: Review of Systems Review of Systems   Constitutional: Negative for chills and fever. HENT: Negative. Negative for congestion and sore throat. Eyes: Negative. Negative for discharge and itching. Respiratory: Positive for cough with hemoptysis and also positive for dyspnea. Cardiovascular: Negative. Negative for chest pain, palpitations and leg swelling. Gastrointestinal: Negative. Negative for diarrhea and vomiting. Musculoskeletal:        Right hip pain    Skin: Negative. Negative for rash and wound. Neurological:        Hx of dementia on aricept    Psychiatric/Behavioral: Negative. Negative for agitation and behavioral problems.      Physical Exam:  Blood pressure (!) 108/52, pulse 82, temperature 97.6 °F (36.4 °C), temperature source Temporal, resp.  rate 18, height

## 2019-08-25 ENCOUNTER — APPOINTMENT (OUTPATIENT)
Dept: GENERAL RADIOLOGY | Age: 84
DRG: 480 | End: 2019-08-25
Payer: MEDICARE

## 2019-08-25 ENCOUNTER — OUTSIDE FACILITY SERVICE (OUTPATIENT)
Dept: PULMONOLOGY | Facility: CLINIC | Age: 84
End: 2019-08-25

## 2019-08-25 LAB
ANION GAP SERPL CALCULATED.3IONS-SCNC: 14 MMOL/L (ref 7–19)
BASOPHILS ABSOLUTE: 0 K/UL (ref 0–0.2)
BASOPHILS RELATIVE PERCENT: 0.1 % (ref 0–1)
BUN BLDV-MCNC: 12 MG/DL (ref 8–23)
CALCIUM SERPL-MCNC: 8.4 MG/DL (ref 8.8–10.2)
CHLORIDE BLD-SCNC: 104 MMOL/L (ref 98–111)
CO2: 23 MMOL/L (ref 22–29)
CREAT SERPL-MCNC: 0.5 MG/DL (ref 0.5–0.9)
EOSINOPHILS ABSOLUTE: 0.1 K/UL (ref 0–0.6)
EOSINOPHILS RELATIVE PERCENT: 0.4 % (ref 0–5)
GFR NON-AFRICAN AMERICAN: >60
GLUCOSE BLD-MCNC: 96 MG/DL (ref 74–109)
HCT VFR BLD CALC: 27.3 % (ref 37–47)
HEMOGLOBIN: 8.4 G/DL (ref 12–16)
IMMATURE GRANULOCYTES #: 0.4 K/UL
LYMPHOCYTES ABSOLUTE: 1 K/UL (ref 1.1–4.5)
LYMPHOCYTES RELATIVE PERCENT: 7.4 % (ref 20–40)
MCH RBC QN AUTO: 28.4 PG (ref 27–31)
MCHC RBC AUTO-ENTMCNC: 30.8 G/DL (ref 33–37)
MCV RBC AUTO: 92.2 FL (ref 81–99)
MONOCYTES ABSOLUTE: 1.7 K/UL (ref 0–0.9)
MONOCYTES RELATIVE PERCENT: 12.5 % (ref 0–10)
NEUTROPHILS ABSOLUTE: 10.2 K/UL (ref 1.5–7.5)
NEUTROPHILS RELATIVE PERCENT: 76.3 % (ref 50–65)
PDW BLD-RTO: 16.3 % (ref 11.5–14.5)
PLATELET # BLD: 299 K/UL (ref 130–400)
PMV BLD AUTO: 10.7 FL (ref 9.4–12.3)
POTASSIUM SERPL-SCNC: 3 MMOL/L (ref 3.5–5)
PRO-BNP: 3747 PG/ML (ref 0–1800)
RBC # BLD: 2.96 M/UL (ref 4.2–5.4)
SODIUM BLD-SCNC: 141 MMOL/L (ref 136–145)
WBC # BLD: 13.4 K/UL (ref 4.8–10.8)

## 2019-08-25 PROCEDURE — 6370000000 HC RX 637 (ALT 250 FOR IP): Performed by: FAMILY MEDICINE

## 2019-08-25 PROCEDURE — 6370000000 HC RX 637 (ALT 250 FOR IP): Performed by: ORTHOPAEDIC SURGERY

## 2019-08-25 PROCEDURE — 83880 ASSAY OF NATRIURETIC PEPTIDE: CPT

## 2019-08-25 PROCEDURE — 2580000003 HC RX 258: Performed by: ORTHOPAEDIC SURGERY

## 2019-08-25 PROCEDURE — 80048 BASIC METABOLIC PNL TOTAL CA: CPT

## 2019-08-25 PROCEDURE — 85025 COMPLETE CBC W/AUTO DIFF WBC: CPT

## 2019-08-25 PROCEDURE — 36415 COLL VENOUS BLD VENIPUNCTURE: CPT

## 2019-08-25 PROCEDURE — 71046 X-RAY EXAM CHEST 2 VIEWS: CPT

## 2019-08-25 PROCEDURE — 2580000003 HC RX 258: Performed by: FAMILY MEDICINE

## 2019-08-25 PROCEDURE — 1210000000 HC MED SURG R&B

## 2019-08-25 PROCEDURE — 94640 AIRWAY INHALATION TREATMENT: CPT

## 2019-08-25 PROCEDURE — 6360000002 HC RX W HCPCS: Performed by: FAMILY MEDICINE

## 2019-08-25 PROCEDURE — 2700000000 HC OXYGEN THERAPY PER DAY

## 2019-08-25 PROCEDURE — 6360000002 HC RX W HCPCS: Performed by: ORTHOPAEDIC SURGERY

## 2019-08-25 PROCEDURE — 99232 SBSQ HOSP IP/OBS MODERATE 35: CPT | Performed by: INTERNAL MEDICINE

## 2019-08-25 RX ORDER — POTASSIUM CHLORIDE 20 MEQ/1
20 TABLET, EXTENDED RELEASE ORAL
Status: DISCONTINUED | OUTPATIENT
Start: 2019-08-25 | End: 2019-08-26 | Stop reason: HOSPADM

## 2019-08-25 RX ADMIN — POTASSIUM CHLORIDE 20 MEQ: 20 TABLET, EXTENDED RELEASE ORAL at 17:08

## 2019-08-25 RX ADMIN — PIPERACILLIN AND TAZOBACTAM 3.38 G: 3; .375 INJECTION, POWDER, LYOPHILIZED, FOR SOLUTION INTRAVENOUS at 23:57

## 2019-08-25 RX ADMIN — PIPERACILLIN AND TAZOBACTAM 3.38 G: 3; .375 INJECTION, POWDER, LYOPHILIZED, FOR SOLUTION INTRAVENOUS at 17:08

## 2019-08-25 RX ADMIN — MICONAZOLE NITRATE: 20 POWDER TOPICAL at 19:45

## 2019-08-25 RX ADMIN — ATORVASTATIN CALCIUM 10 MG: 10 TABLET, FILM COATED ORAL at 07:51

## 2019-08-25 RX ADMIN — PREDNISONE 10 MG: 5 TABLET ORAL at 07:51

## 2019-08-25 RX ADMIN — PREGABALIN 75 MG: 75 CAPSULE ORAL at 19:44

## 2019-08-25 RX ADMIN — MICONAZOLE NITRATE: 20 POWDER TOPICAL at 07:52

## 2019-08-25 RX ADMIN — GUAIFENESIN 600 MG: 600 TABLET, EXTENDED RELEASE ORAL at 19:44

## 2019-08-25 RX ADMIN — ACETAMINOPHEN 650 MG: 325 TABLET ORAL at 03:20

## 2019-08-25 RX ADMIN — PIPERACILLIN AND TAZOBACTAM 3.38 G: 3; .375 INJECTION, POWDER, LYOPHILIZED, FOR SOLUTION INTRAVENOUS at 03:20

## 2019-08-25 RX ADMIN — ACETAMINOPHEN 650 MG: 325 TABLET ORAL at 23:57

## 2019-08-25 RX ADMIN — DONEPEZIL HYDROCHLORIDE 10 MG: 10 TABLET, ORALLY DISINTEGRATING ORAL at 19:44

## 2019-08-25 RX ADMIN — IPRATROPIUM BROMIDE 0.5 MG: 0.5 SOLUTION RESPIRATORY (INHALATION) at 11:22

## 2019-08-25 RX ADMIN — PANTOPRAZOLE SODIUM 40 MG: 40 TABLET, DELAYED RELEASE ORAL at 06:36

## 2019-08-25 RX ADMIN — IPRATROPIUM BROMIDE 0.5 MG: 0.5 SOLUTION RESPIRATORY (INHALATION) at 15:06

## 2019-08-25 RX ADMIN — PIPERACILLIN AND TAZOBACTAM 3.38 G: 3; .375 INJECTION, POWDER, LYOPHILIZED, FOR SOLUTION INTRAVENOUS at 09:39

## 2019-08-25 RX ADMIN — GUAIFENESIN 600 MG: 600 TABLET, EXTENDED RELEASE ORAL at 07:51

## 2019-08-25 RX ADMIN — LEVOTHYROXINE SODIUM 100 MCG: 0.1 TABLET ORAL at 06:36

## 2019-08-25 RX ADMIN — Medication 10 ML: at 07:52

## 2019-08-25 RX ADMIN — POTASSIUM CHLORIDE 20 MEQ: 20 TABLET, EXTENDED RELEASE ORAL at 13:18

## 2019-08-25 RX ADMIN — ACETAMINOPHEN 650 MG: 325 TABLET ORAL at 17:32

## 2019-08-25 RX ADMIN — DOCUSATE SODIUM 100 MG: 100 CAPSULE, LIQUID FILLED ORAL at 19:44

## 2019-08-25 RX ADMIN — IPRATROPIUM BROMIDE 0.5 MG: 0.5 SOLUTION RESPIRATORY (INHALATION) at 19:02

## 2019-08-25 RX ADMIN — ACETAMINOPHEN 650 MG: 325 TABLET ORAL at 07:51

## 2019-08-25 RX ADMIN — VITAMIN C 1 TABLET: TAB at 07:52

## 2019-08-25 RX ADMIN — LEVOFLOXACIN 750 MG: 5 INJECTION, SOLUTION INTRAVENOUS at 07:05

## 2019-08-25 RX ADMIN — POTASSIUM CHLORIDE 20 MEQ: 20 TABLET, EXTENDED RELEASE ORAL at 07:52

## 2019-08-25 RX ADMIN — OXYCODONE HYDROCHLORIDE 5 MG: 5 TABLET ORAL at 18:43

## 2019-08-25 RX ADMIN — ACETAMINOPHEN 650 MG: 325 TABLET ORAL at 13:49

## 2019-08-25 ASSESSMENT — PAIN DESCRIPTION - PAIN TYPE
TYPE: SURGICAL PAIN

## 2019-08-25 ASSESSMENT — PAIN SCALES - GENERAL
PAINLEVEL_OUTOF10: 2
PAINLEVEL_OUTOF10: 5
PAINLEVEL_OUTOF10: 3
PAINLEVEL_OUTOF10: 9
PAINLEVEL_OUTOF10: 7
PAINLEVEL_OUTOF10: 3
PAINLEVEL_OUTOF10: 6
PAINLEVEL_OUTOF10: 3
PAINLEVEL_OUTOF10: 10

## 2019-08-25 ASSESSMENT — PAIN DESCRIPTION - DESCRIPTORS
DESCRIPTORS: SORE
DESCRIPTORS: DULL;SORE

## 2019-08-25 ASSESSMENT — PAIN DESCRIPTION - FREQUENCY
FREQUENCY: INTERMITTENT
FREQUENCY: INTERMITTENT

## 2019-08-25 ASSESSMENT — PAIN DESCRIPTION - ORIENTATION
ORIENTATION: RIGHT

## 2019-08-25 ASSESSMENT — PAIN DESCRIPTION - ONSET
ONSET: ON-GOING
ONSET: ON-GOING

## 2019-08-25 ASSESSMENT — PAIN DESCRIPTION - LOCATION
LOCATION: HIP

## 2019-08-25 ASSESSMENT — PAIN - FUNCTIONAL ASSESSMENT
PAIN_FUNCTIONAL_ASSESSMENT: PREVENTS OR INTERFERES SOME ACTIVE ACTIVITIES AND ADLS
PAIN_FUNCTIONAL_ASSESSMENT: PREVENTS OR INTERFERES SOME ACTIVE ACTIVITIES AND ADLS

## 2019-08-25 ASSESSMENT — PAIN DESCRIPTION - PROGRESSION
CLINICAL_PROGRESSION: GRADUALLY IMPROVING
CLINICAL_PROGRESSION: GRADUALLY IMPROVING

## 2019-08-25 NOTE — PROGRESS NOTES
08/25/19 0915   General Comment   Comments Attempt in AM patient out to X-RAY   Physical Therapy    Electronically signed by Regi Nettles PTA on 8/25/2019 at 9:16 AM

## 2019-08-25 NOTE — PROGRESS NOTES
Progress Note  8/25/2019 9:27 AM  Subjective:   Admit Date: 8/17/2019  PCP: Bayron Salinas MD    Interval History: No further hemoptysis. She looks better she states she feels better. DIET CARDIAC; Dietary Nutrition Supplements: Standard High Calorie Oral Supplement    Intake/Output Summary (Last 24 hours) at 8/25/2019 0927  Last data filed at 8/25/2019 0458  Gross per 24 hour   Intake 420 ml   Output --   Net 420 ml     Medications:        potassium chloride  20 mEq Oral BID WC    piperacillin-tazobactam  3.375 g Intravenous Q8H    levofloxacin  750 mg Intravenous Q48H    [START ON 8/27/2019] apixaban  5 mg Oral BID    predniSONE  10 mg Oral Daily    atorvastatin  10 mg Oral Daily    vitamin B and C  1 tablet Oral Daily    donepezil  10 mg Oral Nightly    pantoprazole  40 mg Oral QAM AC    furosemide  40 mg Oral Daily    levothyroxine  100 mcg Oral Daily    miconazole   Topical BID    pregabalin  75 mg Oral Nightly    ipratropium  0.5 mg Nebulization 4x daily    guaiFENesin  600 mg Oral BID    sodium chloride flush  10 mL Intravenous 2 times per day    docusate sodium  100 mg Oral BID     Recent Labs     08/23/19  0256 08/24/19  0252 08/25/19  0301   WBC 14.2* 13.4* 13.4*   HGB 8.5* 8.1* 8.4*    278 299     Recent Labs     08/23/19  0256 08/24/19  0252 08/25/19  0301    139 141   K 3.3* 3.2* 3.0*    103 104   CO2 20* 22 23   BUN 16 14 12   CREATININE <0.5 0.5 0.5   GLUCOSE 99 91 96     No results for input(s): AST, ALT, ALB, BILITOT, ALKPHOS in the last 72 hours.     Objective:   Vitals: /60   Pulse 94   Temp 98.6 °F (37 °C) (Temporal)   Resp 16   Ht 5' 4\" (1.626 m)   Wt 95 lb (43.1 kg)   SpO2 96%   BMI 16.31 kg/m²   General appearance: alert and cooperative with exam  Lungs: Bilateral wheezes and rhonci  Heart: regular rate and rhythm, S1, S2 normal, no murmur, click, rub or gallop  Abdomen: soft, non-tender; bowel sounds normal; no masses,  no

## 2019-08-26 VITALS
HEART RATE: 92 BPM | HEIGHT: 64 IN | DIASTOLIC BLOOD PRESSURE: 57 MMHG | SYSTOLIC BLOOD PRESSURE: 102 MMHG | BODY MASS INDEX: 16.22 KG/M2 | RESPIRATION RATE: 16 BRPM | TEMPERATURE: 98.3 F | OXYGEN SATURATION: 95 % | WEIGHT: 95 LBS

## 2019-08-26 LAB
ANION GAP SERPL CALCULATED.3IONS-SCNC: 12 MMOL/L (ref 7–19)
BASOPHILS ABSOLUTE: 0 K/UL (ref 0–0.2)
BASOPHILS RELATIVE PERCENT: 0.2 % (ref 0–1)
BUN BLDV-MCNC: 11 MG/DL (ref 8–23)
CALCIUM SERPL-MCNC: 8.2 MG/DL (ref 8.8–10.2)
CHLORIDE BLD-SCNC: 106 MMOL/L (ref 98–111)
CO2: 22 MMOL/L (ref 22–29)
CREAT SERPL-MCNC: 0.5 MG/DL (ref 0.5–0.9)
EOSINOPHILS ABSOLUTE: 0.1 K/UL (ref 0–0.6)
EOSINOPHILS RELATIVE PERCENT: 0.8 % (ref 0–5)
GFR NON-AFRICAN AMERICAN: >60
GLUCOSE BLD-MCNC: 88 MG/DL (ref 74–109)
HCT VFR BLD CALC: 27.1 % (ref 37–47)
HEMOGLOBIN: 8.2 G/DL (ref 12–16)
IMMATURE GRANULOCYTES #: 0.4 K/UL
LYMPHOCYTES ABSOLUTE: 1.1 K/UL (ref 1.1–4.5)
LYMPHOCYTES RELATIVE PERCENT: 8.2 % (ref 20–40)
MAGNESIUM: 1.8 MG/DL (ref 1.6–2.4)
MCH RBC QN AUTO: 28.1 PG (ref 27–31)
MCHC RBC AUTO-ENTMCNC: 30.3 G/DL (ref 33–37)
MCV RBC AUTO: 92.8 FL (ref 81–99)
MONOCYTES ABSOLUTE: 1.5 K/UL (ref 0–0.9)
MONOCYTES RELATIVE PERCENT: 11.8 % (ref 0–10)
NEUTROPHILS ABSOLUTE: 9.8 K/UL (ref 1.5–7.5)
NEUTROPHILS RELATIVE PERCENT: 76 % (ref 50–65)
PDW BLD-RTO: 16.7 % (ref 11.5–14.5)
PLATELET # BLD: 315 K/UL (ref 130–400)
PMV BLD AUTO: 10.6 FL (ref 9.4–12.3)
POTASSIUM SERPL-SCNC: 4.3 MMOL/L (ref 3.5–5)
RBC # BLD: 2.92 M/UL (ref 4.2–5.4)
SODIUM BLD-SCNC: 140 MMOL/L (ref 136–145)
WBC # BLD: 12.9 K/UL (ref 4.8–10.8)

## 2019-08-26 PROCEDURE — 97530 THERAPEUTIC ACTIVITIES: CPT

## 2019-08-26 PROCEDURE — 97535 SELF CARE MNGMENT TRAINING: CPT

## 2019-08-26 PROCEDURE — 2580000003 HC RX 258: Performed by: FAMILY MEDICINE

## 2019-08-26 PROCEDURE — 6360000002 HC RX W HCPCS: Performed by: FAMILY MEDICINE

## 2019-08-26 PROCEDURE — 6370000000 HC RX 637 (ALT 250 FOR IP): Performed by: FAMILY MEDICINE

## 2019-08-26 PROCEDURE — 80048 BASIC METABOLIC PNL TOTAL CA: CPT

## 2019-08-26 PROCEDURE — 2700000000 HC OXYGEN THERAPY PER DAY

## 2019-08-26 PROCEDURE — 2580000003 HC RX 258: Performed by: ORTHOPAEDIC SURGERY

## 2019-08-26 PROCEDURE — 85025 COMPLETE CBC W/AUTO DIFF WBC: CPT

## 2019-08-26 PROCEDURE — 83735 ASSAY OF MAGNESIUM: CPT

## 2019-08-26 PROCEDURE — 6370000000 HC RX 637 (ALT 250 FOR IP): Performed by: ORTHOPAEDIC SURGERY

## 2019-08-26 PROCEDURE — 94640 AIRWAY INHALATION TREATMENT: CPT

## 2019-08-26 PROCEDURE — 6360000002 HC RX W HCPCS: Performed by: ORTHOPAEDIC SURGERY

## 2019-08-26 PROCEDURE — 36415 COLL VENOUS BLD VENIPUNCTURE: CPT

## 2019-08-26 RX ORDER — LEVOTHYROXINE SODIUM 0.1 MG/1
100 TABLET ORAL DAILY
Qty: 30 TABLET | Refills: 0 | Status: SHIPPED | OUTPATIENT
Start: 2019-08-26

## 2019-08-26 RX ORDER — LEVOFLOXACIN 250 MG/1
500 TABLET ORAL DAILY
Qty: 7 TABLET | Refills: 0 | Status: SHIPPED | OUTPATIENT
Start: 2019-08-26 | End: 2019-09-02

## 2019-08-26 RX ORDER — BENZONATATE 100 MG/1
100 CAPSULE ORAL EVERY 8 HOURS PRN
Qty: 21 CAPSULE | Refills: 0 | Status: SHIPPED | OUTPATIENT
Start: 2019-08-26 | End: 2019-09-02

## 2019-08-26 RX ORDER — GUAIFENESIN 600 MG/1
600 TABLET, EXTENDED RELEASE ORAL 2 TIMES DAILY
COMMUNITY
Start: 2019-08-26

## 2019-08-26 RX ORDER — OXYCODONE HYDROCHLORIDE 5 MG/1
5 TABLET ORAL EVERY 4 HOURS PRN
Qty: 18 TABLET | Refills: 0 | Status: SHIPPED | OUTPATIENT
Start: 2019-08-26 | End: 2019-08-29

## 2019-08-26 RX ORDER — POLYETHYLENE GLYCOL 3350 17 G/17G
17 POWDER, FOR SOLUTION ORAL DAILY PRN
Qty: 527 G | Refills: 1 | Status: SHIPPED | OUTPATIENT
Start: 2019-08-26 | End: 2019-09-25

## 2019-08-26 RX ORDER — PSEUDOEPHEDRINE HCL 30 MG
100 TABLET ORAL 2 TIMES DAILY
Refills: 0 | COMMUNITY
Start: 2019-08-26

## 2019-08-26 RX ORDER — PREGABALIN 75 MG/1
75 CAPSULE ORAL NIGHTLY
Qty: 30 CAPSULE | Refills: 0 | Status: SHIPPED | OUTPATIENT
Start: 2019-08-26 | End: 2019-09-25

## 2019-08-26 RX ORDER — PREDNISONE 10 MG/1
10 TABLET ORAL DAILY
Qty: 10 TABLET | Refills: 0 | Status: SHIPPED | OUTPATIENT
Start: 2019-08-26 | End: 2019-09-05

## 2019-08-26 RX ORDER — ALBUTEROL SULFATE 2.5 MG/3ML
2.5 SOLUTION RESPIRATORY (INHALATION) EVERY 6 HOURS PRN
Qty: 120 EACH | Refills: 3 | Status: SHIPPED | OUTPATIENT
Start: 2019-08-26

## 2019-08-26 RX ADMIN — PIPERACILLIN AND TAZOBACTAM 3.38 G: 3; .375 INJECTION, POWDER, LYOPHILIZED, FOR SOLUTION INTRAVENOUS at 09:16

## 2019-08-26 RX ADMIN — FUROSEMIDE 40 MG: 40 TABLET ORAL at 09:16

## 2019-08-26 RX ADMIN — MICONAZOLE NITRATE: 20 POWDER TOPICAL at 09:17

## 2019-08-26 RX ADMIN — ATORVASTATIN CALCIUM 10 MG: 10 TABLET, FILM COATED ORAL at 09:16

## 2019-08-26 RX ADMIN — POTASSIUM CHLORIDE 20 MEQ: 20 TABLET, EXTENDED RELEASE ORAL at 09:17

## 2019-08-26 RX ADMIN — GUAIFENESIN 600 MG: 600 TABLET, EXTENDED RELEASE ORAL at 09:16

## 2019-08-26 RX ADMIN — DOCUSATE SODIUM 100 MG: 100 CAPSULE, LIQUID FILLED ORAL at 09:16

## 2019-08-26 RX ADMIN — LEVOTHYROXINE SODIUM 100 MCG: 0.1 TABLET ORAL at 05:17

## 2019-08-26 RX ADMIN — PREDNISONE 10 MG: 5 TABLET ORAL at 09:16

## 2019-08-26 RX ADMIN — Medication 10 ML: at 09:21

## 2019-08-26 RX ADMIN — IPRATROPIUM BROMIDE 0.5 MG: 0.5 SOLUTION RESPIRATORY (INHALATION) at 11:19

## 2019-08-26 RX ADMIN — VITAMIN C 1 TABLET: TAB at 09:16

## 2019-08-26 RX ADMIN — ACETAMINOPHEN 650 MG: 325 TABLET ORAL at 05:19

## 2019-08-26 RX ADMIN — PANTOPRAZOLE SODIUM 40 MG: 40 TABLET, DELAYED RELEASE ORAL at 05:17

## 2019-08-26 ASSESSMENT — PAIN SCALES - GENERAL
PAINLEVEL_OUTOF10: 4
PAINLEVEL_OUTOF10: 4
PAINLEVEL_OUTOF10: 6
PAINLEVEL_OUTOF10: 6

## 2019-08-26 ASSESSMENT — PAIN DESCRIPTION - ORIENTATION
ORIENTATION: RIGHT

## 2019-08-26 ASSESSMENT — PAIN DESCRIPTION - ONSET: ONSET: ON-GOING

## 2019-08-26 ASSESSMENT — PAIN DESCRIPTION - LOCATION
LOCATION: HIP

## 2019-08-26 ASSESSMENT — PAIN DESCRIPTION - FREQUENCY: FREQUENCY: INTERMITTENT

## 2019-08-26 ASSESSMENT — PAIN DESCRIPTION - PAIN TYPE
TYPE: SURGICAL PAIN

## 2019-08-26 ASSESSMENT — PAIN - FUNCTIONAL ASSESSMENT: PAIN_FUNCTIONAL_ASSESSMENT: PREVENTS OR INTERFERES WITH MANY ACTIVE NOT PASSIVE ACTIVITIES

## 2019-08-26 ASSESSMENT — PAIN DESCRIPTION - DESCRIPTORS
DESCRIPTORS: ACHING
DESCRIPTORS: ACHING

## 2019-08-26 NOTE — PROGRESS NOTES
Patient finally stable and will be transferring to [facility] for rehab, patient is alert but confused at times but knows she is transferring. Patient incisions look good from IM nailing and are open to air. She is TTWB to RLE although she is not always able to maintain it. Report called to [facility] over to [facility] and scripts and discharge papers also sent. And hard copies will go with patient. IV d'cd to left arm and EMS called for transport. Awaiting EMS at this time.

## 2019-08-26 NOTE — PROGRESS NOTES
Pulmonary and Critical Care Progress Note 400 [facility]    Patient: Zane Jha  1935   MR# [id number]   Acct# [de-identified]  08/26/19   9:22 AM  Referring Provider: Alpesh Winters MD    Chief Complaint: Hemoptysis and hypoxemia. Interval history: The patient currently is resting comfortably with her food tray in front of her. She is unsure of which NH she is going to. She not had any further hemoptysis and her Hgb is stable. Medications:   potassium chloride, 20 mEq, Oral, TID WC    piperacillin-tazobactam, 3.375 g, Intravenous, Q8H    levofloxacin, 750 mg, Intravenous, Q48H    [START ON 8/27/2019] apixaban, 5 mg, Oral, BID    predniSONE, 10 mg, Oral, Daily    atorvastatin, 10 mg, Oral, Daily    vitamin B and C, 1 tablet, Oral, Daily    donepezil, 10 mg, Oral, Nightly    pantoprazole, 40 mg, Oral, QAM AC    furosemide, 40 mg, Oral, Daily    levothyroxine, 100 mcg, Oral, Daily    miconazole, , Topical, BID    pregabalin, 75 mg, Oral, Nightly    ipratropium, 0.5 mg, Nebulization, 4x daily    guaiFENesin, 600 mg, Oral, BID    sodium chloride flush, 10 mL, Intravenous, 2 times per day    docusate sodium, 100 mg, Oral, BID   Review of Systems: Review of Systems Review of Systems   Constitutional: Negative for chills and fever. HENT: Negative. Negative for congestion and sore throat. Eyes: Negative. Negative for discharge and itching. Respiratory: Positive for cough with hemoptysis and also positive for dyspnea. Cardiovascular: Negative. Negative for chest pain, palpitations and leg swelling. Gastrointestinal: Negative. Negative for diarrhea and vomiting. Musculoskeletal:        Right hip pain    Skin: Negative. Negative for rash and wound. Neurological:        Hx of dementia on aricept    Psychiatric/Behavioral: Negative.   Negative for agitation and behavioral problems.      Physical Exam:  Blood pressure 122/68, pulse 81, temperature 98.6 °F (37 °C), temperature

## 2019-09-06 ENCOUNTER — LAB REQUISITION (OUTPATIENT)
Dept: LAB | Facility: HOSPITAL | Age: 84
End: 2019-09-06

## 2019-09-06 DIAGNOSIS — Z00.00 ENCOUNTER FOR GENERAL ADULT MEDICAL EXAMINATION WITHOUT ABNORMAL FINDINGS: ICD-10-CM

## 2019-09-06 LAB
ANION GAP SERPL CALCULATED.3IONS-SCNC: 9 MMOL/L (ref 4–13)
BASOPHILS # BLD AUTO: 0.02 10*3/MM3 (ref 0–0.2)
BASOPHILS NFR BLD AUTO: 0.1 % (ref 0–1.5)
BUN BLD-MCNC: 18 MG/DL (ref 5–21)
BUN/CREAT SERPL: 24 (ref 7–25)
BURR CELLS BLD QL SMEAR: NORMAL
CALCIUM SPEC-SCNC: 8.3 MG/DL (ref 8.4–10.4)
CHLORIDE SERPL-SCNC: 103 MMOL/L (ref 98–110)
CLUMPED PLATELETS: PRESENT
CO2 SERPL-SCNC: 25 MMOL/L (ref 24–31)
CREAT BLD-MCNC: 0.75 MG/DL (ref 0.5–1.4)
DACRYOCYTES BLD QL SMEAR: NORMAL
DEPRECATED RDW RBC AUTO: 53.9 FL (ref 37–54)
ELLIPTOCYTES BLD QL SMEAR: NORMAL
EOSINOPHIL # BLD AUTO: 0.05 10*3/MM3 (ref 0–0.4)
EOSINOPHIL NFR BLD AUTO: 0.4 % (ref 0.3–6.2)
ERYTHROCYTE [DISTWIDTH] IN BLOOD BY AUTOMATED COUNT: 17.1 % (ref 12.3–15.4)
GFR SERPL CREATININE-BSD FRML MDRD: 74 ML/MIN/1.73
GLUCOSE BLD-MCNC: 82 MG/DL (ref 70–100)
HCT VFR BLD AUTO: 24 % (ref 34–46.6)
HGB BLD-MCNC: 7.7 G/DL (ref 12–15.9)
LYMPHOCYTES # BLD AUTO: 1.1 10*3/MM3 (ref 0.7–3.1)
LYMPHOCYTES NFR BLD AUTO: 7.7 % (ref 19.6–45.3)
MCH RBC QN AUTO: 28.2 PG (ref 26.6–33)
MCHC RBC AUTO-ENTMCNC: 32.1 G/DL (ref 31.5–35.7)
MCV RBC AUTO: 87.9 FL (ref 79–97)
MONOCYTES # BLD AUTO: 1.41 10*3/MM3 (ref 0.1–0.9)
MONOCYTES NFR BLD AUTO: 9.9 % (ref 5–12)
NEUTROPHILS # BLD AUTO: 11.47 10*3/MM3 (ref 1.7–7)
NEUTROPHILS NFR BLD AUTO: 80.4 % (ref 42.7–76)
PLATELET # BLD AUTO: 369 10*3/MM3 (ref 140–450)
PMV BLD AUTO: 10.4 FL (ref 6–12)
POTASSIUM BLD-SCNC: 4.4 MMOL/L (ref 3.5–5.3)
RBC # BLD AUTO: 2.73 10*6/MM3 (ref 3.77–5.28)
SODIUM BLD-SCNC: 137 MMOL/L (ref 135–145)
WBC NRBC COR # BLD: 14.27 10*3/MM3 (ref 3.4–10.8)

## 2019-09-06 PROCEDURE — 85007 BL SMEAR W/DIFF WBC COUNT: CPT

## 2019-09-06 PROCEDURE — 85025 COMPLETE CBC W/AUTO DIFF WBC: CPT

## 2019-09-06 PROCEDURE — 80048 BASIC METABOLIC PNL TOTAL CA: CPT

## 2019-09-06 PROCEDURE — 36415 COLL VENOUS BLD VENIPUNCTURE: CPT

## 2019-09-09 ENCOUNTER — LAB REQUISITION (OUTPATIENT)
Dept: LAB | Facility: HOSPITAL | Age: 84
End: 2019-09-09

## 2019-09-09 DIAGNOSIS — Z00.00 ENCOUNTER FOR GENERAL ADULT MEDICAL EXAMINATION WITHOUT ABNORMAL FINDINGS: ICD-10-CM

## 2019-09-09 LAB
BASOPHILS # BLD AUTO: 0.05 10*3/MM3 (ref 0–0.2)
BASOPHILS NFR BLD AUTO: 0.4 % (ref 0–1.5)
DEPRECATED RDW RBC AUTO: 54.6 FL (ref 37–54)
EOSINOPHIL # BLD AUTO: 0.05 10*3/MM3 (ref 0–0.4)
EOSINOPHIL NFR BLD AUTO: 0.4 % (ref 0.3–6.2)
ERYTHROCYTE [DISTWIDTH] IN BLOOD BY AUTOMATED COUNT: 17.1 % (ref 12.3–15.4)
HCT VFR BLD AUTO: 27.6 % (ref 34–46.6)
HGB BLD-MCNC: 8.5 G/DL (ref 12–15.9)
LYMPHOCYTES # BLD AUTO: 0.99 10*3/MM3 (ref 0.7–3.1)
LYMPHOCYTES NFR BLD AUTO: 7.1 % (ref 19.6–45.3)
MCH RBC QN AUTO: 27.2 PG (ref 26.6–33)
MCHC RBC AUTO-ENTMCNC: 30.8 G/DL (ref 31.5–35.7)
MCV RBC AUTO: 88.2 FL (ref 79–97)
MONOCYTES # BLD AUTO: 1.26 10*3/MM3 (ref 0.1–0.9)
MONOCYTES NFR BLD AUTO: 9 % (ref 5–12)
NEUTROPHILS # BLD AUTO: 11.41 10*3/MM3 (ref 1.7–7)
NEUTROPHILS NFR BLD AUTO: 81.3 % (ref 42.7–76)
PLATELET # BLD AUTO: 409 10*3/MM3 (ref 140–450)
PMV BLD AUTO: 10.8 FL (ref 6–12)
RBC # BLD AUTO: 3.13 10*6/MM3 (ref 3.77–5.28)
WBC NRBC COR # BLD: 14.01 10*3/MM3 (ref 3.4–10.8)

## 2019-09-09 PROCEDURE — 36415 COLL VENOUS BLD VENIPUNCTURE: CPT | Performed by: NURSE PRACTITIONER

## 2019-09-09 PROCEDURE — 85025 COMPLETE CBC W/AUTO DIFF WBC: CPT | Performed by: NURSE PRACTITIONER

## 2019-09-16 ENCOUNTER — LAB REQUISITION (OUTPATIENT)
Dept: LAB | Facility: HOSPITAL | Age: 84
End: 2019-09-16

## 2019-09-16 DIAGNOSIS — Z00.00 ENCOUNTER FOR GENERAL ADULT MEDICAL EXAMINATION WITHOUT ABNORMAL FINDINGS: ICD-10-CM

## 2019-09-16 LAB
BILIRUB UR QL STRIP: NEGATIVE
CLARITY UR: CLEAR
COLOR UR: YELLOW
GLUCOSE UR STRIP-MCNC: NEGATIVE MG/DL
HGB UR QL STRIP.AUTO: NEGATIVE
KETONES UR QL STRIP: NEGATIVE
LEUKOCYTE ESTERASE UR QL STRIP.AUTO: NEGATIVE
NITRITE UR QL STRIP: NEGATIVE
PH UR STRIP.AUTO: 6.5 [PH] (ref 5–8)
PROT UR QL STRIP: NEGATIVE
SP GR UR STRIP: 1.01 (ref 1–1.03)
UROBILINOGEN UR QL STRIP: NORMAL

## 2019-09-16 PROCEDURE — 81003 URINALYSIS AUTO W/O SCOPE: CPT | Performed by: NURSE PRACTITIONER

## 2019-09-16 PROCEDURE — 87086 URINE CULTURE/COLONY COUNT: CPT | Performed by: NURSE PRACTITIONER

## 2019-09-17 ENCOUNTER — LAB REQUISITION (OUTPATIENT)
Dept: LAB | Facility: HOSPITAL | Age: 84
End: 2019-09-17

## 2019-09-17 ENCOUNTER — OFFICE VISIT (OUTPATIENT)
Dept: CARDIOLOGY | Age: 84
End: 2019-09-17
Payer: MEDICARE

## 2019-09-17 VITALS
BODY MASS INDEX: 15.9 KG/M2 | WEIGHT: 81 LBS | OXYGEN SATURATION: 90 % | HEART RATE: 56 BPM | DIASTOLIC BLOOD PRESSURE: 62 MMHG | SYSTOLIC BLOOD PRESSURE: 102 MMHG | HEIGHT: 60 IN

## 2019-09-17 DIAGNOSIS — I25.10 CORONARY ARTERY DISEASE INVOLVING NATIVE CORONARY ARTERY OF NATIVE HEART WITHOUT ANGINA PECTORIS: Primary | ICD-10-CM

## 2019-09-17 DIAGNOSIS — Z00.00 ENCOUNTER FOR GENERAL ADULT MEDICAL EXAMINATION WITHOUT ABNORMAL FINDINGS: ICD-10-CM

## 2019-09-17 DIAGNOSIS — E78.5 HYPERLIPIDEMIA, UNSPECIFIED HYPERLIPIDEMIA TYPE: ICD-10-CM

## 2019-09-17 DIAGNOSIS — I10 ESSENTIAL HYPERTENSION: ICD-10-CM

## 2019-09-17 LAB
BACTERIA SPEC AEROBE CULT: NO GROWTH
BASOPHILS # BLD AUTO: 0.04 10*3/MM3 (ref 0–0.2)
BASOPHILS NFR BLD AUTO: 0.3 % (ref 0–1.5)
DEPRECATED RDW RBC AUTO: 54.3 FL (ref 37–54)
EOSINOPHIL # BLD AUTO: 0.05 10*3/MM3 (ref 0–0.4)
EOSINOPHIL NFR BLD AUTO: 0.4 % (ref 0.3–6.2)
ERYTHROCYTE [DISTWIDTH] IN BLOOD BY AUTOMATED COUNT: 17.1 % (ref 12.3–15.4)
HCT VFR BLD AUTO: 27.8 % (ref 34–46.6)
HGB BLD-MCNC: 8.6 G/DL (ref 12–15.9)
LYMPHOCYTES # BLD AUTO: 1.05 10*3/MM3 (ref 0.7–3.1)
LYMPHOCYTES NFR BLD AUTO: 7.8 % (ref 19.6–45.3)
MCH RBC QN AUTO: 27 PG (ref 26.6–33)
MCHC RBC AUTO-ENTMCNC: 30.9 G/DL (ref 31.5–35.7)
MCV RBC AUTO: 87.4 FL (ref 79–97)
MONOCYTES # BLD AUTO: 1.28 10*3/MM3 (ref 0.1–0.9)
MONOCYTES NFR BLD AUTO: 9.5 % (ref 5–12)
NEUTROPHILS # BLD AUTO: 10.69 10*3/MM3 (ref 1.7–7)
NEUTROPHILS NFR BLD AUTO: 79.8 % (ref 42.7–76)
PLATELET # BLD AUTO: 403 10*3/MM3 (ref 140–450)
PMV BLD AUTO: 10.3 FL (ref 6–12)
RBC # BLD AUTO: 3.18 10*6/MM3 (ref 3.77–5.28)
WBC NRBC COR # BLD: 13.41 10*3/MM3 (ref 3.4–10.8)

## 2019-09-17 PROCEDURE — 1090F PRES/ABSN URINE INCON ASSESS: CPT | Performed by: NURSE PRACTITIONER

## 2019-09-17 PROCEDURE — 99213 OFFICE O/P EST LOW 20 MIN: CPT | Performed by: NURSE PRACTITIONER

## 2019-09-17 PROCEDURE — 1111F DSCHRG MED/CURRENT MED MERGE: CPT | Performed by: NURSE PRACTITIONER

## 2019-09-17 PROCEDURE — G8419 CALC BMI OUT NRM PARAM NOF/U: HCPCS | Performed by: NURSE PRACTITIONER

## 2019-09-17 PROCEDURE — 36415 COLL VENOUS BLD VENIPUNCTURE: CPT | Performed by: NURSE PRACTITIONER

## 2019-09-17 PROCEDURE — 1036F TOBACCO NON-USER: CPT | Performed by: NURSE PRACTITIONER

## 2019-09-17 PROCEDURE — 85025 COMPLETE CBC W/AUTO DIFF WBC: CPT | Performed by: NURSE PRACTITIONER

## 2019-09-17 PROCEDURE — G8427 DOCREV CUR MEDS BY ELIG CLIN: HCPCS | Performed by: NURSE PRACTITIONER

## 2019-09-17 PROCEDURE — 4040F PNEUMOC VAC/ADMIN/RCVD: CPT | Performed by: NURSE PRACTITIONER

## 2019-09-17 PROCEDURE — 1123F ACP DISCUSS/DSCN MKR DOCD: CPT | Performed by: NURSE PRACTITIONER

## 2019-09-17 PROCEDURE — G8598 ASA/ANTIPLAT THER USED: HCPCS | Performed by: NURSE PRACTITIONER

## 2019-09-17 PROCEDURE — G8400 PT W/DXA NO RESULTS DOC: HCPCS | Performed by: NURSE PRACTITIONER

## 2019-09-17 RX ORDER — OXYCODONE HYDROCHLORIDE AND ACETAMINOPHEN 5; 325 MG/1; MG/1
1 TABLET ORAL EVERY 6 HOURS PRN
COMMUNITY

## 2019-09-17 NOTE — PROGRESS NOTES
[facility]  Established Patient Office Visit   Maria R Duque. [address] [place] [id number] [phone]        OFFICE VISIT:  2019    Jacob Feliz - : 1935    Reason For Visit:  Digna Henson is a 80 y.o. female who is here for Follow-up and Hypertension    1. Coronary artery disease involving native coronary artery of native heart without angina pectoris    2. Essential hypertension    3. Hyperlipidemia, unspecified hyperlipidemia type      Patient with a history of coronary artery disease, hypertension, mixed hyperlipidemia, mitral valve stenosis, and diastolic dysfunction. Patient presents to clinic today. Patient presents to clinic today for 3-month follow-up. Patient was last seen in the office on 2019. Patient had a hospitalization on 2019 for right hip fracture, status post right intra-medullary nailing by Dr. Emma Bustillos. Patient was transferred on discharge to [facility]. Patient is currently residing in [facility] she has been there 18 days. . She denies any complaints of chest pain, pressure or tightness. There is no shortness of breath beyond her baseline. She is on oxygen 2 L. Patient denies any lightheadedness, dizziness or syncope.         Cassidy Lane is a 80 y.o. 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 ([address] 2019    PVD (peripheral vascular disease) ([address] 2019    H/O cardiac murmur 2019    Medication management 2019    Status post insertion of Description if Yes       Fatigue No   Weight gain N/A   Insomnia N/A       Respiratory:        Complaint / Symptom Yes / No / Description if Yes       Cough No   Horseness N/A       Cardiovascular:    Complaint / Symptom Yes / No / Description if Yes       Chest Pain No   Shortness of Air / Orthopnea No   Presyncope / Syncope No   Palpitations No     All other systems reviewed and are negative    Objective:    /62   Pulse 56   Ht 5' (1.524 m)   Wt 81 lb (36.7 kg)   SpO2 90%   BMI 15.82 kg/m²     GENERAL -patient frail and decreased weight, conversant  HEENT -  PERRLA, Hearing appears normal, gentleman lids are normal.  External inspection of ears and nose appear normal.  NECK - no thyromegaly, no JVD, trachea is in the midline  CARDIOVASCULAR - PMI is in the mid line clavicular position, Normal S1 and S2 with a grade 1/6 systolic murmur. No S3 or S4    PULMONARY - no respiratory distress. No wheezes or rales. Lungs are clear to ausculation, normal respiratory effort. ABDOMEN  - soft, non tender, no rebound  MUSCULOSKELETAL  - range of motion of the upper and lower extermites appears normal and equal and is without pain   EXTREMITIES - no significant edema   NEUROLOGIC - gait and station are normal  SKIN - turgor is normal, can warm and dry. PSYCHIATRIC - normal mood and affect, alert and orientated x 3,      ASSESSMENT:    ALL THE CARDIOLOGY PROBLEMS ARE LISTED ABOVE; HOWEVER, THE FOLLOWING SPECIFIC CARDIAC PROBLEMS / CONDITIONS WERE ADDRESSED AND TREATED DURING THE OFFICE VISIT TODAY:                                                                                            MEDICAL DECISION MAKING             Cardiac Specific Problem / Diagnosis  Discussion and Data Reviewed Diagnostic Procedures Ordered Management Options Selected           1. CAD  Stable   Review and summation of old records:    No chest pain. H/O 2 KELY to LAD No Continue current medications:     Yes           2.

## 2019-09-21 ENCOUNTER — APPOINTMENT (OUTPATIENT)
Dept: GENERAL RADIOLOGY | Age: 84
End: 2019-09-21
Payer: MEDICARE

## 2019-09-21 ENCOUNTER — HOSPITAL ENCOUNTER (EMERGENCY)
Age: 84
Discharge: HOME | End: 2019-09-21
Attending: EMERGENCY MEDICINE
Payer: MEDICARE

## 2019-09-21 VITALS
OXYGEN SATURATION: 100 % | WEIGHT: 81 LBS | BODY MASS INDEX: 15.82 KG/M2 | DIASTOLIC BLOOD PRESSURE: 39 MMHG | HEART RATE: 74 BPM | SYSTOLIC BLOOD PRESSURE: 95 MMHG | TEMPERATURE: 97.7 F | RESPIRATION RATE: 18 BRPM

## 2019-09-21 DIAGNOSIS — R04.2 HEMOPTYSIS: Primary | ICD-10-CM

## 2019-09-21 DIAGNOSIS — C34.2 MALIGNANT NEOPLASM OF MIDDLE LOBE OF RIGHT LUNG (HCC): ICD-10-CM

## 2019-09-21 LAB
ALBUMIN SERPL-MCNC: 2.8 G/DL (ref 3.5–5.2)
ALP BLD-CCNC: 110 U/L (ref 35–104)
ALT SERPL-CCNC: 7 U/L (ref 5–33)
ANION GAP SERPL CALCULATED.3IONS-SCNC: 11 MMOL/L (ref 7–19)
APTT: 39.5 SEC (ref 26–36.2)
AST SERPL-CCNC: 15 U/L (ref 5–32)
BASOPHILS ABSOLUTE: 0 K/UL (ref 0–0.2)
BASOPHILS RELATIVE PERCENT: 0.2 % (ref 0–1)
BILIRUB SERPL-MCNC: 0.3 MG/DL (ref 0.2–1.2)
BUN BLDV-MCNC: 19 MG/DL (ref 8–23)
CALCIUM SERPL-MCNC: 8.3 MG/DL (ref 8.8–10.2)
CHLORIDE BLD-SCNC: 99 MMOL/L (ref 98–111)
CO2: 25 MMOL/L (ref 22–29)
CREAT SERPL-MCNC: 0.8 MG/DL (ref 0.5–0.9)
EOSINOPHILS ABSOLUTE: 0.1 K/UL (ref 0–0.6)
EOSINOPHILS RELATIVE PERCENT: 0.4 % (ref 0–5)
GFR NON-AFRICAN AMERICAN: >60
GLUCOSE BLD-MCNC: 100 MG/DL (ref 74–109)
HCT VFR BLD CALC: 27.7 % (ref 37–47)
HEMOGLOBIN: 8.1 G/DL (ref 12–16)
IMMATURE GRANULOCYTES #: 0.3 K/UL
INR BLD: 2.21 (ref 0.88–1.18)
LYMPHOCYTES ABSOLUTE: 0.9 K/UL (ref 1.1–4.5)
LYMPHOCYTES RELATIVE PERCENT: 6.2 % (ref 20–40)
MCH RBC QN AUTO: 26.9 PG (ref 27–31)
MCHC RBC AUTO-ENTMCNC: 29.2 G/DL (ref 33–37)
MCV RBC AUTO: 92 FL (ref 81–99)
MONOCYTES ABSOLUTE: 1.4 K/UL (ref 0–0.9)
MONOCYTES RELATIVE PERCENT: 10.2 % (ref 0–10)
NEUTROPHILS ABSOLUTE: 11.2 K/UL (ref 1.5–7.5)
NEUTROPHILS RELATIVE PERCENT: 80.8 % (ref 50–65)
PDW BLD-RTO: 17.2 % (ref 11.5–14.5)
PLATELET # BLD: 344 K/UL (ref 130–400)
PMV BLD AUTO: 9.9 FL (ref 9.4–12.3)
POTASSIUM SERPL-SCNC: 3.8 MMOL/L (ref 3.5–5)
PROTHROMBIN TIME: 23.8 SEC (ref 12–14.6)
RBC # BLD: 3.01 M/UL (ref 4.2–5.4)
SODIUM BLD-SCNC: 135 MMOL/L (ref 136–145)
TOTAL PROTEIN: 6.3 G/DL (ref 6.6–8.7)
WBC # BLD: 13.8 K/UL (ref 4.8–10.8)

## 2019-09-21 PROCEDURE — 85610 PROTHROMBIN TIME: CPT

## 2019-09-21 PROCEDURE — 99285 EMERGENCY DEPT VISIT HI MDM: CPT

## 2019-09-21 PROCEDURE — 85730 THROMBOPLASTIN TIME PARTIAL: CPT

## 2019-09-21 PROCEDURE — 71045 X-RAY EXAM CHEST 1 VIEW: CPT

## 2019-09-21 PROCEDURE — 85025 COMPLETE CBC W/AUTO DIFF WBC: CPT

## 2019-09-21 PROCEDURE — 36415 COLL VENOUS BLD VENIPUNCTURE: CPT

## 2019-09-21 PROCEDURE — 80053 COMPREHEN METABOLIC PANEL: CPT

## 2019-09-21 ASSESSMENT — ENCOUNTER SYMPTOMS: COUGH: 1

## 2019-09-21 NOTE — ED NOTES
[facility] and placed on will call per Dr Kate Tracy, RN  09/21/19 3957

## 2019-09-21 NOTE — ED PROVIDER NOTES
extremities with intermittent claudication     CAD (coronary artery disease) 2009    Carotid artery occlusion 2009    Cerebrovascular disease     Heart attack ([facility] 12/11/2015    History of blood transfusion     History of cardiac murmur     Hyperlipidemia 2009    Cholesterol management per Zafar Lira Hypertension 2009    Hypothyroidism     Multiple sclerosis ([facility]     Palliative care patient 08/19/2019    Pneumonia 05/2019    Seizure disorder ([facility]     Possible    Thyroid disease 2009    Tobacco abuse, continuous 2009         SURGICAL HISTORY       Past Surgical History:   Procedure Laterality Date    ABDOMINAL AORTIC ANEURYSM REPAIR  [facility]/12.14.09    5.2 CM AAA; 14MM INTERPOSITIONAL DACRON GRAFT    APPENDECTOMY      CARDIAC CATHETERIZATION  06/11/2002    EF60%     CARDIAC CATHETERIZATION  6/17/15  [address]    EF 70%    CARDIAC CATHETERIZATION  12/11/2015    EF 60%, x2 drug-eluting stents LAD.  CARDIAC VALVE SURGERY  [facility]/12.11.09    & R/O    CAROTID ENDARTERECTOMY Right 1/11/2016    CAROTID ENDARTERECTOMY WITH EEG  performed by Jesse Kirk MD at [address] [place] [address]      DIAGNOSTIC CARDIAC CATH LAB PROCEDURE  6/2/03    EF over 60% Normal LV function and hemodynamics, Mild nonocclusive CAD, w/o hemodynamically significant  lesions  identified     DIAGNOSTIC CARDIAC CATH LAB PROCEDURE  2/24/12    2 stents    ENDOSCOPY, COLON, DIAGNOSTIC      EYE SURGERY Bilateral     cataracts    FEMUR FRACTURE SURGERY Right 8/17/2019    FEMUR IM NAIL ELVER INSERTION performed by Yenifer Gan MD at [facility] [address] [place] Right     exc. lesion/bx.     FRACTURE SURGERY Right     elbow    OVARIAN CYST SURGERY      THYROID SURGERY      VASCULAR SURGERY  05- [facility]    Percutaneous Transluminal Arterial Angioplasty of left superficial femoral artery using a 6*2 cutting balloon reducing a 90% stenosis to 0% stenosis    VASCULAR SURGERY  12/11/2015 [facility]    Procedure: aborted induction for daily    PREGABALIN (LYRICA) 75 MG CAPSULE    Take 1 capsule by mouth nightly for 30 days. ALLERGIES     Morphine    FAMILY HISTORY       Family History   Problem Relation Age of Onset    Heart Attack Mother     Heart Attack Father     Coronary Art Dis Father     Other Other         AAA/2009    Other Sister         AAA/2009          SOCIAL HISTORY       Social History     Socioeconomic History    Marital status:       Spouse name: None    Number of children: 4    Years of education: None    Highest education level: None   Occupational History     Employer: RETIRED   Social Needs    Financial resource strain: None    Food insecurity:     Worry: None     Inability: None    Transportation needs:     Medical: None     Non-medical: None   Tobacco Use    Smoking status: Former Smoker     Packs/day: 2.00     Years: 50.00     Pack years: 100.00    Smokeless tobacco: Never Used   Substance and Sexual Activity    Alcohol use: No    Drug use: No    Sexual activity: None   Lifestyle    Physical activity:     Days per week: None     Minutes per session: None    Stress: None   Relationships    Social connections:     Talks on phone: None     Gets together: None     Attends Faith service: None     Active member of club or organization: None     Attends meetings of clubs or organizations: None     Relationship status: None    Intimate partner violence:     Fear of current or ex partner: None     Emotionally abused: None     Physically abused: None     Forced sexual activity: None   Other Topics Concern    None   Social History Narrative    None       SCREENINGS    Magan Coma Scale  Eye Opening: Spontaneous  Best Verbal Response: Confused  Best Motor Response: Obeys commands  Magan Coma Scale Score: 14        PHYSICAL EXAM    (up to 7 for level 4, 8 or more for level 5)     ED Triage Vitals [09/21/19 0216]   BP Temp Temp Source Pulse Resp SpO2 Height Weight   (!) 92/36 98.2 °F (36.8 °C) Oral 85 17 92 % -- 81 lb (36.7 kg)       Physical Exam   Constitutional: Vital signs are normal. She appears cachectic. She appears ill. No distress. HENT:   Head: Normocephalic and atraumatic. Mouth/Throat: Oropharynx is clear and moist.   Patient does appear to have dried blood around her lips. Eyes: Pupils are equal, round, and reactive to light. EOM are normal.   Conjunctive are pale   Neck: Normal range of motion. Neck supple. No JVD present. Cardiovascular: Normal rate, regular rhythm and normal heart sounds. No murmur heard. Pulmonary/Chest: Effort normal. No accessory muscle usage. No respiratory distress. She has decreased breath sounds in the right upper field. She has no wheezes. She has no rhonchi. She has no rales. Musculoskeletal: She exhibits no edema or tenderness. Neurological: She is alert. She is disoriented. No cranial nerve deficit or sensory deficit. Skin: Skin is warm and dry. Capillary refill takes 2 to 3 seconds. Psychiatric:   Unable to evaluate secondary to the dementia. Nursing note and vitals reviewed. DIAGNOSTIC RESULTS     EKG: All EKG's areinterpreted by the Emergency Department Physician who either signs or Co-signs this chart in the absence of a cardiologist.        RADIOLOGY:  Non-plain film images such as CT, Ultrasound and MRI are read by the radiologist. Plain radiographic images are visualized and preliminarily interpreted bythe emergency physician with the below findings:    XR CHEST PORTABLE   Final Result   Redemonstration of known masses throughout the right lung   with associated pleural thickening, likely primary neoplasm.    Signed by Dr Brisa Schwartz on 9/21/2019 7:00 AM              LABS:  Labs Reviewed   CBC WITH AUTO DIFFERENTIAL - Abnormal; Notable for the following components:       Result Value    WBC 13.8 (*)     RBC 3.01 (*)     Hemoglobin 8.1 (*)     Hematocrit 27.7 (*)     MCH 26.9 (*)     MCHC 29.2 (*)     RDW 17.2 (*)     Neutrophils

## 2019-09-25 ENCOUNTER — LAB REQUISITION (OUTPATIENT)
Dept: LAB | Facility: HOSPITAL | Age: 84
End: 2019-09-25

## 2019-09-25 DIAGNOSIS — Z00.00 ENCOUNTER FOR GENERAL ADULT MEDICAL EXAMINATION WITHOUT ABNORMAL FINDINGS: ICD-10-CM

## 2019-09-25 LAB
ALBUMIN SERPL-MCNC: 3.1 G/DL (ref 3.5–5.2)
ALBUMIN/GLOB SERPL: 0.9 G/DL
ALP SERPL-CCNC: 121 U/L (ref 39–117)
ALT SERPL W P-5'-P-CCNC: 9 U/L (ref 1–33)
ANION GAP SERPL CALCULATED.3IONS-SCNC: 11 MMOL/L (ref 5–15)
AST SERPL-CCNC: 18 U/L (ref 1–32)
BILIRUB SERPL-MCNC: 0.3 MG/DL (ref 0.2–1.2)
BUN BLD-MCNC: 14 MG/DL (ref 8–23)
BUN/CREAT SERPL: 20.3 (ref 7–25)
CALCIUM SPEC-SCNC: 8.4 MG/DL (ref 8.6–10.5)
CHLORIDE SERPL-SCNC: 97 MMOL/L (ref 98–107)
CO2 SERPL-SCNC: 29 MMOL/L (ref 22–29)
CREAT BLD-MCNC: 0.69 MG/DL (ref 0.57–1)
DEPRECATED RDW RBC AUTO: 55.6 FL (ref 37–54)
ERYTHROCYTE [DISTWIDTH] IN BLOOD BY AUTOMATED COUNT: 17.5 % (ref 12.3–15.4)
GFR SERPL CREATININE-BSD FRML MDRD: 81 ML/MIN/1.73
GIANT PLATELETS: ABNORMAL
GLOBULIN UR ELPH-MCNC: 3.5 GM/DL
GLUCOSE BLD-MCNC: 131 MG/DL (ref 65–99)
HCT VFR BLD AUTO: 28.7 % (ref 34–46.6)
HGB BLD-MCNC: 8.8 G/DL (ref 12–15.9)
LYMPHOCYTES # BLD MANUAL: 0.22 10*3/MM3 (ref 0.7–3.1)
LYMPHOCYTES NFR BLD MANUAL: 2 % (ref 19.6–45.3)
LYMPHOCYTES NFR BLD MANUAL: 9.2 % (ref 5–12)
MCH RBC QN AUTO: 27.1 PG (ref 26.6–33)
MCHC RBC AUTO-ENTMCNC: 30.7 G/DL (ref 31.5–35.7)
MCV RBC AUTO: 88.3 FL (ref 79–97)
MONOCYTES # BLD AUTO: 1.02 10*3/MM3 (ref 0.1–0.9)
NEUTROPHILS # BLD AUTO: 9.87 10*3/MM3 (ref 1.7–7)
NEUTROPHILS NFR BLD MANUAL: 88.8 % (ref 42.7–76)
PLATELET # BLD AUTO: 338 10*3/MM3 (ref 140–450)
PMV BLD AUTO: 10.7 FL (ref 6–12)
POLYCHROMASIA BLD QL SMEAR: ABNORMAL
POTASSIUM BLD-SCNC: 3.9 MMOL/L (ref 3.5–5.2)
PROT SERPL-MCNC: 6.6 G/DL (ref 6–8.5)
RBC # BLD AUTO: 3.25 10*6/MM3 (ref 3.77–5.28)
SODIUM BLD-SCNC: 137 MMOL/L (ref 136–145)
WBC MORPH BLD: NORMAL
WBC NRBC COR # BLD: 11.11 10*3/MM3 (ref 3.4–10.8)

## 2019-09-25 PROCEDURE — 85025 COMPLETE CBC W/AUTO DIFF WBC: CPT | Performed by: INTERNAL MEDICINE

## 2019-09-25 PROCEDURE — 80053 COMPREHEN METABOLIC PANEL: CPT | Performed by: INTERNAL MEDICINE

## 2019-09-28 ENCOUNTER — LAB REQUISITION (OUTPATIENT)
Dept: LAB | Facility: HOSPITAL | Age: 84
End: 2019-09-28

## 2019-09-28 DIAGNOSIS — Z00.00 ENCOUNTER FOR GENERAL ADULT MEDICAL EXAMINATION WITHOUT ABNORMAL FINDINGS: ICD-10-CM

## 2019-09-28 LAB
BACTERIA UR QL AUTO: ABNORMAL /HPF
BILIRUB UR QL STRIP: ABNORMAL
CLARITY UR: ABNORMAL
COLOR UR: ABNORMAL
GLUCOSE UR STRIP-MCNC: NEGATIVE MG/DL
HGB UR QL STRIP.AUTO: NEGATIVE
HYALINE CASTS UR QL AUTO: ABNORMAL /LPF
KETONES UR QL STRIP: ABNORMAL
LEUKOCYTE ESTERASE UR QL STRIP.AUTO: ABNORMAL
NITRITE UR QL STRIP: NEGATIVE
PH UR STRIP.AUTO: 6 [PH] (ref 5–8)
PROT UR QL STRIP: ABNORMAL
RBC # UR: ABNORMAL /HPF
REF LAB TEST METHOD: ABNORMAL
SP GR UR STRIP: 1.02 (ref 1–1.03)
SQUAMOUS #/AREA URNS HPF: ABNORMAL /HPF
UROBILINOGEN UR QL STRIP: ABNORMAL
WBC UR QL AUTO: ABNORMAL /HPF
YEAST URNS QL MICRO: ABNORMAL /HPF

## 2019-09-28 PROCEDURE — 81001 URINALYSIS AUTO W/SCOPE: CPT | Performed by: INTERNAL MEDICINE

## 2019-09-28 PROCEDURE — 87086 URINE CULTURE/COLONY COUNT: CPT | Performed by: INTERNAL MEDICINE

## 2019-09-29 LAB — BACTERIA SPEC AEROBE CULT: NORMAL

## 2019-10-11 ENCOUNTER — LAB REQUISITION (OUTPATIENT)
Dept: LAB | Facility: HOSPITAL | Age: 84
End: 2019-10-11

## 2019-10-11 DIAGNOSIS — Z00.00 ENCOUNTER FOR GENERAL ADULT MEDICAL EXAMINATION WITHOUT ABNORMAL FINDINGS: ICD-10-CM

## 2019-10-11 LAB
ALBUMIN SERPL-MCNC: 2.6 G/DL (ref 3.5–5.2)
ALBUMIN/GLOB SERPL: 0.8 G/DL
ALP SERPL-CCNC: 110 U/L (ref 39–117)
ALT SERPL W P-5'-P-CCNC: 5 U/L (ref 1–33)
ANION GAP SERPL CALCULATED.3IONS-SCNC: 13 MMOL/L (ref 5–15)
AST SERPL-CCNC: 14 U/L (ref 1–32)
BASOPHILS # BLD AUTO: 0.04 10*3/MM3 (ref 0–0.2)
BASOPHILS NFR BLD AUTO: 0.2 % (ref 0–1.5)
BILIRUB SERPL-MCNC: 0.3 MG/DL (ref 0.2–1.2)
BUN BLD-MCNC: 25 MG/DL (ref 8–23)
BUN/CREAT SERPL: 30.5 (ref 7–25)
CALCIUM SPEC-SCNC: 8.2 MG/DL (ref 8.6–10.5)
CHLORIDE SERPL-SCNC: 97 MMOL/L (ref 98–107)
CO2 SERPL-SCNC: 26 MMOL/L (ref 22–29)
CREAT BLD-MCNC: 0.82 MG/DL (ref 0.57–1)
DEPRECATED RDW RBC AUTO: 58.4 FL (ref 37–54)
EOSINOPHIL # BLD AUTO: 0.02 10*3/MM3 (ref 0–0.4)
EOSINOPHIL NFR BLD AUTO: 0.1 % (ref 0.3–6.2)
ERYTHROCYTE [DISTWIDTH] IN BLOOD BY AUTOMATED COUNT: 18 % (ref 12.3–15.4)
ERYTHROCYTE [SEDIMENTATION RATE] IN BLOOD: 118 MM/HR (ref 0–20)
GFR SERPL CREATININE-BSD FRML MDRD: 66 ML/MIN/1.73
GLOBULIN UR ELPH-MCNC: 3.3 GM/DL
GLUCOSE BLD-MCNC: 82 MG/DL (ref 65–99)
HCT VFR BLD AUTO: 27.7 % (ref 34–46.6)
HGB BLD-MCNC: 8.4 G/DL (ref 12–15.9)
LYMPHOCYTES # BLD AUTO: 1.22 10*3/MM3 (ref 0.7–3.1)
LYMPHOCYTES NFR BLD AUTO: 7.5 % (ref 19.6–45.3)
MAGNESIUM SERPL-MCNC: 2.1 MG/DL (ref 1.6–2.4)
MCH RBC QN AUTO: 26.8 PG (ref 26.6–33)
MCHC RBC AUTO-ENTMCNC: 30.3 G/DL (ref 31.5–35.7)
MCV RBC AUTO: 88.2 FL (ref 79–97)
MONOCYTES # BLD AUTO: 0.68 10*3/MM3 (ref 0.1–0.9)
MONOCYTES NFR BLD AUTO: 4.2 % (ref 5–12)
NEUTROPHILS # BLD AUTO: 13.91 10*3/MM3 (ref 1.7–7)
NEUTROPHILS NFR BLD AUTO: 86 % (ref 42.7–76)
PLATELET # BLD AUTO: 354 10*3/MM3 (ref 140–450)
PMV BLD AUTO: 10.4 FL (ref 6–12)
POTASSIUM BLD-SCNC: 3.6 MMOL/L (ref 3.5–5.2)
PROT SERPL-MCNC: 5.9 G/DL (ref 6–8.5)
RBC # BLD AUTO: 3.14 10*6/MM3 (ref 3.77–5.28)
SODIUM BLD-SCNC: 136 MMOL/L (ref 136–145)
TSH SERPL DL<=0.05 MIU/L-ACNC: 64.5 UIU/ML (ref 0.27–4.2)
WBC NRBC COR # BLD: 16.19 10*3/MM3 (ref 3.4–10.8)

## 2019-10-11 PROCEDURE — 84443 ASSAY THYROID STIM HORMONE: CPT | Performed by: NURSE PRACTITIONER

## 2019-10-11 PROCEDURE — 36415 COLL VENOUS BLD VENIPUNCTURE: CPT | Performed by: NURSE PRACTITIONER

## 2019-10-11 PROCEDURE — 80053 COMPREHEN METABOLIC PANEL: CPT | Performed by: NURSE PRACTITIONER

## 2019-10-11 PROCEDURE — 83735 ASSAY OF MAGNESIUM: CPT | Performed by: NURSE PRACTITIONER

## 2019-10-11 PROCEDURE — 85651 RBC SED RATE NONAUTOMATED: CPT | Performed by: NURSE PRACTITIONER

## 2019-10-11 PROCEDURE — 85025 COMPLETE CBC W/AUTO DIFF WBC: CPT | Performed by: NURSE PRACTITIONER

## (undated) DEVICE — WRAP AROUND LENS-STERLIE

## (undated) DEVICE — BIT DRL L L266MM DIA16MM FEM FLX CANN QUIK CPL

## (undated) DEVICE — ROD RMR L950MM DIA2.5MM W/ EXTN BALL TIP

## (undated) DEVICE — GLOVE SURG SZ 9 L12IN FNGR THK13MIL BRN LTX SYN POLYMER W

## (undated) DEVICE — SOLUTION IV IRRIG POUR BRL 0.9% SODIUM CHL 2F7124

## (undated) DEVICE — GLOVE SURG SZ 85 L12IN FNGR THK94MIL TRNSLUC YEL LTX

## (undated) DEVICE — GUIDEWIRE ORTH L400MM DIA3.2MM FOR TFN

## (undated) DEVICE — SURGICAL PROCEDURE PACK LOWER EXTREMITY LOURDES HOSP

## (undated) DEVICE — BIT DRL L330MM DIA4.2MM CALIB 100MM 3 FLUT QUIK CPL

## (undated) DEVICE — SUTURE VCRL SZ 3-0 L27IN ABSRB UD L26MM SH 1/2 CIR J416H

## (undated) DEVICE — C-ARM: Brand: UNBRANDED

## (undated) DEVICE — C-ARMOR C-ARM EQUIPMENT COVERS CLEAR STERILE UNIVERSAL FIT 12 PER CASE: Brand: C-ARMOR

## (undated) DEVICE — DRAPE,U/ SHT,SPLIT,PLAS,STERIL: Brand: MEDLINE

## (undated) DEVICE — 6617 IOBAN II PATIENT ISOLATION DRAPE 5/BX,4BX/CS: Brand: STERI-DRAPE™ IOBAN™ 2

## (undated) DEVICE — SUTURE VCRL SZ 2-0 L36IN ABSRB UD L36MM CT-1 1/2 CIR J945H

## (undated) DEVICE — Z DISCONTINUED USE 2429233 DRESSING FOAM W10XL10CM 5 LAYR SELF ADH VERSATILE SAFETAC

## (undated) DEVICE — SHEET,DRAPE,53X77,STERILE: Brand: MEDLINE